# Patient Record
Sex: FEMALE
[De-identification: names, ages, dates, MRNs, and addresses within clinical notes are randomized per-mention and may not be internally consistent; named-entity substitution may affect disease eponyms.]

---

## 2017-07-19 ENCOUNTER — HOSPITAL ENCOUNTER (OUTPATIENT)
Dept: HOSPITAL 5 - SPVIMAG | Age: 82
Discharge: HOME | End: 2017-07-19
Attending: INTERNAL MEDICINE
Payer: MEDICARE

## 2017-07-19 DIAGNOSIS — J84.9: ICD-10-CM

## 2017-07-19 DIAGNOSIS — J44.9: ICD-10-CM

## 2017-07-19 DIAGNOSIS — R04.2: ICD-10-CM

## 2017-07-19 DIAGNOSIS — J18.9: ICD-10-CM

## 2017-07-19 DIAGNOSIS — I25.10: ICD-10-CM

## 2017-07-19 DIAGNOSIS — R91.1: ICD-10-CM

## 2017-07-19 DIAGNOSIS — K80.20: Primary | ICD-10-CM

## 2017-07-19 DIAGNOSIS — I70.1: ICD-10-CM

## 2017-07-19 DIAGNOSIS — R06.02: ICD-10-CM

## 2017-07-19 DIAGNOSIS — I11.0: ICD-10-CM

## 2017-07-19 DIAGNOSIS — I50.9: ICD-10-CM

## 2017-07-19 DIAGNOSIS — Z95.1: ICD-10-CM

## 2017-07-19 PROCEDURE — 71260 CT THORAX DX C+: CPT

## 2017-07-19 PROCEDURE — 82962 GLUCOSE BLOOD TEST: CPT

## 2017-07-20 NOTE — CAT SCAN REPORT
CT CHEST WITH CONTRAST: 07/19/17 10:46:00





CLINICAL: Hemoptysis and shortness of breath.



Comparison: 06/03/14



TECHNIQUE: Volumetric acquisition and 1.25 mm scan reconstructions 

after the uneventful intravenous injection of 100cc Omnipaque 300.  

Consent was obtained prior to the administration of contrast.



FINDINGS: Mild bilateral multilobar intralobular septal thickening is 

slightly less prominent than on the prior exam.  Subtle multilobar 

groundglass opacities as seen on the last exam.  A left upper lobe oval 

circumscribed groundglass opacity at measures 1.0 cm x 0.7 cm compared 

to 0.7 x 0.4 cm on the last exam.  A more dense 5 mm nodular opacity of 

the right upper lobe is stable.  A wedge-shaped medial left upper lobe 

scar extending to the pleura in the apex is stable.  Stable bronchial 

dilatation in the center of the scar.  A number of multilobar 

subcentimeter nodular opacities have resolved compared to the last 

exam.  Decreased bilateral pleural thickening.  No pleural effusion.  

Normal heart status post CABG.  Calcification of the aorta.  The 

pulmonary arteries are normal.  No hilar or mediastinal 

lymphadenopathy. No pleural effusion. Normal thyroid, trachea and 

esophagus. No axillary or supraclavicular lymphadenopathy. The upper 

abdomen is remarkable for a 5 mm gallstone which is new.  In addition, 

the right kidney is smaller than the left and this is a new finding 

compared to the last exam.  The right renal artery is heavily calcified 

in the abdominal aorta is heavily calcified.  Bone windows demonstrate 

osteopenia and degenerative changes in the spine.  No suspicious bone 

lesions.  Status post CABG with median sternotomy wires.  Patent left 

subclavian arterial graft extending to the pelvis.



IMPRESSION: 1.  Chronic interstitial disease with slight overall 

improvement compared to the last exam.  2.  A 1 cm left upper lobe 

sub-solid lung nodule.  Recommend six month followup with a noncontrast 

CT to evaluate for growth.  3.  Cholelithiasis.  4.  Right renal artery 

stenosis and decreased size of the right kidney since 2014.

## 2018-04-10 ENCOUNTER — HOSPITAL ENCOUNTER (EMERGENCY)
Dept: HOSPITAL 5 - ED | Age: 83
Discharge: HOME | End: 2018-04-10
Payer: MEDICARE

## 2018-04-10 VITALS — DIASTOLIC BLOOD PRESSURE: 76 MMHG | SYSTOLIC BLOOD PRESSURE: 180 MMHG

## 2018-04-10 DIAGNOSIS — Z88.8: ICD-10-CM

## 2018-04-10 DIAGNOSIS — E03.9: ICD-10-CM

## 2018-04-10 DIAGNOSIS — Z79.82: ICD-10-CM

## 2018-04-10 DIAGNOSIS — J44.9: ICD-10-CM

## 2018-04-10 DIAGNOSIS — F17.200: ICD-10-CM

## 2018-04-10 DIAGNOSIS — M54.9: ICD-10-CM

## 2018-04-10 DIAGNOSIS — Z88.6: ICD-10-CM

## 2018-04-10 DIAGNOSIS — I11.0: Primary | ICD-10-CM

## 2018-04-10 DIAGNOSIS — Z79.4: ICD-10-CM

## 2018-04-10 DIAGNOSIS — I50.9: ICD-10-CM

## 2018-04-10 DIAGNOSIS — E11.9: ICD-10-CM

## 2018-04-10 LAB
ALBUMIN SERPL-MCNC: 3.6 G/DL (ref 3.9–5)
ALT SERPL-CCNC: 10 UNITS/L (ref 7–56)
BASOPHILS # (AUTO): 0 K/MM3 (ref 0–0.1)
BASOPHILS NFR BLD AUTO: 0.6 % (ref 0–1.8)
BUN SERPL-MCNC: 22 MG/DL (ref 7–17)
BUN/CREAT SERPL: 18 %
CALCIUM SERPL-MCNC: 9.1 MG/DL (ref 8.4–10.2)
EOSINOPHIL # BLD AUTO: 0 K/MM3 (ref 0–0.4)
EOSINOPHIL NFR BLD AUTO: 0.2 % (ref 0–4.3)
HCT VFR BLD CALC: 31.7 % (ref 30.3–42.9)
HEMOLYSIS INDEX: 4
HGB BLD-MCNC: 10 GM/DL (ref 10.1–14.3)
LYMPHOCYTES # BLD AUTO: 2.4 K/MM3 (ref 1.2–5.4)
LYMPHOCYTES NFR BLD AUTO: 35.9 % (ref 13.4–35)
MCH RBC QN AUTO: 28 PG (ref 28–32)
MCHC RBC AUTO-ENTMCNC: 32 % (ref 30–34)
MCV RBC AUTO: 88 FL (ref 79–97)
MONOCYTES # (AUTO): 0.6 K/MM3 (ref 0–0.8)
MONOCYTES % (AUTO): 8.7 % (ref 0–7.3)
PLATELET # BLD: 161 K/MM3 (ref 140–440)
RBC # BLD AUTO: 3.59 M/MM3 (ref 3.65–5.03)

## 2018-04-10 PROCEDURE — 80053 COMPREHEN METABOLIC PANEL: CPT

## 2018-04-10 PROCEDURE — 99283 EMERGENCY DEPT VISIT LOW MDM: CPT

## 2018-04-10 PROCEDURE — 36415 COLL VENOUS BLD VENIPUNCTURE: CPT

## 2018-04-10 PROCEDURE — 85025 COMPLETE CBC W/AUTO DIFF WBC: CPT

## 2018-04-10 NOTE — EMERGENCY DEPARTMENT REPORT
ED General Adult HPI





- General


Chief complaint: High BP


Stated complaint: HYPERTENSIVE


Time Seen by Provider: 04/10/18 13:30


Source: patient


Mode of arrival: Ambulatory


Limitations: No Limitations





- History of Present Illness


Initial comments: 





Patient is a 22 years old female with past medical history of hypertension and 

spinal stenosis.  The patient was sent from orthopedics  office after she 

was found to have a high blood pressure.  Patient went orthopedics for steroids 

injection.  Patient stated that she was in pain management back pain.  Patient 

denied any weakness numbness or tingling sensation.  Patient denied any headache

, neck pain, bowel or bladder incontinence.  No fever.





- Related Data


 Home Medications











 Medication  Instructions  Recorded  Confirmed  Last Taken


 


Aspirin [Aspirin BABY CHEW TAB] 81 mg PO BID 14 09:00





    81


 


Calcium Carbonate/Vitamin D3 1 mg PO BID 14 09:00





[Calcium 600-Vit D3 400 Tablet]    1


 


Carvedilol [Coreg] 12.5 mg PO BID 14





    12.5


 


Cetirizine HCl 10 mg PO QDAY 14





    10


 


Ezetimibe [Zetia] 10 mg PO QDAY 14 09:00


 


Formoterol Fumarate [Foradil] 12 mg PO BID 14 09:00


 


Levothyroxine [Synthroid] 0.05 mg PO QAM 14 06:00


 


Multivitamin [Multi-Vitamin Daily] 1 mg PO QDAY 14 09:00





    1


 


Ramipril 10 mg PO QDAY 14





    40


 


Ranitidine HCl [Zantac 75 MG TAB] 150 mg PO BID 14 Unknown


 


Rosuvastatin Calcium [Crestor] 40 mg PO QHS 14





    40


 


Insulin Lispro [HumaLOG VIAL] 10 units SUB-Q TIDAC 14 Unknown








 Previous Rx's











 Medication  Instructions  Recorded  Last Taken  Type


 


Ipratropium/Albuterol Sulfate 1 ampul IH TIDRT 30 Days  ampul.neb 14 

Unknown Rx





[DUONEB *Not for PRN Use*]    


 


Levofloxacin [Levaquin TAB] 750 mg PO QDAY #5 tablet 14 Unknown Rx


 


Saccharomyces Boulardii (Nf) 250 mg PO Q12HR #20 capsule 14 Unknown Rx





[Florastor]    


 


guaiFENesin ER [Mucinex ER] 600 mg PO BID #14 tablet 14 Unknown Rx











 Allergies











Allergy/AdvReac Type Severity Reaction Status Date / Time


 


hydromorphone HCl Allergy Severe Rash Verified 14 05:48





[From Dilaudid]     


 


codeine AdvReac  Swelling Verified 14 13:32


 


hydroxyprogesterone caproate AdvReac  Seizure Verified 14 05:48





[From Delalutin]     














ED Review of Systems


ROS: 


Stated complaint: HYPERTENSIVE


Other details as noted in HPI





Comment: All other systems reviewed and negative


Constitutional: denies: chills, fever


Respiratory: denies: cough, orthopnea, shortness of breath, SOB with exertion


Cardiovascular: denies: chest pain, palpitations, dyspnea on exertion


Gastrointestinal: denies: abdominal pain, nausea, vomiting, diarrhea, 

constipation, hematemesis


Neurological: denies: headache, weakness, numbness, paresthesias





ED Past Medical Hx





- Past Medical History


Hx Hypertension: Yes


Hx Congestive Heart Failure: Yes


Hx Diabetes: Yes


Hx COPD: Yes


Additional medical history: hypothyroidism





- Surgical History


Hx Coronary Stent: Yes


Hx Appendectomy: Yes


Additional Surgical History: insulin pump, 





- Social History


Smoking Status: Current Every Day Smoker


Substance Use Type: None





- Medications


Home Medications: 


 Home Medications











 Medication  Instructions  Recorded  Confirmed  Last Taken  Type


 


Aspirin [Aspirin BABY CHEW TAB] 81 mg PO BID 14 09:00 

History





    81 


 


Calcium Carbonate/Vitamin D3 1 mg PO BID 14 09:00 

History





[Calcium 600-Vit D3 400 Tablet]    1 


 


Carvedilol [Coreg] 12.5 mg PO BID 14 History





    12.5 


 


Cetirizine HCl 10 mg PO QDAY 14 History





    10 


 


Ezetimibe [Zetia] 10 mg PO QDAY 14 09:00 History


 


Formoterol Fumarate [Foradil] 12 mg PO BID 14 09:00 

History


 


Levothyroxine [Synthroid] 0.05 mg PO QAM 14 06:00 

History


 


Multivitamin [Multi-Vitamin Daily] 1 mg PO QDAY 14 09:

00 History





    1 


 


Ramipril 10 mg PO QDAY 14 History





    40 


 


Ranitidine HCl [Zantac 75 MG TAB] 150 mg PO BID 14 Unknown 

History


 


Rosuvastatin Calcium [Crestor] 40 mg PO QHS 14 History





    40 


 


Insulin Lispro [HumaLOG VIAL] 10 units SUB-Q TIDAC 14 Unknown 

History


 


Ipratropium/Albuterol Sulfate 1 ampul IH TIDRT 30 Days  ampul.neb 14  

Unknown Rx





[DUONEB *Not for PRN Use*]     


 


Levofloxacin [Levaquin TAB] 750 mg PO QDAY #5 tablet 14  Unknown Rx


 


Saccharomyces Boulardii (Nf) 250 mg PO Q12HR #20 capsule 14  Unknown Rx





[Florastor]     


 


guaiFENesin ER [Mucinex ER] 600 mg PO BID #14 tablet 14  Unknown Rx














ED Physical Exam





- General


Limitations: No Limitations


General appearance: alert, in no apparent distress





- Head


Head exam: Present: atraumatic, normocephalic, normal inspection





- Eye


Eye exam: Present: normal appearance, PERRL





- ENT


ENT exam: Present: normal exam, normal orophraynx, mucous membranes moist





- Neck


Neck exam: Present: normal inspection, full ROM.  Absent: tenderness, 

meningismus, lymphadenopathy





- Respiratory


Respiratory exam: Present: normal lung sounds bilaterally.  Absent: respiratory 

distress, wheezes, rales, rhonchi, chest wall tenderness





- Cardiovascular


Cardiovascular Exam: Present: regular rate, normal rhythm, normal heart sounds





- GI/Abdominal


GI/Abdominal exam: Present: soft.  Absent: distended, tenderness, guarding, 

rebound, rigid, normal bowel sounds





- Extremities Exam


Extremities exam: Present: normal inspection, full ROM, normal capillary refill





- Back Exam


Back exam: Present: normal inspection, full ROM.  Absent: CVA tenderness (L)





- Neurological Exam


Neurological exam: Present: alert, oriented X3, CN II-XII intact, normal gait





- Skin


Skin exam: Present: warm, intact, normal color





ED Course


 Vital Signs











  04/10/18 04/10/18 04/10/18





  11:52 12:03 14:06


 


Temperature 97.9 F  


 


Pulse Rate 68 68 62


 


Respiratory 18  11 L





Rate   


 


Blood Pressure 230/78 230/78 


 


Blood Pressure   





[Right]   


 


O2 Sat by Pulse 98  





Oximetry   














  04/10/18 04/10/18 04/10/18





  14:12 14:15 14:30


 


Temperature   


 


Pulse Rate  57 L 55 L


 


Respiratory 10 L 15 12





Rate   


 


Blood Pressure  188/72 163/65


 


Blood Pressure   





[Right]   


 


O2 Sat by Pulse 98 95 93





Oximetry   














  04/10/18 04/10/18 04/10/18





  14:45 15:00 15:15


 


Temperature   


 


Pulse Rate 61 53 L 55 L


 


Respiratory 14 12 12





Rate   


 


Blood Pressure 186/64 161/59 161/59


 


Blood Pressure   





[Right]   


 


O2 Sat by Pulse 92 94 93





Oximetry   














  04/10/18 04/10/18 04/10/18





  15:31 15:44 16:16


 


Temperature   


 


Pulse Rate 51 L  51 L


 


Respiratory 12  16





Rate   


 


Blood Pressure 164/57  


 


Blood Pressure  164/57 179/64





[Right]   


 


O2 Sat by Pulse 93  97





Oximetry   














  04/10/18





  18:16


 


Temperature 


 


Pulse Rate 74


 


Respiratory 16





Rate 


 


Blood Pressure 


 


Blood Pressure 180/76





[Right] 


 


O2 Sat by Pulse 99





Oximetry 














- Reevaluation(s)


Reevaluation #1: 





04/10/18 18:18


Patient refused pain medication stated that she does not have any.  This 

moment.  Her blood pressure is now 164/49.  Patient had wide pulse pressure, 

patient is completely asymptomatic.  No evidence of aortic regurgitation 

clinically.  I advised patient to follow-up with her primary care physician for 

further management.





ED Medical Decision Making





- Lab Data


Result diagrams: 


 04/10/18 13:52





 04/10/18 13:52


Critical care attestation.: 


If time is entered above; I have spent that time in minutes in the direct care 

of this critically ill patient, excluding procedure time.








ED Disposition


Clinical Impression: 


 Malignant hypertension, Back pain





Disposition: DC-01 TO HOME OR SELFCARE


Is pt being admited?: No


Condition: Stable


Instructions:  Lumbar Spinal Stenosis (ED), Hypertension (ED)


Referrals: 


PRIMARY CARE,MD [Primary Care Provider] - 3-5 Days

## 2018-05-19 ENCOUNTER — HOSPITAL ENCOUNTER (EMERGENCY)
Dept: HOSPITAL 5 - ED | Age: 83
Discharge: HOME | End: 2018-05-19
Payer: MEDICARE

## 2018-05-19 VITALS — DIASTOLIC BLOOD PRESSURE: 57 MMHG | SYSTOLIC BLOOD PRESSURE: 159 MMHG

## 2018-05-19 DIAGNOSIS — I50.9: ICD-10-CM
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
## 2018-05-19 DIAGNOSIS — Z79.4: ICD-10-CM

## 2018-05-19 DIAGNOSIS — E11.9: ICD-10-CM

## 2018-05-19 DIAGNOSIS — R53.1: Primary | ICD-10-CM

## 2018-05-19 DIAGNOSIS — E03.9: ICD-10-CM

## 2018-05-19 DIAGNOSIS — I10: ICD-10-CM

## 2018-05-19 DIAGNOSIS — Z79.82: ICD-10-CM

## 2018-05-19 DIAGNOSIS — T44.1X5A: ICD-10-CM

## 2018-05-19 LAB
ALBUMIN SERPL-MCNC: 3.7 G/DL (ref 3.9–5)
ALT SERPL-CCNC: 12 UNITS/L (ref 7–56)
BASOPHILS # (AUTO): 0 K/MM3 (ref 0–0.1)
BASOPHILS NFR BLD AUTO: 0.5 % (ref 0–1.8)
BUN SERPL-MCNC: 34 MG/DL (ref 7–17)
BUN/CREAT SERPL: 23 %
CALCIUM SERPL-MCNC: 9.1 MG/DL (ref 8.4–10.2)
EOSINOPHIL # BLD AUTO: 0 K/MM3 (ref 0–0.4)
EOSINOPHIL NFR BLD AUTO: 0.3 % (ref 0–4.3)
HCT VFR BLD CALC: 35.3 % (ref 30.3–42.9)
HEMOLYSIS INDEX: 4
HGB BLD-MCNC: 11.5 GM/DL (ref 10.1–14.3)
LYMPHOCYTES # BLD AUTO: 3.3 K/MM3 (ref 1.2–5.4)
LYMPHOCYTES NFR BLD AUTO: 37.4 % (ref 13.4–35)
MCH RBC QN AUTO: 29 PG (ref 28–32)
MCHC RBC AUTO-ENTMCNC: 33 % (ref 30–34)
MCV RBC AUTO: 89 FL (ref 79–97)
MONOCYTES # (AUTO): 0.8 K/MM3 (ref 0–0.8)
MONOCYTES % (AUTO): 9.1 % (ref 0–7.3)
PLATELET # BLD: 186 K/MM3 (ref 140–440)
RBC # BLD AUTO: 3.99 M/MM3 (ref 3.65–5.03)

## 2018-05-19 PROCEDURE — 83735 ASSAY OF MAGNESIUM: CPT

## 2018-05-19 PROCEDURE — 87040 BLOOD CULTURE FOR BACTERIA: CPT

## 2018-05-19 PROCEDURE — 84443 ASSAY THYROID STIM HORMONE: CPT

## 2018-05-19 PROCEDURE — 80053 COMPREHEN METABOLIC PANEL: CPT

## 2018-05-19 PROCEDURE — 85025 COMPLETE CBC W/AUTO DIFF WBC: CPT

## 2018-05-19 PROCEDURE — 36415 COLL VENOUS BLD VENIPUNCTURE: CPT

## 2018-05-19 PROCEDURE — 82550 ASSAY OF CK (CPK): CPT

## 2018-05-19 PROCEDURE — 71045 X-RAY EXAM CHEST 1 VIEW: CPT

## 2018-05-19 PROCEDURE — 84100 ASSAY OF PHOSPHORUS: CPT

## 2018-05-19 PROCEDURE — 70450 CT HEAD/BRAIN W/O DYE: CPT

## 2018-05-19 PROCEDURE — 84484 ASSAY OF TROPONIN QUANT: CPT

## 2018-05-19 NOTE — CAT SCAN REPORT
FINAL REPORT



PROCEDURE:  CT HEAD/BRAIN WO CON



TECHNIQUE:  Computerized tomography of the head was performed

without contrast material. 



HISTORY:  Weakness 



COMPARISON:  No prior studies are available for comparison.



FINDINGS:  

Skull and scalp: Normal.



Paranasal sinuses: Normal.



Ventricles and subarachnoid spaces: There is central and cortical

atrophy. There is no hydrocephalus or asymmetry..



Cerebrum: No evidence of hemorrhage, acute infarction or mass.

There is chronic periventricular deep white matter ischemic

gliosis. 



Cerebellum and brainstem: No evidence of hemorrhage, acute

infarction or mass.



Vasculature: There are dense calcified plaque in the cavernous

portions of the internal carotid arteries..



Comments: The left globe is calcified..



There is ossification of the dura. 



IMPRESSION:  

There is no acute intracranial abnormality.

## 2018-05-19 NOTE — EMERGENCY DEPARTMENT REPORT
- General


Chief complaint: Shoulder Injury


Stated complaint: RT SHOULDER/ARM PAIN


Time Seen by Provider: 18 00:19


Source: patient, EMS


Mode of arrival: Stretcher


Limitations: Physical Limitation





- History of Present Illness


Initial comments: 





Ms. Colin is a very pleasant 84 yo female who presents with generalized 

weakness fatigue.  She has lack of energy.  Just prior to arrival via EMS, she 

had right arm shaking for one hour.  Uncontrollable shaking which she has not 

had previously.  Now she just drained.  She feels sick.  She is very concerned.

  She does not know was wrong.  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 

diabetes.  She is on insulin pump.  She has a history of coronary artery 

disease status post CABG.  She is taking medication for memory.  


MD Complaint: generalized weakness, lack of energy


-: Sudden, hour(s) (several)


Location: generalized


Severity: moderate





- Related Data


 Home Medications











 Medication  Instructions  Recorded  Confirmed  Last Taken


 


Aspirin [Aspirin BABY CHEW TAB] 81 mg PO BID 14 09:00





    81


 


Calcium Carbonate/Vitamin D3 1 mg PO BID 14 09:00





[Calcium 600-Vit D3 400 Tablet]    1


 


Carvedilol [Coreg] 12.5 mg PO BID 14





    12.5


 


Cetirizine HCl 10 mg PO QDAY 14





    10


 


Ezetimibe [Zetia] 10 mg PO QDAY 14 09:00


 


Formoterol Fumarate [Foradil] 12 mg PO BID 14 09:00


 


Levothyroxine [Synthroid] 0.05 mg PO QAM 14 06:00


 


Multivitamin [Multi-Vitamin Daily] 1 mg PO QDAY 14 09:00





    1


 


Ramipril 10 mg PO QDAY 14





    40


 


Ranitidine HCl [Zantac 75 MG TAB] 150 mg PO BID 14 Unknown


 


Rosuvastatin Calcium [Crestor] 40 mg PO QHS 14





    40


 


Insulin Lispro [HumaLOG VIAL] 10 units SUB-Q TIDAC 14 Unknown








 Previous Rx's











 Medication  Instructions  Recorded  Last Taken  Type


 


Ipratropium/Albuterol Sulfate 1 ampul IH TIDRT 30 Days  ampul.neb 14 

Unknown Rx





[DUONEB *Not for PRN Use*]    


 


Levofloxacin [Levaquin TAB] 750 mg PO QDAY #5 tablet 14 Unknown Rx


 


Saccharomyces Boulardii (Nf) 250 mg PO Q12HR #20 capsule 14 Unknown Rx





[Florastor]    


 


guaiFENesin ER [Mucinex ER] 600 mg PO BID #14 tablet 14 Unknown Rx


 


Ondansetron [Zofran Odt] 4 mg PO Q8HR PRN #14 tab.rapdis 04/10/18 Unknown Rx


 


oxyCODONE /ACETAMINOPHEN [Percocet 1 tab PO Q6HR PRN #10 tablet 04/10/18 

Unknown Rx





5/325]    











 Allergies











Allergy/AdvReac Type Severity Reaction Status Date / Time


 


hydromorphone HCl Allergy Severe Rash Verified 14 05:48





[From Dilaudid]     


 


codeine AdvReac  Swelling Verified 14 13:32


 


hydroxyprogesterone caproate AdvReac  Seizure Verified 14 05:48





[From Delalutin]     














ED Review of Systems


ROS: 


Stated complaint: RT SHOULDER/ARM PAIN


Other details as noted in HPI





Comment: All other systems reviewed and negative


Constitutional: malaise.  denies: fever


Respiratory: denies: cough


Cardiovascular: denies: chest pain


Gastrointestinal: denies: abdominal pain, nausea


Neurological: denies: headache, numbness, paresthesias





ED Past Medical Hx





- Past Medical History


Hx Hypertension: Yes


Hx Congestive Heart Failure: Yes


Hx Diabetes: Yes


Hx COPD: Yes


Additional medical history: hypothyroidism





- Surgical History


Hx Coronary Stent: Yes


Hx Appendectomy: Yes


Additional Surgical History: insulin pump, 





- Social History


Smoking Status: Never Smoker


Substance Use Type: None





- Medications


Home Medications: 


 Home Medications











 Medication  Instructions  Recorded  Confirmed  Last Taken  Type


 


Aspirin [Aspirin BABY CHEW TAB] 81 mg PO BID 14 09:00 

History





    81 


 


Calcium Carbonate/Vitamin D3 1 mg PO BID 14 09:00 

History





[Calcium 600-Vit D3 400 Tablet]    1 


 


Carvedilol [Coreg] 12.5 mg PO BID 14 History





    12.5 


 


Cetirizine HCl 10 mg PO QDAY 14 History





    10 


 


Ezetimibe [Zetia] 10 mg PO QDAY 14 09:00 History


 


Formoterol Fumarate [Foradil] 12 mg PO BID 14 09:00 

History


 


Levothyroxine [Synthroid] 0.05 mg PO QAM 14 06:00 

History


 


Multivitamin [Multi-Vitamin Daily] 1 mg PO QDAY 14 09:

00 History





    1 


 


Ramipril 10 mg PO QDAY 14 History





    40 


 


Ranitidine HCl [Zantac 75 MG TAB] 150 mg PO BID 14 Unknown 

History


 


Rosuvastatin Calcium [Crestor] 40 mg PO QHS 14 History





    40 


 


Insulin Lispro [HumaLOG VIAL] 10 units SUB-Q TIDAC 14 Unknown 

History


 


Ipratropium/Albuterol Sulfate 1 ampul IH TIDRT 30 Days  ampul.neb 14  

Unknown Rx





[DUONEB *Not for PRN Use*]     


 


Levofloxacin [Levaquin TAB] 750 mg PO QDAY #5 tablet 14  Unknown Rx


 


Saccharomyces Boulardii (Nf) 250 mg PO Q12HR #20 capsule 14  Unknown Rx





[Florastor]     


 


guaiFENesin ER [Mucinex ER] 600 mg PO BID #14 tablet 14  Unknown Rx


 


Ondansetron [Zofran Odt] 4 mg PO Q8HR PRN #14 tab.rapdis 04/10/18  Unknown Rx


 


oxyCODONE /ACETAMINOPHEN [Percocet 1 tab PO Q6HR PRN #10 tablet 04/10/18  

Unknown Rx





5/325]     














ED Physical Exam





- General


Limitations: Physical Limitation


General appearance: alert, in no apparent distress





- Head


Head exam: Present: atraumatic, normocephalic





- Eye


Eye exam: Present: normal appearance





- ENT


ENT exam: Present: normal orophraynx, mucous membranes moist





- Neck


Neck exam: Present: normal inspection





- Respiratory


Respiratory exam: Present: normal lung sounds bilaterally.  Absent: respiratory 

distress, wheezes, rales, rhonchi





- Cardiovascular


Cardiovascular Exam: Present: regular rate, normal rhythm, normal heart sounds.

  Absent: systolic murmur, diastolic murmur, rubs, gallop





- GI/Abdominal


GI/Abdominal exam: Present: soft, normal bowel sounds.  Absent: distended, 

tenderness, guarding, rebound





- Extremities Exam


Extremities exam: Present: normal inspection





- Back Exam


Back exam: Present: normal inspection





- Neurological Exam


Neurological exam: Present: alert, oriented X3, CN II-XII intact, normal gait.  

Absent: motor sensory deficit, reflexes normal





- Psychiatric


Psychiatric exam: Present: normal affect, normal mood





- Skin


Skin exam: Present: warm, dry, intact, normal color.  Absent: rash





- Assessment


Assessment Interval: Baseline





- Level of Consciousness


1a. Level of Consciousness: alert





- LOC Questions


1b. LOC Questions: answers correctly





- LOC Command


1c. LOC Commands: performs tasks correctly





- Best Gaze


2. Best Gaze: normal





- Visual


3. Visual: no visual loss





- Facial Palsy


4. Facial Palsy: normal symmetrical movement





- Motor Arm


5b. Motor Arm Right: no drift


5a. Motor Arm Left: no drift





- Motor Leg


6a. Motor Leg Left: no drift


6b. Motor Leg Right: no drift





- Limb Ataxia


7. Limb Ataxia: absent





- Sensory


8. Sensory: normal





- Best Language


9. Best Language: no aphasia





- Dysarthria


10. Dysarthria: normal





- Extinction and Inattention


11. Extinction/Inattention: no abnormality





- Scoring


Total Score: 0


Stroke Severity: No Stroke Symptoms





ED Course


 Vital Signs











  18





  00:12 00:27 00:30


 


Temperature   


 


Pulse Rate 80  75


 


Respiratory 20  10 L





Rate   


 


Blood Pressure 193/69  


 


Blood Pressure   





[Left]   


 


O2 Sat by Pulse 99 100 100





Oximetry   














  18/18





  00:35 02:31 02:32


 


Temperature 97.2 F L  97.4 F L


 


Pulse Rate  67 67


 


Respiratory   14





Rate   


 


Blood Pressure  159/57 


 


Blood Pressure   159/57





[Left]   


 


O2 Sat by Pulse   96





Oximetry   














ED Medical Decision Making





- Lab Data


Result diagrams: 


 18 00:36





 18 00:36








 Laboratory Results - last 24 hr











  18





  00:36 00:36 00:36


 


WBC   8.7 


 


RBC   3.99 


 


Hgb   11.5 


 


Hct   35.3 


 


MCV   89 


 


MCH   29 


 


MCHC   33 


 


RDW   17.1 H 


 


Plt Count   186 


 


Lymph % (Auto)   37.4 H 


 


Mono % (Auto)   9.1 H 


 


Eos % (Auto)   0.3 


 


Baso % (Auto)   0.5 


 


Lymph #   3.3 


 


Mono #   0.8 


 


Eos #   0.0 


 


Baso #   0.0 


 


Seg Neutrophils %   52.7 


 


Seg Neutrophils #   4.6 


 


Sodium  138  


 


Potassium  4.0  


 


Chloride  100.6  


 


Carbon Dioxide  22  


 


Anion Gap  19  


 


BUN  34 H  


 


Creatinine  1.5 H  


 


Estimated GFR  33  


 


BUN/Creatinine Ratio  23  


 


Glucose  167 H  


 


Calcium  9.1  


 


Phosphorus    3.00


 


Magnesium    2.20


 


Total Bilirubin  0.30  


 


AST  20  


 


ALT  12  


 


Alkaline Phosphatase  51  


 


Total Creatine Kinase    119


 


Troponin T    < 0.010


 


Total Protein  6.6  


 


Albumin  3.7 L  


 


Albumin/Globulin Ratio  1.3  


 


TSH   














  18





  00:36


 


WBC 


 


RBC 


 


Hgb 


 


Hct 


 


MCV 


 


MCH 


 


MCHC 


 


RDW 


 


Plt Count 


 


Lymph % (Auto) 


 


Mono % (Auto) 


 


Eos % (Auto) 


 


Baso % (Auto) 


 


Lymph # 


 


Mono # 


 


Eos # 


 


Baso # 


 


Seg Neutrophils % 


 


Seg Neutrophils # 


 


Sodium 


 


Potassium 


 


Chloride 


 


Carbon Dioxide 


 


Anion Gap 


 


BUN 


 


Creatinine 


 


Estimated GFR 


 


BUN/Creatinine Ratio 


 


Glucose 


 


Calcium 


 


Phosphorus 


 


Magnesium 


 


Total Bilirubin 


 


AST 


 


ALT 


 


Alkaline Phosphatase 


 


Total Creatine Kinase 


 


Troponin T 


 


Total Protein 


 


Albumin 


 


Albumin/Globulin Ratio 


 


TSH  4.310 H











 Laboratory Results - last 24 hr











  18





  00:36 00:36 00:36


 


WBC   8.7 


 


RBC   3.99 


 


Hgb   11.5 


 


Hct   35.3 


 


MCV   89 


 


MCH   29 


 


MCHC   33 


 


RDW   17.1 H 


 


Plt Count   186 


 


Lymph % (Auto)   37.4 H 


 


Mono % (Auto)   9.1 H 


 


Eos % (Auto)   0.3 


 


Baso % (Auto)   0.5 


 


Lymph #   3.3 


 


Mono #   0.8 


 


Eos #   0.0 


 


Baso #   0.0 


 


Seg Neutrophils %   52.7 


 


Seg Neutrophils #   4.6 


 


Sodium  138  


 


Potassium  4.0  


 


Chloride  100.6  


 


Carbon Dioxide  22  


 


Anion Gap  19  


 


BUN  34 H  


 


Creatinine  1.5 H  


 


Estimated GFR  33  


 


BUN/Creatinine Ratio  23  


 


Glucose  167 H  


 


Calcium  9.1  


 


Phosphorus    3.00


 


Magnesium    2.20


 


Total Bilirubin  0.30  


 


AST  20  


 


ALT  12  


 


Alkaline Phosphatase  51  


 


Total Creatine Kinase    119


 


Troponin T    < 0.010


 


Total Protein  6.6  


 


Albumin  3.7 L  


 


Albumin/Globulin Ratio  1.3  


 


TSH   














  18





  00:36


 


WBC 


 


RBC 


 


Hgb 


 


Hct 


 


MCV 


 


MCH 


 


MCHC 


 


RDW 


 


Plt Count 


 


Lymph % (Auto) 


 


Mono % (Auto) 


 


Eos % (Auto) 


 


Baso % (Auto) 


 


Lymph # 


 


Mono # 


 


Eos # 


 


Baso # 


 


Seg Neutrophils % 


 


Seg Neutrophils # 


 


Sodium 


 


Potassium 


 


Chloride 


 


Carbon Dioxide 


 


Anion Gap 


 


BUN 


 


Creatinine 


 


Estimated GFR 


 


BUN/Creatinine Ratio 


 


Glucose 


 


Calcium 


 


Phosphorus 


 


Magnesium 


 


Total Bilirubin 


 


AST 


 


ALT 


 


Alkaline Phosphatase 


 


Total Creatine Kinase 


 


Troponin T 


 


Total Protein 


 


Albumin 


 


Albumin/Globulin Ratio 


 


TSH  4.310 H











 Vital Signs - 24 hr











  18





  00:12 00:27 00:30


 


Temperature   


 


Pulse Rate 80  75


 


Respiratory 20  10 L





Rate   


 


Blood Pressure 193/69  


 


Blood Pressure   





[Left]   


 


O2 Sat by Pulse 99 100 100





Oximetry   














  18





  00:35 02:31 02:32


 


Temperature 97.2 F L  97.4 F L


 


Pulse Rate  67 67


 


Respiratory   14





Rate   


 


Blood Pressure  159/57 


 


Blood Pressure   159/57





[Left]   


 


O2 Sat by Pulse   96





Oximetry   














- Medical Decision Making





Mrs. Colin presents with right arm shaking and general malaise.  Her  

came to bedside after patient arrived via EMS.  He informed me that his wife 

has vascular dementia.  New increase of Exelon dose from 1.5 mg to 3 mg today.  

She also took Prolia injection for osteoporosis today.  I suspect patient's 

symptoms are due to adverse effect of Exelon.  She had elevated blood pressure 

initially on arrival.  Blood pressure dropped dramatically with no intervention.





She desires to be discharged home.   agrees with plan for discharge.  

 and patient both understand return precautions.





Critical care attestation.: 


If time is entered above; I have spent that time in minutes in the direct care 

of this critically ill patient, excluding procedure time.








ED Disposition


Clinical Impression: 


 Medication adverse effect, Generalized weakness





Disposition: DC-01 TO HOME OR SELFCARE


Is pt being admited?: No


Does the pt Need Aspirin: No


Condition: Stable


Instructions:  Weakness (ED)


Time of Disposition: 02:39

## 2018-05-19 NOTE — XRAY REPORT
FINAL REPORT



PROCEDURE:  XR CHEST 1V AP



TECHNIQUE:  Chest radiograph anteroposterior view. CPT 76864







HISTORY:  Weakness 



COMPARISON:  No prior studies are available for comparison.



FINDINGS:  

Heart: Normal. There has been open heart surgery. 



Mediastinum/Vessels: There is calcified plaque in the thoracic

aorta. There is no aneurysm..



Lungs/Pleural space: Lungs are expanded. There are mild fibrotic

changes. There is a 12 millimeter noncalcified nodule at the left

lung base. Neoplasm cannot be excluded. There is a small right

pleural effusion. There is no pneumothorax..



Bony thorax: No acute osseous abnormality.



Life support devices: None.



IMPRESSION:  

The heart size is normal..



There are mild fibrotic lungs changes. There is a 12 millimeter

noncalcified nodule at the left lung base. Neoplasm cannot be

excluded. There is a small right pleural effusion. There is no

pneumothorax..

## 2018-08-15 ENCOUNTER — HOSPITAL ENCOUNTER (INPATIENT)
Dept: HOSPITAL 5 - ED | Age: 83
LOS: 8 days | DRG: 870 | End: 2018-08-23
Attending: INTERNAL MEDICINE | Admitting: INTERNAL MEDICINE
Payer: MEDICARE

## 2018-08-15 DIAGNOSIS — E11.51: ICD-10-CM

## 2018-08-15 DIAGNOSIS — N17.0: ICD-10-CM

## 2018-08-15 DIAGNOSIS — Z88.6: ICD-10-CM

## 2018-08-15 DIAGNOSIS — K52.9: ICD-10-CM

## 2018-08-15 DIAGNOSIS — J98.11: ICD-10-CM

## 2018-08-15 DIAGNOSIS — G89.29: ICD-10-CM

## 2018-08-15 DIAGNOSIS — E11.22: ICD-10-CM

## 2018-08-15 DIAGNOSIS — E03.9: ICD-10-CM

## 2018-08-15 DIAGNOSIS — E43: ICD-10-CM

## 2018-08-15 DIAGNOSIS — E87.5: ICD-10-CM

## 2018-08-15 DIAGNOSIS — J44.9: ICD-10-CM

## 2018-08-15 DIAGNOSIS — I48.0: ICD-10-CM

## 2018-08-15 DIAGNOSIS — H54.40: ICD-10-CM

## 2018-08-15 DIAGNOSIS — E87.2: ICD-10-CM

## 2018-08-15 DIAGNOSIS — I25.10: ICD-10-CM

## 2018-08-15 DIAGNOSIS — Z83.3: ICD-10-CM

## 2018-08-15 DIAGNOSIS — Z99.81: ICD-10-CM

## 2018-08-15 DIAGNOSIS — M19.90: ICD-10-CM

## 2018-08-15 DIAGNOSIS — A41.9: Primary | ICD-10-CM

## 2018-08-15 DIAGNOSIS — J96.20: ICD-10-CM

## 2018-08-15 DIAGNOSIS — I50.9: ICD-10-CM

## 2018-08-15 DIAGNOSIS — N18.3: ICD-10-CM

## 2018-08-15 DIAGNOSIS — R62.7: ICD-10-CM

## 2018-08-15 DIAGNOSIS — R65.21: ICD-10-CM

## 2018-08-15 DIAGNOSIS — F03.90: ICD-10-CM

## 2018-08-15 DIAGNOSIS — Z78.1: ICD-10-CM

## 2018-08-15 DIAGNOSIS — Z82.49: ICD-10-CM

## 2018-08-15 DIAGNOSIS — D64.9: ICD-10-CM

## 2018-08-15 DIAGNOSIS — I46.9: ICD-10-CM

## 2018-08-15 DIAGNOSIS — Z66: ICD-10-CM

## 2018-08-15 DIAGNOSIS — G93.1: ICD-10-CM

## 2018-08-15 DIAGNOSIS — E11.65: ICD-10-CM

## 2018-08-15 DIAGNOSIS — Z90.49: ICD-10-CM

## 2018-08-15 DIAGNOSIS — Z79.899: ICD-10-CM

## 2018-08-15 DIAGNOSIS — Z95.1: ICD-10-CM

## 2018-08-15 DIAGNOSIS — I13.0: ICD-10-CM

## 2018-08-15 DIAGNOSIS — Z79.82: ICD-10-CM

## 2018-08-15 DIAGNOSIS — E87.1: ICD-10-CM

## 2018-08-15 LAB
ALBUMIN SERPL-MCNC: 2.8 G/DL (ref 3.9–5)
ALT SERPL-CCNC: 11 UNITS/L (ref 7–56)
BAND NEUTROPHILS # (MANUAL): 0 K/MM3
BUN SERPL-MCNC: 54 MG/DL (ref 7–17)
BUN/CREAT SERPL: 11 %
CALCIUM SERPL-MCNC: 8.4 MG/DL (ref 8.4–10.2)
HCT VFR BLD CALC: 31.1 % (ref 30.3–42.9)
HDLC SERPL-MCNC: 32 MG/DL (ref 40–59)
HEMOLYSIS INDEX: 17
HGB BLD-MCNC: 10.4 GM/DL (ref 10.1–14.3)
MCH RBC QN AUTO: 30 PG (ref 28–32)
MCHC RBC AUTO-ENTMCNC: 33 % (ref 30–34)
MCV RBC AUTO: 88 FL (ref 79–97)
MYELOCYTES # (MANUAL): 0 K/MM3
PLATELET # BLD: 197 K/MM3 (ref 140–440)
PROMYELOCYTES # (MANUAL): 0 K/MM3
RBC # BLD AUTO: 3.52 M/MM3 (ref 3.65–5.03)
TOTAL CELLS COUNTED BLD: 100

## 2018-08-15 PROCEDURE — 84484 ASSAY OF TROPONIN QUANT: CPT

## 2018-08-15 PROCEDURE — 71045 X-RAY EXAM CHEST 1 VIEW: CPT

## 2018-08-15 PROCEDURE — 94002 VENT MGMT INPAT INIT DAY: CPT

## 2018-08-15 PROCEDURE — 70450 CT HEAD/BRAIN W/O DYE: CPT

## 2018-08-15 PROCEDURE — 85025 COMPLETE CBC W/AUTO DIFF WBC: CPT

## 2018-08-15 PROCEDURE — 85027 COMPLETE CBC AUTOMATED: CPT

## 2018-08-15 PROCEDURE — 94640 AIRWAY INHALATION TREATMENT: CPT

## 2018-08-15 PROCEDURE — 74176 CT ABD & PELVIS W/O CONTRAST: CPT

## 2018-08-15 PROCEDURE — 93010 ELECTROCARDIOGRAM REPORT: CPT

## 2018-08-15 PROCEDURE — 87205 SMEAR GRAM STAIN: CPT

## 2018-08-15 PROCEDURE — 87086 URINE CULTURE/COLONY COUNT: CPT

## 2018-08-15 PROCEDURE — 87070 CULTURE OTHR SPECIMN AEROBIC: CPT

## 2018-08-15 PROCEDURE — 82550 ASSAY OF CK (CPK): CPT

## 2018-08-15 PROCEDURE — 82803 BLOOD GASES ANY COMBINATION: CPT

## 2018-08-15 PROCEDURE — 82962 GLUCOSE BLOOD TEST: CPT

## 2018-08-15 PROCEDURE — 82947 ASSAY GLUCOSE BLOOD QUANT: CPT

## 2018-08-15 PROCEDURE — 85730 THROMBOPLASTIN TIME PARTIAL: CPT

## 2018-08-15 PROCEDURE — 74018 RADEX ABDOMEN 1 VIEW: CPT

## 2018-08-15 PROCEDURE — 82570 ASSAY OF URINE CREATININE: CPT

## 2018-08-15 PROCEDURE — 94003 VENT MGMT INPAT SUBQ DAY: CPT

## 2018-08-15 PROCEDURE — 84300 ASSAY OF URINE SODIUM: CPT

## 2018-08-15 PROCEDURE — 36600 WITHDRAWAL OF ARTERIAL BLOOD: CPT

## 2018-08-15 PROCEDURE — 93005 ELECTROCARDIOGRAM TRACING: CPT

## 2018-08-15 PROCEDURE — 80048 BASIC METABOLIC PNL TOTAL CA: CPT

## 2018-08-15 PROCEDURE — 82140 ASSAY OF AMMONIA: CPT

## 2018-08-15 PROCEDURE — 85007 BL SMEAR W/DIFF WBC COUNT: CPT

## 2018-08-15 PROCEDURE — 93306 TTE W/DOPPLER COMPLETE: CPT

## 2018-08-15 PROCEDURE — 87040 BLOOD CULTURE FOR BACTERIA: CPT

## 2018-08-15 PROCEDURE — 94760 N-INVAS EAR/PLS OXIMETRY 1: CPT

## 2018-08-15 PROCEDURE — 80061 LIPID PANEL: CPT

## 2018-08-15 PROCEDURE — 84132 ASSAY OF SERUM POTASSIUM: CPT

## 2018-08-15 PROCEDURE — 83735 ASSAY OF MAGNESIUM: CPT

## 2018-08-15 PROCEDURE — 36415 COLL VENOUS BLD VENIPUNCTURE: CPT

## 2018-08-15 PROCEDURE — 81001 URINALYSIS AUTO W/SCOPE: CPT

## 2018-08-15 PROCEDURE — 84443 ASSAY THYROID STIM HORMONE: CPT

## 2018-08-15 PROCEDURE — 87116 MYCOBACTERIA CULTURE: CPT

## 2018-08-15 PROCEDURE — 80053 COMPREHEN METABOLIC PANEL: CPT

## 2018-08-15 RX ADMIN — OXYCODONE AND ACETAMINOPHEN PRN TAB: 5; 325 TABLET ORAL at 21:55

## 2018-08-15 RX ADMIN — IPRATROPIUM BROMIDE AND ALBUTEROL SULFATE SCH: .5; 3 SOLUTION RESPIRATORY (INHALATION) at 21:09

## 2018-08-15 NOTE — CAT SCAN REPORT
FINAL REPORT



EXAM:  CT ABDOMEN PELVIS WO CON



HISTORY:  abd pain 



TECHNIQUE:  Standard unenhanced CT of the abdomen and pelvis.

Coronal and sagittal reconstruction was also performed.



PRIORS:  CT chest 07/31/2018



FINDINGS:  

Moderate stool is present throughout the entire colon. There is

concentric wall thickening of the proximal sigmoid colon with

mild surrounding inflammation in the adjacent fat (Axial image

85). Findings are likely consistent with C difficile colitis. No

diverticuli are seen in the region. 



Within the abdomen, the liver, spleen, pancreas, adrenal glands,

and kidneys are unremarkable. Extensive vascular calcifications

in the kidneys are noted bilaterally. Cholelithiasis is present.

No evidence for retroperitoneal or pelvic lymphadenopathy is

seen.  The bowel loops have normal caliber. No soft tissue mass,

fluid collection, or free air is seen within the abdomen or

pelvis. The appendix is not visualized. Severe calcification of

the aorta is seen. 



Within the pelvis, the bladder is unremarkable. The uterus

contains multiple focal calcifications likely vascular or small

fibroids. No evidence for mass or lymphadenopathy is seen in the

pelvis. A left axillary bi-femoral bypass graft is in place. 



Images through the upper abdomen include the lung bases which

demonstrate small bilateral effusions, greater on the left than

compared to previous.



Bony structures show postsurgical changes in the posterior

spinous processes at L4 and L5. There is severe disc space

narrowing at L2 through L4. Mild S-shaped scoliosis of the lumbar

spine is seen. 



IMPRESSION:  

1. Concentric wall thickening involving the proximal sigmoid

colon. Findings are most likely consistent with C difficile

colitis



2. Bilateral pleural effusions, greater on the left when compared

to previous 



3. Cholelithiasis

## 2018-08-15 NOTE — XRAY REPORT
PORTABLE CHEST



INDICATION: Abdominal pain, dyspnea.



COMPARISON: 7/31/2018 CXR and CT.



FINDINGS: Portable, frontal chest radiograph demonstrate stable 

cardiomediastinal silhouette, atherosclerotic calcifications, post CABG 

changes, EKG leads and osseous structures.  Surgical clips also again 

noted projecting over the left upper lobe.  Slight interstitial 

prominence predominantly centrally as also subtle right lateral 

costophrenic angle blunting again noted.



CONCLUSION: Small right pleural effusion again suspected in this 

patient with various other stable findings, including known chronic 

interstitial changes, including few small interstitial nodules as also 

approximately 3 x 3.7 cm right apical/upper lobe mass/consolidation, 

better seen on CT.



Thank you for the opportunity to participate in this patient's care.

## 2018-08-15 NOTE — HISTORY AND PHYSICAL REPORT
History of Present Illness


Date of admission: 


08/15/18 16:29





Chief complaint: 





she is confused


History of present illness: 


82 YO Female with HTN, CAD S/P CABG, DM, COPD, chronic Respiratory Failure on 

home oxygen presents to ED for evaluation. Pt is confused and unable to provide 

detailed history. Pt history provided by her  who is at bedside during 

exam and interview. As per , the patient was found lying in bed and 

unresponsive. EMS notified, and upon arrival the patient was found unresponsive 

and hypotensive and in Atrial Fib with RVR. Pt transported to [facility] for further 

care and evaluation. Pt seen and evaluated in ED and found to have Atrial Fib 

with RVR, Sepsis, Acute Renal Failure, as well as Acidosis. No reports of fever

, chills, CP, palpitations, NVD, Trauma, shortness of breath, skin rash, or 

recent ill contacts. Pt admitted to telemetry, Cardiology consulted in ED. Pt 

initiated on sepsis protocol. Pt Atrial Fib resolved with supportive care. Pt 

in NSR at time of reevaluation. 








Past History


Past Medical History: CAD, COPD, diabetes, hypertension


Past Surgical History: appendectomy, CABG, , Other (Insulin pump, 

aortofemoral bypass.)


Social history: , lives with family.  denies: smoking, alcohol abuse, 

prescription drug abuse


Family history: diabetes, hypertension





Medications and Allergies


 Allergies











Allergy/AdvReac Type Severity Reaction Status Date / Time


 


hydromorphone HCl Allergy Severe Rash Verified 14 05:48





[From Dilaudid]     


 


codeine AdvReac  Swelling Verified 14 13:32


 


hydroxyprogesterone caproate AdvReac  Seizure Verified 14 05:48





[From Delalutin]     











 Home Medications











 Medication  Instructions  Recorded  Confirmed  Last Taken  Type


 


Aspirin [Aspirin BABY CHEW TAB] 81 mg PO BID 05/28/14 08/15/18 08/14/18 History


 


Calcium Carbonate/Vitamin D3 1 mg PO BID 05/28/14 08/15/18 08/14/18 History





[Calcium 600-Vit D3 400 Tablet]     


 


Cetirizine HCl 10 mg PO QDAY 05/28/14 08/15/18 08/14/18 History


 


Multivitamin [Multi-Vitamin Daily] 1 tab PO QDAY 05/28/14 08/15/18 08/14/18 

History


 


Ramipril 10 mg PO BID 05/28/14 08/15/18 08/14/18 History


 


Rosuvastatin Calcium [Crestor] 20 mg PO QHS 05/28/14 08/15/18 08/14/18 History


 


Saccharomyces Boulardii (Nf) 250 mg PO Q12HR #20 capsule 06/05/14 08/15/18 08/14

/18 Rx





[Florastor (Nf)]     


 


Ondansetron [Zofran ODT TAB] 4 mg PO Q8HR PRN #14 tab.rapdis 04/10/18 08/15/18 

08/14/18 Rx


 


Co Q-10 100 mg Softgel 100 mg PO DAILY 08/01/18 08/15/18 08/14/18 History


 


Exelon 1.5 mg PO BID 08/01/18 08/15/18 08/14/18 History


 


Furosemide 20 mg PO 2XW 08/01/18 08/15/18 08/14/18 History


 


Imdur ER 60 mg PO DAILY 08/01/18 08/15/18 08/14/18 History


 


Memantine HCl 5 mg PO BID 08/01/18 08/15/18 08/14/18 History


 


Ranitidine HCl 150 mg PO BID 08/01/18 08/15/18 08/14/18 History


 


Spiriva Respimat 2 puff INHALATION DAILY 08/01/18 08/15/18 08/14/18 History


 


Toprol Xl 25 mg PO DAILY 08/01/18 08/15/18 08/14/18 History


 


Xiidra 1 drop OD BID 08/01/18 08/15/18 08/14/18 History


 


traMADol 50 mg PO Q6HR PRN 08/01/18 08/15/18 08/14/18 History


 


Levothyroxine [Synthroid] 50 mcg PO QAM 08/15/18 08/15/18 08/14/18 History











Active Meds: 


Active Medications





Acetaminophen (Tylenol)  650 mg PO Q4H PRN


   PRN Reason: Pain MILD(1-3)/Fever >100.5/HA


Albuterol (Proventil)  2.5 mg IH Q4HRT PRN


   PRN Reason: Shortness Of Breath


Albuterol/Ipratropium (Duoneb *Not For Prn Use*)  1 ampul IH TIDRT [facility]


Aspirin (Baby Aspirin)  81 mg PO BID [facility]


Atorvastatin Calcium (Lipitor)  40 mg PO QHS [facility]


Calcium/Vitamin D (Oysco D 500 Mg-200 Unit)  1 each PO BID [facility]


Carvedilol (Coreg)  12.5 mg PO BID [facility]


Ezetimibe (Zetia)  10 mg PO QDAY [facility]


Famotidine (Pepcid)  20 mg PO BID [facility]


Guaifenesin (Mucinex Er)  600 mg PO BID [facility]


Metronidazole (Flagyl 500 Mg/100 Ml)  500 mg in 100 mls @ 200 mls/hr IV ONCE [facility]

; Protocol


   Last Admin: 08/15/18 17:16 Dose:  200 mls/hr


Vancomycin HCl (Vancomycin/Ns 1 Gm/250 Ml)  1 gm in 250 mls @ 166.667 mls/hr IV 

ONCE ONE


   Stop: 08/15/18 19:29


Piperacillin Sod/Tazobactam Sod (Zosyn/Ns 2.25 Gm/50ml)  2.25 gm in 50 mls @ 

100 mls/hr IV Q8HR [facility]


Insulin Human Lispro (Humalog)  10 unit SUB-Q TIDAC [facility]


Isosorbide Mononitrate (Imdur)  60 mg PO QDAY [facility]


Levothyroxine Sodium (Synthroid)  50 mcg PO QAM [facility]


Lisinopril (Zestril)  20 mg PO QDAY [facility]


Loratadine (Claritin)  10 mg PO DAILY [facility]


Memantine (Namenda)  5 mg PO BID [facility]


Metoprolol Succinate (Toprol Xl)  25 mg PO QDAY [facility]


Miscellaneous Medication (Co Q-10 100 Mg Softgel)  100 mg PO DAILY [facility]


Miscellaneous Medication (Formoterol Fumarate [Foradil])  12 mg PO BID [facility]


Miscellaneous Medication (Rosuvastatin Calcium [Crestor])  40 mg PO QHS [facility]


Miscellaneous Medication (Saccharomyces Boulardii (Nf))  250 mg PO Q12HR [facility]


Miscellaneous Medication (Spiriva Respimat)  2 puff INHALATION DAILY [facility]


Miscellaneous Medication (Xiidra)  1 drop OD BID [facility]


Multivitamins (Theragran Tab)  1 each PO DAILY [facility]


Ondansetron HCl (Zofran)  4 mg IV Q8H PRN


   PRN Reason: Nausea And Vomiting


Ondansetron HCl (Zofran Odt)  4 mg PO Q8HR PRN


   PRN Reason: Nausea And Vomiting


Ondansetron HCl (Zofran)  4 mg PO BID PRN


   PRN Reason: Nausea And Vomiting


Oxycodone/Acetaminophen (Percocet 5/325)  1 tab PO Q6HR PRN


   PRN Reason: Pain


Oxycodone/Acetaminophen (Percocet 5/325)  1 tab PO Q4H PRN


   PRN Reason: Pain, Moderate (4-6)


Rivastigmine Tartrate (Exelon)  1.5 mg PO BID [facility]


Sodium Chloride (Sodium Chloride Flush Syringe 10 Ml)  10 ml IV BID [facility]


Sodium Chloride (Sodium Chloride Flush Syringe 10 Ml)  10 ml IV PRN PRN


   PRN Reason: LINE FLUSH


Tramadol HCl (Ultram)  50 mg PO Q6H PRN


   PRN Reason: Pain, Moderate (4-6)











Review of Systems


ROS unobtainable: due to mental status





Exam





- Constitutional


Vitals: 


 











Temp Pulse Resp BP Pulse Ox


 


 98.0 F   118 H     167/61   93 


 


 08/15/18 13:49  08/15/18 13:49     08/15/18 13:49  08/15/18 13:49











General appearance: Present: mild distress, cachectic





- EENT


Eyes: Present: PERRL


ENT: hearing intact, clear oral mucosa





- Neck


Neck: Present: supple, normal ROM





- Respiratory


Respiratory effort: normal


Respiratory: bilateral: diminished





- Cardiovascular


Rhythm: irregularly irregular





- Extremities


Extremities: pulses symmetrical, No edema


Peripheral Pulses: abnormal (capillary refill greater than 3.6 seconds.)





- Abdominal


General gastrointestinal: Present: soft, non-tender, non-distended, normal 

bowel sounds


Female genitourinary: Present: normal





- Integumentary


Integumentary: Present: clear, dry, clammy, decreased turgor





- Musculoskeletal


Musculoskeletal: generalized weakness





- Psychiatric


Psychiatric: no intact judgment & insight, no memory intact





- Neurologic


Neurologic: no focal deficits, moves all extremities, no gait normal





Results





- Labs


CBC & Chem 7: 


 08/15/18 15:05





 08/15/18 15:05


Labs: 


 Abnormal lab results











  08/15/18 08/15/18 08/15/18 Range/Units





  14:01 15:05 15:05 


 


WBC   18.7 H   (4.5-11.0)  K/mm3


 


RBC   3.52 L   (3.65-5.03)  M/mm3


 


RDW   15.6 H   (13.2-15.2)  %


 


Seg Neuts % (Manual)   87.0 H   (40.0-70.0)  %


 


Lymphocytes % (Manual)   9.0 L   (13.4-35.0)  %


 


Seg Neutrophils # Man   16.3 H   (1.8-7.7)  K/mm3


 


Sodium    128 L  (137-145)  mmol/L


 


Chloride    89.6 L  ()  mmol/L


 


Carbon Dioxide    20 L  (22-30)  mmol/L


 


BUN    54 H  (7-17)  mg/dL


 


Creatinine    4.9 H  (0.7-1.2)  mg/dL


 


Glucose    121 H  ()  mg/dL


 


POC Glucose  153 H    ()  


 


Total Creatine Kinase    387 H  ()  units/L


 


Troponin T    0.053 H  (0.00-0.029)  ng/mL


 


Albumin    2.8 L  (3.9-5)  g/dL


 


LDL Cholesterol Direct    27 L  ()  mg/dL


 


HDL Cholesterol    32 L  (40-59)  mg/dL


 


TSH     (0.270-4.200)  mlU/mL














  08/15/18 Range/Units





  15:05 


 


WBC   (4.5-11.0)  K/mm3


 


RBC   (3.65-5.03)  M/mm3


 


RDW   (13.2-15.2)  %


 


Seg Neuts % (Manual)   (40.0-70.0)  %


 


Lymphocytes % (Manual)   (13.4-35.0)  %


 


Seg Neutrophils # Man   (1.8-7.7)  K/mm3


 


Sodium   (137-145)  mmol/L


 


Chloride   ()  mmol/L


 


Carbon Dioxide   (22-30)  mmol/L


 


BUN   (7-17)  mg/dL


 


Creatinine   (0.7-1.2)  mg/dL


 


Glucose   ()  mg/dL


 


POC Glucose   ()  


 


Total Creatine Kinase   ()  units/L


 


Troponin T   (0.00-0.029)  ng/mL


 


Albumin   (3.9-5)  g/dL


 


LDL Cholesterol Direct   ()  mg/dL


 


HDL Cholesterol   (40-59)  mg/dL


 


TSH  7.530 H  (0.270-4.200)  mlU/mL














Assessment and Plan





- Patient Problems


(1) Sepsis


Current Visit: Yes   Status: Acute   


Qualifiers: 


   Sepsis type: sepsis due to unspecified organism   Qualified Code(s): A41.9 - 

Sepsis, unspecified organism   


Plan to address problem: 


IV antibiotic therapy, blood cultures, chest x ray, urinalysis, serial lactic 

acid levels, monitor uop q shift, 








(2) Hyponatremia syndrome


Current Visit: Yes   Status: Acute   


Plan to address problem: 


IVF resuscitation therapy. 








(3) Acidosis


Current Visit: Yes   Status: Acute   


Plan to address problem: 


IVF resuscitation therapy, treat sepsis, repeat lactic acid level. 








(4) ARF (acute renal failure)


Current Visit: Yes   Status: Acute   


Qualifiers: 


   Acute renal failure type: with acute tubular necrosis   Qualified Code(s): 

N17.0 - Acute kidney failure with tubular necrosis   


Plan to address problem: 


Monitor uop q shift, nephrology consulted in ED, urine electrolytes, IVF 

resuscitation, avoid nephrotoxic agents. 








(5) Hypothyroid


Current Visit: Yes   Status: Acute   


Qualifiers: 


   Hypothyroidism type: acquired   Qualified Code(s): E03.9 - Hypothyroidism, 

unspecified   


Plan to address problem: 


Resume synthroid therpay, supportive care, thyroid panel











(6) Atrial fibrillation


Current Visit: Yes   Status: Acute   


Qualifiers: 


   Atrial fibrillation type: paroxysmal   Qualified Code(s): I48.0 - Paroxysmal 

atrial fibrillation   


Plan to address problem: 


Beta blocker therapy, pt converted to Normal sinus rhythm, Admit to telemetry. 

Cardiology consulted in ED








(7) DVT prophylaxis


Current Visit: Yes   Status: Acute   


Plan to address problem: 


SCD to BLE while in bed.

## 2018-08-15 NOTE — CAT SCAN REPORT
CT HEAD WITHOUT CONTRAST



INDICATION: Altered mental status.



COMPARISON: 5/19/2018.



FINDINGS: Noncontrast head CT demonstrates stable, age-appropriate 

ventricles and sulci with mild periventricular and few white matter 

hypodense small vessel ischemic disease.  No definite acute infarct, 

hemorrhage, mass effect or midline shift.  No abnormal extra axial 

fluid collections.



Grossly normal posterior fossa with preserved basilar cisterns.  Stable 

calcified left eye globe with post surgical changes as also right 

cataract surgery.  Streak artifact from few radiopaque dental material 

limits exam.  Clear imaged paranasal sinuses and mastoid air cells.  

Atherosclerotic ICA and vertebral artery calcifications.  Normal 

calvarium and scalp.



CONCLUSION: No acute intracranial CT abnormality or significant 

interval change, as described.



Thank you for the opportunity to participate in this patient's care.

## 2018-08-15 NOTE — EMERGENCY DEPARTMENT REPORT
ED General Adult HPI





- General


Chief complaint: Altered Mental Status


Stated complaint: ARMS/AFIB


Time Seen by Provider: 08/15/18 13:32


Source: patient, family, EMS (verbal report received from EMS.ems notes not 

available at time of  chart dictation), RN notes reviewed, old records reviewed


Mode of arrival: Stretcher


Limitations: Altered Mental Status, Physical Limitation





- History of Present Illness


Initial comments: 





This is an 83-year-old female who is not known to this provider previously.  

Her past medical history includes COPD, on home oxygen, hypertension, diabetes, 

heart disease.





She is also blind in her left eye, and has a surgical history appendectomy, CABG

, , and aortofemoral bypass.


Her primary care doctor is : Dennis





Cardiology: Dr. Kenny


This is an 83-year-old female who is not known to this provider previously.  

She is brought to the hospital by EMS.  EMS reports that they were contacted by 

family for patient laying in bed being unresponsive.  They report that then 

when they arrived on the scene the patient wasn't really responsive.  She was 

also apparently in A. fib with RVR in the field, and was hypotensive.  They 

reported a normal fingerstick.


EMS further reports that in the field, the patient's heart rate decreased, and 

that her mental status improved.  The patient is complaining of abdominal pain.

  She cannot describe the nature of the pain.  She does not describe 

exacerbating or relieving factors.  She is demented as per EMS and is a poor 

historian.  The  is currently at the bedside and indicates the patient 

is complaining of abdominal pain and back pain.











-: This morning


Radiation: other


Quality: other


Consistency: other


Improves with: other


Worsens with: other


Associated Symptoms: confusion, malaise, weakness





- Related Data


 Home Medications











 Medication  Instructions  Recorded  Confirmed  Last Taken


 


Aspirin [Aspirin BABY CHEW TAB] 81 mg PO BID 14 09:00





    81


 


Calcium Carbonate/Vitamin D3 1 mg PO BID 14 09:00





[Calcium 600-Vit D3 400 Tablet]    1


 


Carvedilol [Coreg] 12.5 mg PO BID 14





    12.5


 


Cetirizine HCl 10 mg PO QDAY 0514





    10


 


Ezetimibe [Zetia] 10 mg PO QDAY 14 09:00


 


Formoterol Fumarate [Foradil] 12 mg PO BID 14 09:00


 


Levothyroxine [Synthroid] 50 mcg PO QAM 14 06:00


 


Multivitamin [Multi-Vitamin Daily] 1 tab PO QDAY 14 09:

00





    1


 


Ramipril 10 mg PO QDAY 14





    40


 


Ranitidine HCl [Zantac 75 MG TAB] 150 mg PO BID 14 Unknown


 


Rosuvastatin Calcium [Crestor] 40 mg PO QHS 14





    40


 


Insulin Lispro [HumaLOG VIAL] 10 units SUB-Q TIDAC 14 Unknown


 


 Calcium Elemental 600 mg 600 mg PO DAILY 18 Unknown


 


Aspirin 81 mg PO DAILY 18 Unknown


 


Co Q-10 100 mg Softgel 100 mg PO DAILY 18 Unknown


 


Crestor 20 mg PO HS 18 Unknown


 


Exelon 1.5 mg PO BID 18 Unknown


 


Furosemide 20 mg PO 2XW 18 Unknown


 


Imdur ER 60 mg PO DAILY 18 Unknown


 


Memantine HCl 5 mg PO BID 18 Unknown


 


Ondansetron 4 mg PO BID PRN 18 Unknown


 


Ramipril 10 mg PO BID 18 Unknown


 


Ramipril 10 mg PO BID 18 Unknown


 


Ranitidine HCl 150 mg PO BID 18 Unknown


 


Ranitidine HCl 150 mg PO BID 18


 


Spiriva Respimat 2 puff INHALATION DAILY 18 Unknown


 


Toprol Xl 25 mg PO DAILY 18 Unknown


 


Xiidra 1 drop OD BID 18 Unknown


 


traMADol 50 mg PO Q6HR PRN 18 Unknown








 Previous Rx's











 Medication  Instructions  Recorded  Last Taken  Type


 


Ipratropium/Albuterol Sulfate 1 ampul IH TIDRT 30 Days  ampul.neb 14 

Unknown Rx





[DUONEB *Not for PRN Use*]    


 


Saccharomyces Boulardii (Nf) 250 mg PO Q12HR #20 capsule 14 Unknown Rx





[Florastor (Nf)]    


 


guaiFENesin ER [Mucinex ER] 600 mg PO BID #14 tablet 14 Unknown Rx


 


levoFLOXacin [Levaquin TAB] 750 mg PO QDAY #5 tablet 14 Unknown Rx


 


Ondansetron [Zofran ODT TAB] 4 mg PO Q8HR PRN #14 tab.rapdis 04/10/18 Unknown Rx


 


oxyCODONE /ACETAMINOPHEN [Percocet 1 tab PO Q6HR PRN #10 tablet 04/10/18 

Unknown Rx





5/325 mg]    











 Allergies











Allergy/AdvReac Type Severity Reaction Status Date / Time


 


hydromorphone HCl Allergy Severe Rash Verified 14 05:48





[From Dilaudid]     


 


codeine AdvReac  Swelling Verified 14 13:32


 


hydroxyprogesterone caproate AdvReac  Seizure Verified 14 05:48





[From Delalutin]     














ED Review of Systems


ROS: 


Stated complaint: ARMS/AFIB


Other details as noted in HPI





Comment: Unobtainable due to pts medical conditions





ED Past Medical Hx





- Past Medical History


Hx Hypertension: Yes


Hx CVA: No


Hx Heart Attack/AMI: No


Hx Congestive Heart Failure: Yes


Hx Diabetes: Yes


Hx Deep Vein Thrombosis: No


Hx Pulmonary Embolism: No


Hx GERD: No


Hx Liver Disease: No


Hx Renal Disease: No


Hx Sickle Cell Disease: No


Hx Arthritis: Yes


Hx Headaches / Migraines: No


Hx Seizures: No


Hx Kidney Stones: No


Hx Psychiatric Treatment: No


Hx Asthma: No


Hx COPD: Yes


Hx Tuberculosis: No


Hx Dementia: No


Hx HIV: No


Additional medical history: hypothyroidism





- Surgical History


Hx Coronary Stent: Yes


Hx Open Heart Surgery: No


Hx Pacemaker: No


Hx Internal Defibrillator: No


Hx Cholecystectomy: No


Hx Appendectomy: Yes


Hx Breast Surgery: No


Additional Surgical History: insulin pump, 





- Social History


Smoking Status: Never Smoker





- Medications


Home Medications: 


 Home Medications











 Medication  Instructions  Recorded  Confirmed  Last Taken  Type


 


Aspirin [Aspirin BABY CHEW TAB] 81 mg PO BID 14 09:00 

History





    81 


 


Calcium Carbonate/Vitamin D3 1 mg PO BID 14 09:00 

History





[Calcium 600-Vit D3 400 Tablet]    1 


 


Carvedilol [Coreg] 12.5 mg PO BID 14 History





    12.5 


 


Cetirizine HCl 10 mg PO QDAY 14 History





    10 


 


Ezetimibe [Zetia] 10 mg PO QDAY 14 09:00 History


 


Formoterol Fumarate [Foradil] 12 mg PO BID 14 09:00 

History


 


Levothyroxine [Synthroid] 50 mcg PO QAM 14 06:00 History


 


Multivitamin [Multi-Vitamin Daily] 1 tab PO QDAY 14 09:

00 History





    1 


 


Ramipril 10 mg PO QDAY 14 History





    40 


 


Ranitidine HCl [Zantac 75 MG TAB] 150 mg PO BID 14 Unknown 

History


 


Rosuvastatin Calcium [Crestor] 40 mg PO QHS 14 History





    40 


 


Insulin Lispro [HumaLOG VIAL] 10 units SUB-Q TIDAC 14 Unknown 

History


 


Ipratropium/Albuterol Sulfate 1 ampul IH TIDRT 30 Days  ampul.neb 14  

Unknown Rx





[DUONEB *Not for PRN Use*]     


 


Saccharomyces Boulardii (Nf) 250 mg PO Q12HR #20 capsule 14  Unknown Rx





[Florastor (Nf)]     


 


guaiFENesin ER [Mucinex ER] 600 mg PO BID #14 tablet 14  Unknown Rx


 


levoFLOXacin [Levaquin TAB] 750 mg PO QDAY #5 tablet 14  Unknown Rx


 


Ondansetron [Zofran ODT TAB] 4 mg PO Q8HR PRN #14 tab.rapdis 04/10/18  Unknown 

Rx


 


oxyCODONE /ACETAMINOPHEN [Percocet 1 tab PO Q6HR PRN #10 tablet 04/10/18  

Unknown Rx





5/325 mg]     


 


 Calcium Elemental 600 mg 600 mg PO DAILY 18 Unknown History


 


Aspirin 81 mg PO DAILY 18 Unknown History


 


Co Q-10 100 mg Softgel 100 mg PO DAILY 18 Unknown History


 


Crestor 20 mg PO HS 18 Unknown History


 


Exelon 1.5 mg PO BID 18 Unknown History


 


Furosemide 20 mg PO 2XW 18 Unknown History


 


Imdur ER 60 mg PO DAILY 18 Unknown History


 


Memantine HCl 5 mg PO BID 18 Unknown History


 


Ondansetron 4 mg PO BID PRN 18 Unknown History


 


Ramipril 10 mg PO BID 18 Unknown History


 


Ramipril 10 mg PO BID 18 Unknown History


 


Ranitidine HCl 150 mg PO BID 18 Unknown History


 


Ranitidine HCl 150 mg PO BID 18 History


 


Spiriva Respimat 2 puff INHALATION DAILY 18 Unknown History


 


Toprol Xl 25 mg PO DAILY 18 Unknown History


 


Xiidra 1 drop OD BID 18 Unknown History


 


traMADol 50 mg PO Q6HR PRN 18 Unknown History














ED Physical Exam





- General


Limitations: Altered Mental Status, Other (poor historian)


General appearance: in distress





- Head


Head exam: Present: atraumatic, normocephalic





- Eye


Eye exam: Present: EOMI.  Absent: normal appearance (left eye is cloudy, and 

patient is blind in left eye)





- ENT


ENT exam: Present: mucous membranes dry





- Neck


Neck exam: Present: normal inspection, full ROM





- Respiratory


Respiratory exam: Present: chest wall tenderness, decreased breath sounds





- Cardiovascular


Cardiovascular Exam: Present: regular rate, normal rhythm, normal heart sounds





- GI/Abdominal


GI/Abdominal exam: Present: soft, tenderness, other (the patient has a palpable 

tube on her left flank which descends, it is somewhat tender, there is no 

redness, pus or streaking, the patient's  endorses that this is part of 

her surgical anatomy.).  Absent: pulsatile mass





- Extremities Exam


Extremities exam: Present: normal inspection, other (2+ pulses noted in the 

bilateral upper, lower extremities.  Femoral pulses are intact bilaterally.).  

Absent: tenderness, calf tenderness





- Back Exam


Back exam: Present: paraspinal tenderness





- Neurological Exam


Neurological exam: Present: altered (patient is alert to name.  She follows 

commands.  She has 5 out of 5 strength in the upper, lower extremities.  

Sensation is intact to light touch of the upper, lower extremities)





- Psychiatric


Psychiatric exam: Present: anxious





- Skin


Skin exam: Present: dry





ED Course


 Vital Signs











  08/15/18





  13:49


 


Temperature 98.0 F


 


Pulse Rate 118 H


 


Blood Pressure 167/61


 


O2 Sat by Pulse 93





Oximetry 














- Reevaluation(s)


Reevaluation #1: 





08/15/18 13:57


Differential diagnosis, including but not limited to: Intracranial bleed, stroke

, AAA, pneumonia, urinary tract infection, complication of aortofemoral bypass, 

electrolyte derangement, A. fib with RVR, now resolved











Assessment and plan: 83-year-old female with resolved A. fib with RVR, still in 

A. fib, with abdominal tenderness and back pain.  She has a blood pressure 170 

at this time.  She is a poor historian.  She has known chronic renal 

insufficiency.  X-ray of the chest is unremarkable.  Noncontrast CT scan of the 

brain is pending, noncontrast CT scan of the abdomen and pelvis is pending, she 

will be given pain medication


Reevaluation #2: 





08/15/18 15:26


X-ray of the chest shows no acute findings and is unchanged when compared to 

prior.  Noncontrast CT scan of the brain is negative for acute disease.


Reevaluation #3: 





08/15/18 15:55








Laboratory studies indicate leukocytosis, worsening renal insufficiency.  

Patient meeting criteria for systemic inflammatory response syndrome.  Her CT 

scan of the abdomen and pelvis suggest inflammation of the distal colon as per 

my discussion with the radiologist.  Additional IV fluids, antibiotics ordered.


Case is discussed with nephrology on-call, Dr. Carrillo, who will follow in 

consultation.  Laboratory studies also show hyponatremia, and given the current 

clinical picture, I suspect hypovolemic hyponatremia as part of a possible 

sepsis syndrome.








The Hospital physicians paced to arrange admission.





Advanced directives were discussed with the patient's , who indicated 

full code at this time.





Reevaluation #4: 





08/15/18 16:21


Dr. Rush has accepted the patient to the medical service.





- EJ/Peripheral Line


  ** Leg L


Time Out Performed: Yes


Indications: nurses unable to establis


Skin Cleansed in Sterile Fashion: Yes


Size: 22


Dressing Placed: Tegaderm


Patient Tolerated Procedure: well





ED Medical Decision Making





- Lab Data


Result diagrams: 


 08/15/18 15:05





 08/15/18 15:05





- EKG Data


Rate: tachycardia





- EKG Data





08/15/18 13:59


Sinus tachycardia, left axis deviation, low voltage, right bundle branch block, 

premature atrial complex, abnormal EKG, QTc prolonged.  Not a STEMI.  Appears 

mostly unchanged from prior.





- Radiology Data


Radiology results: report reviewed, image reviewed


Critical care attestation.: 


If time is entered above; I have spent that time in minutes in the direct care 

of this critically ill patient, excluding procedure time.








ED Disposition


Clinical Impression: 


 History of atrial fibrillation, Weakness, SIRS (systemic inflammatory response 

syndrome), FELICIA (acute kidney injury)





Abdominal pain


Qualifiers:


 Abdominal location: unspecified location Qualified Code(s): R10.9 - 

Unspecified abdominal pain





Disposition:  OP ADMIT IP TO THIS HOSP


Is pt being admited?: Yes


Condition: Poor


Referrals: 


PRIMARY CARE,MD [Primary Care Provider] - 3-5 Days

## 2018-08-16 LAB
BACTERIA #/AREA URNS HPF: (no result) /HPF
BILIRUB UR QL STRIP: (no result)
BLOOD UR QL VISUAL: (no result)
BUN SERPL-MCNC: 57 MG/DL (ref 7–17)
BUN/CREAT SERPL: 12 %
CALCIUM SERPL-MCNC: 7.4 MG/DL (ref 8.4–10.2)
CREATININE,URINE: 47.8 MG/DL (ref 0.1–20)
HEMOLYSIS INDEX: 12
PH UR STRIP: 5 [PH] (ref 5–7)
RBC #/AREA URNS HPF: 1 /HPF (ref 0–6)
UROBILINOGEN UR-MCNC: < 2 MG/DL (ref ?–2)
WBC #/AREA URNS HPF: 3 /HPF (ref 0–6)

## 2018-08-16 RX ADMIN — MEMANTINE SCH MG: 5 TABLET ORAL at 21:53

## 2018-08-16 RX ADMIN — PIPERACILLIN SODIUM AND TAZOBACTAM SODIUM SCH MLS/HR: 2; .25 INJECTION, POWDER, LYOPHILIZED, FOR SOLUTION INTRAVENOUS at 13:24

## 2018-08-16 RX ADMIN — FAMOTIDINE SCH MG: 20 TABLET ORAL at 00:32

## 2018-08-16 RX ADMIN — IPRATROPIUM BROMIDE AND ALBUTEROL SULFATE SCH AMPUL: .5; 3 SOLUTION RESPIRATORY (INHALATION) at 12:56

## 2018-08-16 RX ADMIN — GUAIFENESIN SCH MG: 600 TABLET ORAL at 10:16

## 2018-08-16 RX ADMIN — MULTIVITAMIN TABLET SCH EACH: TABLET at 10:17

## 2018-08-16 RX ADMIN — IPRATROPIUM BROMIDE AND ALBUTEROL SULFATE SCH AMPUL: .5; 3 SOLUTION RESPIRATORY (INHALATION) at 15:01

## 2018-08-16 RX ADMIN — ASPIRIN SCH MG: 81 TABLET, CHEWABLE ORAL at 00:30

## 2018-08-16 RX ADMIN — Medication SCH ML: at 10:34

## 2018-08-16 RX ADMIN — EZETIMIBE SCH: 10 TABLET ORAL at 10:34

## 2018-08-16 RX ADMIN — MEMANTINE SCH MG: 5 TABLET ORAL at 10:17

## 2018-08-16 RX ADMIN — ASPIRIN SCH MG: 81 TABLET, CHEWABLE ORAL at 21:53

## 2018-08-16 RX ADMIN — GUAIFENESIN SCH MG: 600 TABLET ORAL at 00:31

## 2018-08-16 RX ADMIN — FAMOTIDINE SCH MG: 20 TABLET ORAL at 10:17

## 2018-08-16 RX ADMIN — PIPERACILLIN SODIUM AND TAZOBACTAM SODIUM SCH MLS/HR: 2; .25 INJECTION, POWDER, LYOPHILIZED, FOR SOLUTION INTRAVENOUS at 05:57

## 2018-08-16 RX ADMIN — IPRATROPIUM BROMIDE AND ALBUTEROL SULFATE SCH AMPUL: .5; 3 SOLUTION RESPIRATORY (INHALATION) at 20:23

## 2018-08-16 RX ADMIN — FAMOTIDINE SCH MG: 20 TABLET ORAL at 21:53

## 2018-08-16 RX ADMIN — SODIUM CHLORIDE SCH MLS/HR: 0.9 INJECTION, SOLUTION INTRAVENOUS at 10:19

## 2018-08-16 RX ADMIN — SODIUM CHLORIDE SCH MLS/HR: 0.9 INJECTION, SOLUTION INTRAVENOUS at 21:58

## 2018-08-16 RX ADMIN — GUAIFENESIN SCH MG: 600 TABLET ORAL at 21:54

## 2018-08-16 RX ADMIN — MEMANTINE SCH MG: 5 TABLET ORAL at 00:31

## 2018-08-16 RX ADMIN — Medication SCH EACH: at 00:32

## 2018-08-16 RX ADMIN — LEVOTHYROXINE SODIUM SCH MCG: 0.05 TABLET ORAL at 05:57

## 2018-08-16 RX ADMIN — Medication SCH EACH: at 21:53

## 2018-08-16 RX ADMIN — Medication SCH ML: at 00:32

## 2018-08-16 RX ADMIN — METRONIDAZOLE SCH MLS/HR: 5 INJECTION, SOLUTION INTRAVENOUS at 13:24

## 2018-08-16 RX ADMIN — PIPERACILLIN SODIUM AND TAZOBACTAM SODIUM SCH MLS/HR: 2; .25 INJECTION, POWDER, LYOPHILIZED, FOR SOLUTION INTRAVENOUS at 21:55

## 2018-08-16 RX ADMIN — PIPERACILLIN SODIUM AND TAZOBACTAM SODIUM SCH MLS/HR: 2; .25 INJECTION, POWDER, LYOPHILIZED, FOR SOLUTION INTRAVENOUS at 00:32

## 2018-08-16 RX ADMIN — Medication SCH EACH: at 10:16

## 2018-08-16 RX ADMIN — METRONIDAZOLE SCH MLS/HR: 5 INJECTION, SOLUTION INTRAVENOUS at 21:48

## 2018-08-16 RX ADMIN — LORATADINE SCH MG: 10 TABLET ORAL at 10:17

## 2018-08-16 NOTE — PROGRESS NOTE
Assessment and Plan


Assessment and plan: 


--Sepsis; Continue empiric antibiotics, follow cultures, IV fluids and 

supportive care





--Hyponatremia; replacement therapy, treat underlying cause 





--Acute kidney injury; secondary to ATN


Gentle hydration, closely monitor renal function, avoid nephrotoxins, 

nephrology following





--History of coronary artery disease status post CABG;


Continue current cardiac medications, supportive care, cardiology following





--Atrial fibrillation; continue beta blockers, management per cardiology





--Hypothyroidism; continue Synthroid, thyroid panel reviewed





--Abdominal pain with nausea ;


Abnormal CT abdomen; thickening of the sigmoid colon, consult GI





--Severe malnutrition; supportive care, nutrition supplements, nutrition consult





--DVT prophylaxis; SCDs, Lovenox renal dose





--Full CODE STATUS





Closely monitor the patient and adjust management as needed


Consults and recommendations noted and appreciated
















































































History


Interval history: 


Patient seen and examined this morning medical records reviewed


Admitted with altered level of consciousness , acute kidney injury and sepsis 

as well as chest pain


Evaluated by cardiology and nephrology, No new events reported by the nursing


Patient is critically ill looking, minimally communicative, in mild distress


Vital Signs reviewed











Hospitalist Physical





- Constitutional


Vitals: 


 











Temp Pulse Resp BP Pulse Ox


 


 97.9 F   74   18   92/35   100 


 


 08/16/18 08:23  08/16/18 08:23  08/16/18 08:23  08/16/18 08:23  08/16/18 08:23











General appearance: Present: mild distress, cachectic, disheveled





- EENT


Eyes: Present: PERRL, EOM intact





- Neck


Neck: Present: supple, normal ROM





- Respiratory


Respiratory effort: normal


Respiratory: bilateral: diminished, rhonchi, negative: rales, wheezing





- Cardiovascular


Rhythm: regular


Heart Sounds: Present: S1 & S2





- Extremities


Extremities: no ischemia, No edema





- Abdominal


General gastrointestinal: soft, non-tender, non-distended, normal bowel sounds





- Integumentary


Integumentary: Present: clear, warm





- Psychiatric


Psychiatric: cooperative, other (minimally communicative)





- Neurologic


Neurologic: other (minimally communicative)





Results





- Labs


CBC & Chem 7: 


 08/17/18 06:17





 08/17/18 06:17


Labs: 


 Laboratory Last Values











WBC  18.7 K/mm3 (4.5-11.0)  H  08/15/18  15:05    


 


RBC  3.52 M/mm3 (3.65-5.03)  L  08/15/18  15:05    


 


Hgb  10.4 gm/dl (10.1-14.3)   08/15/18  15:05    


 


Hct  31.1 % (30.3-42.9)   08/15/18  15:05    


 


MCV  88 fl (79-97)   08/15/18  15:05    


 


MCH  30 pg (28-32)   08/15/18  15:05    


 


MCHC  33 % (30-34)   08/15/18  15:05    


 


RDW  15.6 % (13.2-15.2)  H  08/15/18  15:05    


 


Plt Count  197 K/mm3 (140-440)   08/15/18  15:05    


 


Add Manual Diff  Complete   08/15/18  15:05    


 


Total Counted  100   08/15/18  15:05    


 


Seg Neuts % (Manual)  87.0 % (40.0-70.0)  H  08/15/18  15:05    


 


Band Neutrophils %  0 %  08/15/18  15:05    


 


Lymphocytes % (Manual)  9.0 % (13.4-35.0)  L  08/15/18  15:05    


 


Reactive Lymphs % (Man)  0 %  08/15/18  15:05    


 


Monocytes % (Manual)  4.0 % (0.0-7.3)   08/15/18  15:05    


 


Eosinophils % (Manual)  0 % (0.0-4.3)   08/15/18  15:05    


 


Basophils % (Manual)  0 % (0.0-1.8)   08/15/18  15:05    


 


Metamyelocytes %  0 %  08/15/18  15:05    


 


Myelocytes %  0 %  08/15/18  15:05    


 


Promyelocytes %  0 %  08/15/18  15:05    


 


Blast Cells %  0 %  08/15/18  15:05    


 


Nucleated RBC %  Not Reportable   08/15/18  15:05    


 


Seg Neutrophils # Man  16.3 K/mm3 (1.8-7.7)  H  08/15/18  15:05    


 


Band Neutrophils #  0.0 K/mm3  08/15/18  15:05    


 


Lymphocytes # (Manual)  1.7 K/mm3 (1.2-5.4)   08/15/18  15:05    


 


Abs React Lymphs (Man)  0.0 K/mm3  08/15/18  15:05    


 


Monocytes # (Manual)  0.7 K/mm3 (0.0-0.8)   08/15/18  15:05    


 


Eosinophils # (Manual)  0.0 K/mm3 (0.0-0.4)   08/15/18  15:05    


 


Basophils # (Manual)  0.0 K/mm3 (0.0-0.1)   08/15/18  15:05    


 


Metamyelocytes #  0.0 K/mm3  08/15/18  15:05    


 


Myelocytes #  0.0 K/mm3  08/15/18  15:05    


 


Promyelocytes #  0.0 K/mm3  08/15/18  15:05    


 


Blast Cells #  0.0 K/mm3  08/15/18  15:05    


 


WBC Morphology  Not Reportable   08/15/18  15:05    


 


Hypersegmented Neuts  Not Reportable   08/15/18  15:05    


 


Hyposegmented Neuts  Not Reportable   08/15/18  15:05    


 


Hypogranular Neuts  Not Reportable   08/15/18  15:05    


 


Smudge Cells  Not Reportable   08/15/18  15:05    


 


Toxic Granulation  Not Reportable   08/15/18  15:05    


 


Toxic Vacuolation  Not Reportable   08/15/18  15:05    


 


Dohle Bodies  Not Reportable   08/15/18  15:05    


 


Pelger-Huet Anomaly  Not Reportable   08/15/18  15:05    


 


Chani Rods  Not Reportable   08/15/18  15:05    


 


Platelet Estimate  Consistent w auto   08/15/18  15:05    


 


Clumped Platelets  Not Reportable   08/15/18  15:05    


 


Plt Clumps, EDTA  Not Reportable   08/15/18  15:05    


 


Large Platelets  Not Reportable   08/15/18  15:05    


 


Giant Platelets  Not Reportable   08/15/18  15:05    


 


Platelet Satelliting  Not Reportable   08/15/18  15:05    


 


Plt Morphology Comment  Not Reportable   08/15/18  15:05    


 


RBC Morphology  Not Reportable   08/15/18  15:05    


 


Dimorphic RBCs  Not Reportable   08/15/18  15:05    


 


Polychromasia  Not Reportable   08/15/18  15:05    


 


Hypochromasia  Not Reportable   08/15/18  15:05    


 


Poikilocytosis  Few   08/15/18  15:05    


 


Anisocytosis  Few   08/15/18  15:05    


 


Microcytosis  Not Reportable   08/15/18  15:05    


 


Macrocytosis  Not Reportable   08/15/18  15:05    


 


Spherocytes  Not Reportable   08/15/18  15:05    


 


Pappenheimer Bodies  Not Reportable   08/15/18  15:05    


 


Sickle Cells  Not Reportable   08/15/18  15:05    


 


Target Cells  Not Reportable   08/15/18  15:05    


 


Tear Drop Cells  Not Reportable   08/15/18  15:05    


 


Ovalocytes  Not Reportable   08/15/18  15:05    


 


Helmet Cells  Not Reportable   08/15/18  15:05    


 


[place] Bodies  Not Reportable   08/15/18  15:05    


 


Cabot Rings  Not Reportable   08/15/18  15:05    


 


Radha Cells  Not Reportable   08/15/18  15:05    


 


Bite Cells  Not Reportable   08/15/18  15:05    


 


Crenated Cell  Not Reportable   08/15/18  15:05    


 


Elliptocytes  Not Reportable   08/15/18  15:05    


 


Acanthocytes (Spur)  Not Reportable   08/15/18  15:05    


 


Rouleaux  Not Reportable   08/15/18  15:05    


 


Hemoglobin C Crystals  Not Reportable   08/15/18  15:05    


 


Schistocytes  Not Reportable   08/15/18  15:05    


 


Malaria parasites  Not Reportable   08/15/18  15:05    


 


Hugo Bodies  Not Reportable   08/15/18  15:05    


 


Hem Pathologist Commnt  No   08/15/18  15:05    


 


APTT  24.4 Sec. (24.2-36.6)   08/15/18  15:05    


 


Sodium  129 mmol/L (137-145)  L  08/16/18  02:51    


 


Potassium  4.4 mmol/L (3.6-5.0)   08/16/18  02:51    


 


Chloride  95.0 mmol/L ()  L  08/16/18  02:51    


 


Carbon Dioxide  17 mmol/L (22-30)  L  08/16/18  02:51    


 


Anion Gap  21 mmol/L  08/16/18  02:51    


 


BUN  57 mg/dL (7-17)  H  08/16/18  02:51    


 


Creatinine  4.8 mg/dL (0.7-1.2)  H  08/16/18  02:51    


 


Estimated GFR  9 ml/min  08/16/18  02:51    


 


BUN/Creatinine Ratio  12 %  08/16/18  02:51    


 


Glucose  228 mg/dL ()  H  08/16/18  06:59    


 


POC Glucose  184  ()  H  08/16/18  07:33    


 


Lactic Acid  0.90 mmol/L (0.7-2.0)   08/16/18  00:40    


 


Calcium  7.4 mg/dL (8.4-10.2)  L  08/16/18  02:51    


 


Magnesium  2.30 mg/dL (1.7-2.3)   08/15/18  15:05    


 


Total Bilirubin  0.30 mg/dL (0.1-1.2)   08/15/18  15:05    


 


AST  33 units/L (5-40)   08/15/18  15:05    


 


ALT  11 units/L (7-56)   08/15/18  15:05    


 


Alkaline Phosphatase  97 units/L ()   08/15/18  15:05    


 


Total Creatine Kinase  97 units/L ()   08/16/18  02:51    


 


Troponin T  0.053 ng/mL (0.00-0.029)  H  08/15/18  15:05    


 


Total Protein  6.3 g/dL (6.3-8.2)   08/15/18  15:05    


 


Albumin  2.8 g/dL (3.9-5)  L  08/15/18  15:05    


 


Albumin/Globulin Ratio  0.8 %  08/15/18  15:05    


 


Triglycerides  135 mg/dL (2-149)   08/15/18  15:05    


 


Cholesterol  96 mg/dL ()   08/15/18  15:05    


 


LDL Cholesterol Direct  27 mg/dL ()  L  08/15/18  15:05    


 


HDL Cholesterol  32 mg/dL (40-59)  L  08/15/18  15:05    


 


Cholesterol/HDL Ratio  3.00 %  08/15/18  15:05    


 


TSH  7.530 mlU/mL (0.270-4.200)  H  08/15/18  15:05

## 2018-08-16 NOTE — CONSULTATION
History of Present Illness





- Reason for Consult


Consult date: 18


acute renal failure, hyponatremia





- History of Present Illness


The patient is an 82 YO Female with hsitory significant for HTN, CAD S/P CABG, 

DM-1, COPD, chronic Respiratory Failure on home O2 and CKD stage 3 presented to 

the ED for evaluation of AMS.  Pt is not able to provide any history at this 

time. Information was obtained from previous documentation and her daughter at 

the bedside.  The patient was found lying in bed unresponsive by her .  

EMS found her unresponsive, hypotensive and in Atrial Fib with RVR.  Pt was 

admitted with Atrial Fib with RVR, Sepsis and Acute kidney injury.  History was 

also significant for N, V, D and poor PO intake.  She received multiple IV 

fluid boluses yesterday.  Her primary Nephrologist is  at 

[place].  The creatinine increased from around 1.7 to 4.9.





Past History


Past Medical History: CAD, COPD, diabetes, hypertension, PVD, renal failure


Past Surgical History: appendectomy, CABG, , Other (Insulin pump, 

aortofemoral bypass. axillary)


Social history: , lives with family.  denies: smoking, alcohol abuse, 

prescription drug abuse


Family history: diabetes, hypertension





Medications and Allergies


 Allergies











Allergy/AdvReac Type Severity Reaction Status Date / Time


 


hydromorphone HCl Allergy Severe Rash Verified 14 05:48





[From Dilaudid]     


 


codeine AdvReac  Swelling Verified 14 13:32


 


hydroxyprogesterone caproate AdvReac  Seizure Verified 14 05:48





[From Delalutin]     











 Home Medications











 Medication  Instructions  Recorded  Confirmed  Last Taken  Type


 


Aspirin [Aspirin BABY CHEW TAB] 81 mg PO BID 05/28/14 08/15/18 08/14/18 History


 


Calcium Carbonate/Vitamin D3 1 mg PO BID 05/28/14 08/15/18 08/14/18 History





[Calcium 600-Vit D3 400 Tablet]     


 


Cetirizine HCl 10 mg PO QDAY 05/28/14 08/15/18 08/14/18 History


 


Multivitamin [Multi-Vitamin Daily] 1 tab PO QDAY 05/28/14 08/15/18 08/14/18 

History


 


Ramipril 10 mg PO BID 05/28/14 08/15/18 08/14/18 History


 


Rosuvastatin Calcium [Crestor] 20 mg PO QHS 05/28/14 08/15/18 08/14/18 History


 


Saccharomyces Boulardii (Nf) 250 mg PO Q12HR #20 capsule 06/05/14 08/15/18 08/14

/18 Rx





[Florastor (Nf)]     


 


Ondansetron [Zofran ODT TAB] 4 mg PO Q8HR PRN #14 tab.rapdis 04/10/18 08/15/18 

08/14/18 Rx


 


Co Q-10 100 mg Softgel 100 mg PO DAILY 08/01/18 08/15/18 08/14/18 History


 


Exelon 1.5 mg PO BID 08/01/18 08/15/18 08/14/18 History


 


Furosemide 20 mg PO 2XW 08/01/18 08/15/18 08/14/18 History


 


Imdur ER 60 mg PO DAILY 08/01/18 08/15/18 08/14/18 History


 


Memantine HCl 5 mg PO BID 08/01/18 08/15/18 08/14/18 History


 


Ranitidine HCl 150 mg PO BID 08/01/18 08/15/18 08/14/18 History


 


Spiriva Respimat 2 puff INHALATION DAILY 08/01/18 08/15/18 08/14/18 History


 


Toprol Xl 25 mg PO DAILY 08/01/18 08/15/18 08/14/18 History


 


Xiidra 1 drop OD BID 08/01/18 08/15/18 08/14/18 History


 


traMADol 50 mg PO Q6HR PRN 08/01/18 08/15/18 08/14/18 History


 


Levothyroxine [Synthroid] 50 mcg PO QAM 08/15/18 08/15/18 08/14/18 History











Active Meds: 


Active Medications





Acetaminophen (Tylenol)  650 mg PO Q4H PRN


   PRN Reason: Pain MILD(1-3)/Fever >100.5/HA


Albuterol (Proventil)  2.5 mg IH Q4HRT PRN


   PRN Reason: Shortness Of Breath


Albuterol/Ipratropium (Duoneb *Not For Prn Use*)  1 ampul IH TIDRT [facility]


   Last Admin: 08/15/18 21:09 Dose:  Not Given


Aspirin (Baby Aspirin)  81 mg PO QHS [facility]


   Last Admin: 18 00:30 Dose:  81 mg


Atorvastatin Calcium (Lipitor)  40 mg PO QHS [facility]


   Last Admin: 18 00:31 Dose:  40 mg


Calcium/Vitamin D (Oysco D 500 Mg-200 Unit)  1 each PO BID [facility]


   Last Admin: 18 00:32 Dose:  1 each


Dextrose (D50w (25gm) Syringe)  50 ml IV PRN PRN


   PRN Reason: Hypoglycemia


Ezetimibe (Zetia)  10 mg PO QDAY [facility]


Famotidine (Pepcid)  20 mg PO BID [facility]


   Last Admin: 18 00:32 Dose:  20 mg


Guaifenesin (Mucinex Er)  600 mg PO BID [facility]


   Last Admin: 18 00:31 Dose:  600 mg


Piperacillin Sod/Tazobactam Sod (Zosyn/Ns 2.25 Gm/50ml)  2.25 gm in 50 mls @ 

100 mls/hr IV Q8HR [facility]


   Last Admin: 18 05:57 Dose:  100 mls/hr


Sodium Chloride (Nacl 0.9% 1000 Ml)  1,000 mls @ 75 mls/hr IV AS DIRECT [facility]


Insulin Human Lispro (Humalog)  10 unit SUB-Q TIDAC [facility]


   Last Admin: 18 08:41 Dose:  Not Given


Levothyroxine Sodium (Synthroid)  50 mcg PO QAM@0600 [facility]


   Last Admin: 18 05:57 Dose:  50 mcg


Loratadine (Claritin)  10 mg PO DAILY [facility]


Memantine (Namenda)  5 mg PO BID [facility]


   Last Admin: 18 00:31 Dose:  5 mg


Miscellaneous Medication (Formoterol Fumarate [Foradil])  12 mg PO BID [facility]


Miscellaneous Medication (Saccharomyces Boulardii (Nf))  250 mg PO Q12HR [facility]


Miscellaneous Medication (Xiidra)  1 drop OD BID [facility]


Multivitamins (Theragran Tab)  1 each PO DAILY [facility]


Ondansetron HCl (Zofran Odt)  4 mg PO Q8HR PRN


   PRN Reason: Nausea And Vomiting


Oxycodone/Acetaminophen (Percocet 5/325)  1 tab PO Q6HR PRN


   PRN Reason: Pain (4-6)


   Last Admin: 08/15/18 21:55 Dose:  1 tab


Sodium Chloride (Sodium Chloride Flush Syringe 10 Ml)  10 ml IV BID [facility]


   Last Admin: 18 00:32 Dose:  10 ml


Sodium Chloride (Sodium Chloride Flush Syringe 10 Ml)  10 ml IV PRN PRN


   PRN Reason: LINE FLUSH


Tramadol HCl (Ultram)  50 mg PO Q6H PRN


   PRN Reason: Pain, Moderate (4-6)











Review of Systems


ROS unobtainable: due to mental status





Exam





- Vital Signs


Vital signs: 


 Vital Signs











Temp Pulse BP Pulse Ox


 


 98.0 F   118 H  167/61   93 


 


 08/15/18 13:49  08/15/18 13:49  08/15/18 13:49  08/15/18 13:49














- General Appearance


General appearance: well-developed, appears stated age, other (not in distress)


EENT: ATNC


Neck: Present: neck supple, trachea midline


Respiratory: Clear to Ascultation


Heart: irregularly irregular, S1S2, no murmurs


Gastrointestinal: Present: normoactive bowel sounds.  Absent: tenderness


Integumentary: no rash, warm and dry


Neurologic: other (stuporous, opens eyes, non-verbal)


Musculoskeletal: Present: other (no edema)





Results





- Lab Results





 08/15/18 15:05





 18 06:59


 Most recent lab results











Calcium  7.4 mg/dL (8.4-10.2)  L  18  02:51    


 


Magnesium  2.30 mg/dL (1.7-2.3)   08/15/18  15:05    














- Image


Kidney/bladder ultrasound: other





Assessment and Plan





1. Acute kidney injury:


Lala superimposed on CKD in the setting of volume depletion and hypotension.


Continue maintenance IV fluids.


CT abdomen was negative for hydronephrosis.


Renal prognosis is guarded.





2. Hypotension:


Monitor.





3. Electrolytes:


Anion gap metabolic acidosis, continue IV fluids.


Normal lactate level.





4. Sepsis.





5. A.fib.





D/w her daughter at the bedside.

## 2018-08-16 NOTE — GASTROENTEROLOGY CONSULTATION
<JOSE BLACKMON - Last Filed: 18 11:11>





History of Present Illness





- Reason for Consult


Consult date: 18


abd pain, sigmoid colon thickening


Requesting physician: AMY J KOCHERLA





- History of Present Illness


Patient is a 84 y/o female with PMH of HTN, CAD (s/p CABG), DM, COPD, and 

chronic respiratory failure who was brought to ED by EMS yesterday after being 

found unresponsive and hypotensive. Upon admission she was found to have A-fib 

with RVR, sepsis, acute renal failure, as well as acidosis. She underwent an 

abd CT due to c/o abd pain that showed concentric wall thickening involving the 

proximal sigmoid colon with findings read to be consistent with C-diff colitis 

to which GI has been consulted. This morning patient was resting in bed w/o 

acute distress, noted to be disoriented and unable to provide history. History 

obtained via chart review and from patient's daughter at bedside. The daughter 

reports patient had an acute onset of watery non-bloody diarrhea last week 

which has continued with BMs x approximately 10/day with associated generalized 

abd pain. No BM so far today per nursing. No recent abx therapy or travel but 

the daughter reports her and the patient's  have also recently had some 

diarrhea. Denies fever, N/V, constipation, or signs of bleeding. No known 

previous colonoscopy. Daughter reports a possible Fhx of colon cancer with 

patient's sister but is unsure.








Past History


Past Medical History: CAD, COPD, diabetes, hypertension, PVD


Past Surgical History: appendectomy, CABG, , Other (Insulin pump, 

aortofemoral bypass. axillary)


Social history: , lives with family.  denies: smoking, alcohol abuse, 

prescription drug abuse


Family history: diabetes, hypertension





Medications and Allergies


 Allergies











Allergy/AdvReac Type Severity Reaction Status Date / Time


 


hydromorphone HCl Allergy Severe Rash Verified 14 05:48





[From Dilaudid]     


 


codeine AdvReac  Swelling Verified 14 13:32


 


hydroxyprogesterone caproate AdvReac  Seizure Verified 14 05:48





[From Delalutin]     











 Home Medications











 Medication  Instructions  Recorded  Confirmed  Last Taken  Type


 


Aspirin [Aspirin BABY CHEW TAB] 81 mg PO BID 05/28/14 08/15/18 08/14/18 History


 


Calcium Carbonate/Vitamin D3 1 mg PO BID 05/28/14 08/15/18 08/14/18 History





[Calcium 600-Vit D3 400 Tablet]     


 


Cetirizine HCl 10 mg PO QDAY 05/28/14 08/15/18 08/14/18 History


 


Multivitamin [Multi-Vitamin Daily] 1 tab PO QDAY 05/28/14 08/15/18 08/14/18 

History


 


Ramipril 10 mg PO BID 05/28/14 08/15/18 08/14/18 History


 


Rosuvastatin Calcium [Crestor] 20 mg PO QHS 05/28/14 08/15/18 08/14/18 History


 


Saccharomyces Boulardii (Nf) 250 mg PO Q12HR #20 capsule 06/05/14 08/15/18 08/14

/18 Rx





[Florastor (Nf)]     


 


Ondansetron [Zofran ODT TAB] 4 mg PO Q8HR PRN #14 tab.rapdis 04/10/18 08/15/18 

08/14/18 Rx


 


Co Q-10 100 mg Softgel 100 mg PO DAILY 08/01/18 08/15/18 08/14/18 History


 


Exelon 1.5 mg PO BID 08/01/18 08/15/18 08/14/18 History


 


Furosemide 20 mg PO 2XW 08/01/18 08/15/18 08/14/18 History


 


Imdur ER 60 mg PO DAILY 08/01/18 08/15/18 08/14/18 History


 


Memantine HCl 5 mg PO BID 08/01/18 08/15/18 08/14/18 History


 


Ranitidine HCl 150 mg PO BID 08/01/18 08/15/18 08/14/18 History


 


Spiriva Respimat 2 puff INHALATION DAILY 08/01/18 08/15/18 08/14/18 History


 


Toprol Xl 25 mg PO DAILY 08/01/18 08/15/18 08/14/18 History


 


Xiidra 1 drop OD BID 08/01/18 08/15/18 08/14/18 History


 


traMADol 50 mg PO Q6HR PRN 08/01/18 08/15/18 08/14/18 History


 


Levothyroxine [Synthroid] 50 mcg PO QAM 08/15/18 08/15/18 08/14/18 History











Active Meds: 


Active Medications





Acetaminophen (Tylenol)  650 mg PO Q4H PRN


   PRN Reason: Pain MILD(1-3)/Fever >100.5/HA


Albuterol (Proventil)  2.5 mg IH Q4HRT PRN


   PRN Reason: Shortness Of Breath


Albuterol/Ipratropium (Duoneb *Not For Prn Use*)  1 ampul IH TIDRT [facility]


   Last Admin: 08/15/18 21:09 Dose:  Not Given


Aspirin (Baby Aspirin)  81 mg PO QHS [facility]


   Last Admin: 18 00:30 Dose:  81 mg


Atorvastatin Calcium (Lipitor)  40 mg PO QHS [facility]


   Last Admin: 18 00:31 Dose:  40 mg


Calcium/Vitamin D (Oysco D 500 Mg-200 Unit)  1 each PO BID [facility]


   Last Admin: 18 10:16 Dose:  1 each


Dextrose (D50w (25gm) Syringe)  50 ml IV PRN PRN


   PRN Reason: Hypoglycemia


Ezetimibe (Zetia)  10 mg PO QDAY [facility]


   Last Admin: 18 10:34 Dose:  Not Given


Famotidine (Pepcid)  20 mg PO BID [facility]


   Last Admin: 18 10:17 Dose:  20 mg


Guaifenesin (Mucinex Er)  600 mg PO BID [facility]


   Last Admin: 18 10:16 Dose:  600 mg


Piperacillin Sod/Tazobactam Sod (Zosyn/Ns 2.25 Gm/50ml)  2.25 gm in 50 mls @ 

100 mls/hr IV Q8HR [facility]


   Last Admin: 18 05:57 Dose:  100 mls/hr


Sodium Chloride (Nacl 0.9% 1000 Ml)  1,000 mls @ 75 mls/hr IV AS DIRECT [facility]


   Last Admin: 18 10:19 Dose:  75 mls/hr


Insulin Human Lispro (Humalog)  10 unit SUB-Q TIDAC [facility]


   Last Admin: 18 08:41 Dose:  Not Given


Levothyroxine Sodium (Synthroid)  50 mcg PO QAM@0600 [facility]


   Last Admin: 18 05:57 Dose:  50 mcg


Loratadine (Claritin)  10 mg PO DAILY [facility]


   Last Admin: 18 10:17 Dose:  10 mg


Memantine (Namenda)  5 mg PO BID [facility]


   Last Admin: 18 10:17 Dose:  5 mg


Miscellaneous Medication (Formoterol Fumarate [Foradil])  12 mg PO BID [facility]


Miscellaneous Medication (Saccharomyces Boulardii (Nf))  250 mg PO Q12HR [facility]


Miscellaneous Medication (Xiidra)  1 drop OD BID [facility]


Multivitamins (Theragran Tab)  1 each PO DAILY [facility]


   Last Admin: 18 10:17 Dose:  1 each


Ondansetron HCl (Zofran Odt)  4 mg PO Q8HR PRN


   PRN Reason: Nausea And Vomiting


Oxycodone/Acetaminophen (Percocet 5/325)  1 tab PO Q6HR PRN


   PRN Reason: Pain (4-6)


   Last Admin: 08/15/18 21:55 Dose:  1 tab


Sodium Chloride (Sodium Chloride Flush Syringe 10 Ml)  10 ml IV BID [facility]


   Last Admin: 18 10:34 Dose:  10 ml


Sodium Chloride (Sodium Chloride Flush Syringe 10 Ml)  10 ml IV PRN PRN


   PRN Reason: LINE FLUSH


Tramadol HCl (Ultram)  50 mg PO Q6H PRN


   PRN Reason: Pain, Moderate (4-6)











Review of Systems





- Review of Systems


ROS unobtainable: due to mental status





Exam





- Constitutional


Vital Signs: 


 











Temp Pulse Resp BP Pulse Ox


 


 97.9 F   74   18   92/35   100 


 


 18 08:23  18 08:23  18 08:23  18 08:23  18 08:23











General appearance: no acute distress





- Respiratory


Respiratory: bilateral: CTA (anterior)





- Cardiovascular


Rhythm: irregularly irregular





- Gastrointestinal


General gastrointestinal: Present: soft, tender, non-distended, normal bowel 

sounds





- Neurologic


Neurological: disoriented





- Labs


CBC & Chem 7: 


 08/15/18 15:05





 18 06:59


Lab Results: 


 Laboratory Results - last 24 hr











  08/15/18 08/15/18 08/15/18





  14:01 15:05 15:05


 


WBC   18.7 H 


 


RBC   3.52 L 


 


Hgb   10.4 


 


Hct   31.1 


 


MCV   88 


 


MCH   30 


 


MCHC   33 


 


RDW   15.6 H 


 


Plt Count   197 


 


Add Manual Diff   Complete 


 


Total Counted   100 


 


Seg Neuts % (Manual)   87.0 H 


 


Band Neutrophils %   0 


 


Lymphocytes % (Manual)   9.0 L 


 


Reactive Lymphs % (Man)   0 


 


Monocytes % (Manual)   4.0 


 


Eosinophils % (Manual)   0 


 


Basophils % (Manual)   0 


 


Metamyelocytes %   0 


 


Myelocytes %   0 


 


Promyelocytes %   0 


 


Blast Cells %   0 


 


Nucleated RBC %   Not Reportable 


 


Seg Neutrophils # Man   16.3 H 


 


Band Neutrophils #   0.0 


 


Lymphocytes # (Manual)   1.7 


 


Abs React Lymphs (Man)   0.0 


 


Monocytes # (Manual)   0.7 


 


Eosinophils # (Manual)   0.0 


 


Basophils # (Manual)   0.0 


 


Metamyelocytes #   0.0 


 


Myelocytes #   0.0 


 


Promyelocytes #   0.0 


 


Blast Cells #   0.0 


 


WBC Morphology   Not Reportable 


 


Hypersegmented Neuts   Not Reportable 


 


Hyposegmented Neuts   Not Reportable 


 


Hypogranular Neuts   Not Reportable 


 


Smudge Cells   Not Reportable 


 


Toxic Granulation   Not Reportable 


 


Toxic Vacuolation   Not Reportable 


 


Dohle Bodies   Not Reportable 


 


Pelger-Huet Anomaly   Not Reportable 


 


Chani Rods   Not Reportable 


 


Platelet Estimate   Consistent w auto 


 


Clumped Platelets   Not Reportable 


 


Plt Clumps, EDTA   Not Reportable 


 


Large Platelets   Not Reportable 


 


Giant Platelets   Not Reportable 


 


Platelet Satelliting   Not Reportable 


 


Plt Morphology Comment   Not Reportable 


 


RBC Morphology   Not Reportable 


 


Dimorphic RBCs   Not Reportable 


 


Polychromasia   Not Reportable 


 


Hypochromasia   Not Reportable 


 


Poikilocytosis   Few 


 


Anisocytosis   Few 


 


Microcytosis   Not Reportable 


 


Macrocytosis   Not Reportable 


 


Spherocytes   Not Reportable 


 


Pappenheimer Bodies   Not Reportable 


 


Sickle Cells   Not Reportable 


 


Target Cells   Not Reportable 


 


Tear Drop Cells   Not Reportable 


 


Ovalocytes   Not Reportable 


 


Helmet Cells   Not Reportable 


 


Maloney-Lawnton Bodies   Not Reportable 


 


Cabot Rings   Not Reportable 


 


Radha Cells   Not Reportable 


 


Bite Cells   Not Reportable 


 


Crenated Cell   Not Reportable 


 


Elliptocytes   Not Reportable 


 


Acanthocytes (Spur)   Not Reportable 


 


Rouleaux   Not Reportable 


 


Hemoglobin C Crystals   Not Reportable 


 


Schistocytes   Not Reportable 


 


Malaria parasites   Not Reportable 


 


Hugo Bodies   Not Reportable 


 


Hem Pathologist Commnt   No 


 


APTT    24.4


 


Sodium   


 


Potassium   


 


Chloride   


 


Carbon Dioxide   


 


Anion Gap   


 


BUN   


 


Creatinine   


 


Estimated GFR   


 


BUN/Creatinine Ratio   


 


Glucose   


 


POC Glucose  153 H  


 


Lactic Acid   


 


Calcium   


 


Magnesium   


 


Total Bilirubin   


 


AST   


 


ALT   


 


Alkaline Phosphatase   


 


Total Creatine Kinase   


 


Troponin T   


 


Total Protein   


 


Albumin   


 


Albumin/Globulin Ratio   


 


Triglycerides   


 


Cholesterol   


 


LDL Cholesterol Direct   


 


HDL Cholesterol   


 


Cholesterol/HDL Ratio   


 


TSH   














  08/15/18 08/15/18 08/15/18





  15:05 15:05 15:05


 


WBC   


 


RBC   


 


Hgb   


 


Hct   


 


MCV   


 


MCH   


 


MCHC   


 


RDW   


 


Plt Count   


 


Add Manual Diff   


 


Total Counted   


 


Seg Neuts % (Manual)   


 


Band Neutrophils %   


 


Lymphocytes % (Manual)   


 


Reactive Lymphs % (Man)   


 


Monocytes % (Manual)   


 


Eosinophils % (Manual)   


 


Basophils % (Manual)   


 


Metamyelocytes %   


 


Myelocytes %   


 


Promyelocytes %   


 


Blast Cells %   


 


Nucleated RBC %   


 


Seg Neutrophils # Man   


 


Band Neutrophils #   


 


Lymphocytes # (Manual)   


 


Abs React Lymphs (Man)   


 


Monocytes # (Manual)   


 


Eosinophils # (Manual)   


 


Basophils # (Manual)   


 


Metamyelocytes #   


 


Myelocytes #   


 


Promyelocytes #   


 


Blast Cells #   


 


WBC Morphology   


 


Hypersegmented Neuts   


 


Hyposegmented Neuts   


 


Hypogranular Neuts   


 


Smudge Cells   


 


Toxic Granulation   


 


Toxic Vacuolation   


 


Dohle Bodies   


 


Pelger-Huet Anomaly   


 


Chani Rods   


 


Platelet Estimate   


 


Clumped Platelets   


 


Plt Clumps, EDTA   


 


Large Platelets   


 


Giant Platelets   


 


Platelet Satelliting   


 


Plt Morphology Comment   


 


RBC Morphology   


 


Dimorphic RBCs   


 


Polychromasia   


 


Hypochromasia   


 


Poikilocytosis   


 


Anisocytosis   


 


Microcytosis   


 


Macrocytosis   


 


Spherocytes   


 


Pappenheimer Bodies   


 


Sickle Cells   


 


Target Cells   


 


Tear Drop Cells   


 


Ovalocytes   


 


Helmet Cells   


 


Maloney-Lawnton Bodies   


 


Cabot Rings   


 


Radha Cells   


 


Bite Cells   


 


Crenated Cell   


 


Elliptocytes   


 


Acanthocytes (Spur)   


 


Rouleaux   


 


Hemoglobin C Crystals   


 


Schistocytes   


 


Malaria parasites   


 


Hugo Bodies   


 


Hem Pathologist Commnt   


 


APTT   


 


Sodium  128 L  


 


Potassium  4.6  


 


Chloride  89.6 L  


 


Carbon Dioxide  20 L  


 


Anion Gap  23  


 


BUN  54 H  


 


Creatinine  4.9 H  


 


Estimated GFR  9  


 


BUN/Creatinine Ratio  11  


 


Glucose  121 H  


 


POC Glucose   


 


Lactic Acid   1.00 


 


Calcium  8.4  


 


Magnesium    2.30


 


Total Bilirubin  0.30  


 


AST  33  


 


ALT  11  


 


Alkaline Phosphatase  97  


 


Total Creatine Kinase  387 H  


 


Troponin T  0.053 H  


 


Total Protein  6.3  


 


Albumin  2.8 L  


 


Albumin/Globulin Ratio  0.8  


 


Triglycerides  135  


 


Cholesterol  96  


 


LDL Cholesterol Direct  27 L  


 


HDL Cholesterol  32 L  


 


Cholesterol/HDL Ratio  3.00  


 


TSH   














  08/15/18 08/15/18 08/16/18





  15:05 21:45 00:40


 


WBC   


 


RBC   


 


Hgb   


 


Hct   


 


MCV   


 


MCH   


 


MCHC   


 


RDW   


 


Plt Count   


 


Add Manual Diff   


 


Total Counted   


 


Seg Neuts % (Manual)   


 


Band Neutrophils %   


 


Lymphocytes % (Manual)   


 


Reactive Lymphs % (Man)   


 


Monocytes % (Manual)   


 


Eosinophils % (Manual)   


 


Basophils % (Manual)   


 


Metamyelocytes %   


 


Myelocytes %   


 


Promyelocytes %   


 


Blast Cells %   


 


Nucleated RBC %   


 


Seg Neutrophils # Man   


 


Band Neutrophils #   


 


Lymphocytes # (Manual)   


 


Abs React Lymphs (Man)   


 


Monocytes # (Manual)   


 


Eosinophils # (Manual)   


 


Basophils # (Manual)   


 


Metamyelocytes #   


 


Myelocytes #   


 


Promyelocytes #   


 


Blast Cells #   


 


WBC Morphology   


 


Hypersegmented Neuts   


 


Hyposegmented Neuts   


 


Hypogranular Neuts   


 


Smudge Cells   


 


Toxic Granulation   


 


Toxic Vacuolation   


 


Dohle Bodies   


 


Pelger-Huet Anomaly   


 


Chani Rods   


 


Platelet Estimate   


 


Clumped Platelets   


 


Plt Clumps, EDTA   


 


Large Platelets   


 


Giant Platelets   


 


Platelet Satelliting   


 


Plt Morphology Comment   


 


RBC Morphology   


 


Dimorphic RBCs   


 


Polychromasia   


 


Hypochromasia   


 


Poikilocytosis   


 


Anisocytosis   


 


Microcytosis   


 


Macrocytosis   


 


Spherocytes   


 


Pappenheimer Bodies   


 


Sickle Cells   


 


Target Cells   


 


Tear Drop Cells   


 


Ovalocytes   


 


Helmet Cells   


 


Maloney-Lawnton Bodies   


 


Cabot Rings   


 


Radha Cells   


 


Bite Cells   


 


Crenated Cell   


 


Elliptocytes   


 


Acanthocytes (Spur)   


 


Rouleaux   


 


Hemoglobin C Crystals   


 


Schistocytes   


 


Malaria parasites   


 


Hugo Bodies   


 


Hem Pathologist Commnt   


 


APTT   


 


Sodium   


 


Potassium   


 


Chloride   


 


Carbon Dioxide   


 


Anion Gap   


 


BUN   


 


Creatinine   


 


Estimated GFR   


 


BUN/Creatinine Ratio   


 


Glucose   


 


POC Glucose   235 H 


 


Lactic Acid    0.90


 


Calcium   


 


Magnesium   


 


Total Bilirubin   


 


AST   


 


ALT   


 


Alkaline Phosphatase   


 


Total Creatine Kinase   


 


Troponin T   


 


Total Protein   


 


Albumin   


 


Albumin/Globulin Ratio   


 


Triglycerides   


 


Cholesterol   


 


LDL Cholesterol Direct   


 


HDL Cholesterol   


 


Cholesterol/HDL Ratio   


 


TSH  7.530 H  














  18





  02:51 06:53 06:59


 


WBC   


 


RBC   


 


Hgb   


 


Hct   


 


MCV   


 


MCH   


 


MCHC   


 


RDW   


 


Plt Count   


 


Add Manual Diff   


 


Total Counted   


 


Seg Neuts % (Manual)   


 


Band Neutrophils %   


 


Lymphocytes % (Manual)   


 


Reactive Lymphs % (Man)   


 


Monocytes % (Manual)   


 


Eosinophils % (Manual)   


 


Basophils % (Manual)   


 


Metamyelocytes %   


 


Myelocytes %   


 


Promyelocytes %   


 


Blast Cells %   


 


Nucleated RBC %   


 


Seg Neutrophils # Man   


 


Band Neutrophils #   


 


Lymphocytes # (Manual)   


 


Abs React Lymphs (Man)   


 


Monocytes # (Manual)   


 


Eosinophils # (Manual)   


 


Basophils # (Manual)   


 


Metamyelocytes #   


 


Myelocytes #   


 


Promyelocytes #   


 


Blast Cells #   


 


WBC Morphology   


 


Hypersegmented Neuts   


 


Hyposegmented Neuts   


 


Hypogranular Neuts   


 


Smudge Cells   


 


Toxic Granulation   


 


Toxic Vacuolation   


 


Dohle Bodies   


 


Pelger-Huet Anomaly   


 


Chani Rods   


 


Platelet Estimate   


 


Clumped Platelets   


 


Plt Clumps, EDTA   


 


Large Platelets   


 


Giant Platelets   


 


Platelet Satelliting   


 


Plt Morphology Comment   


 


RBC Morphology   


 


Dimorphic RBCs   


 


Polychromasia   


 


Hypochromasia   


 


Poikilocytosis   


 


Anisocytosis   


 


Microcytosis   


 


Macrocytosis   


 


Spherocytes   


 


Pappenheimer Bodies   


 


Sickle Cells   


 


Target Cells   


 


Tear Drop Cells   


 


Ovalocytes   


 


Helmet Cells   


 


Maloney-Lawnton Bodies   


 


Cabot Rings   


 


Catawba Cells   


 


Bite Cells   


 


Crenated Cell   


 


Elliptocytes   


 


Acanthocytes (Spur)   


 


Rouleaux   


 


Hemoglobin C Crystals   


 


Schistocytes   


 


Malaria parasites   


 


Hugo Bodies   


 


Hem Pathologist Commnt   


 


APTT   


 


Sodium  129 L  


 


Potassium  4.4  


 


Chloride  95.0 L  


 


Carbon Dioxide  17 L  


 


Anion Gap  21  


 


BUN  57 H  


 


Creatinine  4.8 H  


 


Estimated GFR  9  


 


BUN/Creatinine Ratio  12  


 


Glucose  131 H   228 H


 


POC Glucose   < 40 L 


 


Lactic Acid   


 


Calcium  7.4 L  


 


Magnesium   


 


Total Bilirubin   


 


AST   


 


ALT   


 


Alkaline Phosphatase   


 


Total Creatine Kinase  97  


 


Troponin T   


 


Total Protein   


 


Albumin   


 


Albumin/Globulin Ratio   


 


Triglycerides   


 


Cholesterol   


 


LDL Cholesterol Direct   


 


HDL Cholesterol   


 


Cholesterol/HDL Ratio   


 


TSH   














  18





  07:33


 


WBC 


 


RBC 


 


Hgb 


 


Hct 


 


MCV 


 


MCH 


 


MCHC 


 


RDW 


 


Plt Count 


 


Add Manual Diff 


 


Total Counted 


 


Seg Neuts % (Manual) 


 


Band Neutrophils % 


 


Lymphocytes % (Manual) 


 


Reactive Lymphs % (Man) 


 


Monocytes % (Manual) 


 


Eosinophils % (Manual) 


 


Basophils % (Manual) 


 


Metamyelocytes % 


 


Myelocytes % 


 


Promyelocytes % 


 


Blast Cells % 


 


Nucleated RBC % 


 


Seg Neutrophils # Man 


 


Band Neutrophils # 


 


Lymphocytes # (Manual) 


 


Abs React Lymphs (Man) 


 


Monocytes # (Manual) 


 


Eosinophils # (Manual) 


 


Basophils # (Manual) 


 


Metamyelocytes # 


 


Myelocytes # 


 


Promyelocytes # 


 


Blast Cells # 


 


WBC Morphology 


 


Hypersegmented Neuts 


 


Hyposegmented Neuts 


 


Hypogranular Neuts 


 


Smudge Cells 


 


Toxic Granulation 


 


Toxic Vacuolation 


 


Dohle Bodies 


 


Pelger-Huet Anomaly 


 


Chani Rods 


 


Platelet Estimate 


 


Clumped Platelets 


 


Plt Clumps, EDTA 


 


Large Platelets 


 


Giant Platelets 


 


Platelet Satelliting 


 


Plt Morphology Comment 


 


RBC Morphology 


 


Dimorphic RBCs 


 


Polychromasia 


 


Hypochromasia 


 


Poikilocytosis 


 


Anisocytosis 


 


Microcytosis 


 


Macrocytosis 


 


Spherocytes 


 


Pappenheimer Bodies 


 


Sickle Cells 


 


Target Cells 


 


Tear Drop Cells 


 


Ovalocytes 


 


Helmet Cells 


 


Maloney-Lawnton Bodies 


 


Cabot Rings 


 


Catawba Cells 


 


Bite Cells 


 


Crenated Cell 


 


Elliptocytes 


 


Acanthocytes (Spur) 


 


Rouleaux 


 


Hemoglobin C Crystals 


 


Schistocytes 


 


Malaria parasites 


 


Hugo Bodies 


 


Hem Pathologist Commnt 


 


APTT 


 


Sodium 


 


Potassium 


 


Chloride 


 


Carbon Dioxide 


 


Anion Gap 


 


BUN 


 


Creatinine 


 


Estimated GFR 


 


BUN/Creatinine Ratio 


 


Glucose 


 


POC Glucose  184 H


 


Lactic Acid 


 


Calcium 


 


Magnesium 


 


Total Bilirubin 


 


AST 


 


ALT 


 


Alkaline Phosphatase 


 


Total Creatine Kinase 


 


Troponin T 


 


Total Protein 


 


Albumin 


 


Albumin/Globulin Ratio 


 


Triglycerides 


 


Cholesterol 


 


LDL Cholesterol Direct 


 


HDL Cholesterol 


 


Cholesterol/HDL Ratio 


 


TSH 














Assessment and Plan


1.abd pain


 2.diarrhea


 3.abnormal CT


-afebrile


-WBC 18.7


-C-diff pending


-abd CT showed concentric wall thickening involving the proximal sigmoid colon (

consistent with C-diff colitis?)


-etiology unclear-possibly infectious vs other


-will order stool studies to r/o infection


-if negative, consider colonoscopy for further evaluation once patient is 

medically stable


-start on flagyl, recommend d/c of Zosyn if no other source of infection is 

found on urine, blood cultures, etc.


-electrolyte management per primary team


-continue supportive care


-will follow





4.sepsis


5.ARF


6.A-fib








<ABEBA GUZMAN - Last Filed: 18 12:05>





Medications and Allergies


Active Meds: 


Active Medications





Acetaminophen (Tylenol)  650 mg PO Q4H PRN


   PRN Reason: Pain MILD(1-3)/Fever >100.5/HA


Albuterol (Proventil)  2.5 mg IH Q4HRT PRN


   PRN Reason: Shortness Of Breath


Albuterol/Ipratropium (Duoneb *Not For Prn Use*)  1 ampul IH TIDRT [facility]


   Last Admin: 08/15/18 21:09 Dose:  Not Given


Aspirin (Baby Aspirin)  81 mg PO QHS [facility]


   Last Admin: 18 00:30 Dose:  81 mg


Atorvastatin Calcium (Lipitor)  40 mg PO QHS [facility]


   Last Admin: 18 00:31 Dose:  40 mg


Calcium/Vitamin D (Oysco D 500 Mg-200 Unit)  1 each PO BID [facility]


   Last Admin: 18 10:16 Dose:  1 each


Dextrose (D50w (25gm) Syringe)  50 ml IV PRN PRN


   PRN Reason: Hypoglycemia


Ezetimibe (Zetia)  10 mg PO QDAY [facility]


   Last Admin: 18 10:34 Dose:  Not Given


Famotidine (Pepcid)  20 mg PO BID [facility]


   Last Admin: 18 10:17 Dose:  20 mg


Guaifenesin (Mucinex Er)  600 mg PO BID [facility]


   Last Admin: 18 10:16 Dose:  600 mg


Piperacillin Sod/Tazobactam Sod (Zosyn/Ns 2.25 Gm/50ml)  2.25 gm in 50 mls @ 

100 mls/hr IV Q8HR [facility]


   Last Admin: 18 05:57 Dose:  100 mls/hr


Sodium Chloride (Nacl 0.9% 1000 Ml)  1,000 mls @ 75 mls/hr IV AS DIRECT [facility]


   Last Admin: 18 10:19 Dose:  75 mls/hr


Metronidazole (Flagyl 500 Mg/100 Ml)  500 mg in 100 mls @ 100 mls/hr IV Q8HR [facility]

; Protocol


Insulin Human Lispro (Humalog)  0 unit SUB-Q ACHS [facility]; Protocol


   Last Admin: 18 11:47 Dose:  3 unit


Levothyroxine Sodium (Synthroid)  50 mcg PO QAM@0600 [facility]


   Last Admin: 18 05:57 Dose:  50 mcg


Loratadine (Claritin)  10 mg PO DAILY [facility]


   Last Admin: 18 10:17 Dose:  10 mg


Memantine (Namenda)  5 mg PO BID [facility]


   Last Admin: 18 10:17 Dose:  5 mg


Miscellaneous Medication (Formoterol Fumarate [Foradil])  12 mg PO BID [facility]


Miscellaneous Medication (Saccharomyces Boulardii (Nf))  250 mg PO Q12HR [facility]


Miscellaneous Medication (Xiidra)  1 drop OD BID [facility]


Multivitamins (Theragran Tab)  1 each PO DAILY [facility]


   Last Admin: 18 10:17 Dose:  1 each


Ondansetron HCl (Zofran Odt)  4 mg PO Q8HR PRN


   PRN Reason: Nausea And Vomiting


Oxycodone/Acetaminophen (Percocet 5/325)  1 tab PO Q6HR PRN


   PRN Reason: Pain (4-6)


   Last Admin: 08/15/18 21:55 Dose:  1 tab


Sodium Chloride (Sodium Chloride Flush Syringe 10 Ml)  10 ml IV BID [facility]


   Last Admin: 18 10:34 Dose:  10 ml


Sodium Chloride (Sodium Chloride Flush Syringe 10 Ml)  10 ml IV PRN PRN


   PRN Reason: LINE FLUSH


Tramadol HCl (Ultram)  50 mg PO Q6H PRN


   PRN Reason: Pain, Moderate (4-6)











Exam





- Constitutional


Vital Signs: 


 











Temp Pulse Resp BP Pulse Ox


 


 97.9 F   74   18   92/35   100 


 


 18 08:23  18 08:23  18 08:23  18 08:23  18 08:23














- Labs


CBC & Chem 7: 


 08/15/18 15:05





 18 06:59


Lab Results: 


 Laboratory Results - last 24 hr











  08/15/18 08/15/18 08/15/18





  14:01 15:05 15:05


 


WBC   18.7 H 


 


RBC   3.52 L 


 


Hgb   10.4 


 


Hct   31.1 


 


MCV   88 


 


MCH   30 


 


MCHC   33 


 


RDW   15.6 H 


 


Plt Count   197 


 


Add Manual Diff   Complete 


 


Total Counted   100 


 


Seg Neuts % (Manual)   87.0 H 


 


Band Neutrophils %   0 


 


Lymphocytes % (Manual)   9.0 L 


 


Reactive Lymphs % (Man)   0 


 


Monocytes % (Manual)   4.0 


 


Eosinophils % (Manual)   0 


 


Basophils % (Manual)   0 


 


Metamyelocytes %   0 


 


Myelocytes %   0 


 


Promyelocytes %   0 


 


Blast Cells %   0 


 


Nucleated RBC %   Not Reportable 


 


Seg Neutrophils # Man   16.3 H 


 


Band Neutrophils #   0.0 


 


Lymphocytes # (Manual)   1.7 


 


Abs React Lymphs (Man)   0.0 


 


Monocytes # (Manual)   0.7 


 


Eosinophils # (Manual)   0.0 


 


Basophils # (Manual)   0.0 


 


Metamyelocytes #   0.0 


 


Myelocytes #   0.0 


 


Promyelocytes #   0.0 


 


Blast Cells #   0.0 


 


WBC Morphology   Not Reportable 


 


Hypersegmented Neuts   Not Reportable 


 


Hyposegmented Neuts   Not Reportable 


 


Hypogranular Neuts   Not Reportable 


 


Smudge Cells   Not Reportable 


 


Toxic Granulation   Not Reportable 


 


Toxic Vacuolation   Not Reportable 


 


Dohle Bodies   Not Reportable 


 


Pelger-Huet Anomaly   Not Reportable 


 


Chani Rods   Not Reportable 


 


Platelet Estimate   Consistent w auto 


 


Clumped Platelets   Not Reportable 


 


Plt Clumps, EDTA   Not Reportable 


 


Large Platelets   Not Reportable 


 


Giant Platelets   Not Reportable 


 


Platelet Satelliting   Not Reportable 


 


Plt Morphology Comment   Not Reportable 


 


RBC Morphology   Not Reportable 


 


Dimorphic RBCs   Not Reportable 


 


Polychromasia   Not Reportable 


 


Hypochromasia   Not Reportable 


 


Poikilocytosis   Few 


 


Anisocytosis   Few 


 


Microcytosis   Not Reportable 


 


Macrocytosis   Not Reportable 


 


Spherocytes   Not Reportable 


 


Pappenheimer Bodies   Not Reportable 


 


Sickle Cells   Not Reportable 


 


Target Cells   Not Reportable 


 


Tear Drop Cells   Not Reportable 


 


Ovalocytes   Not Reportable 


 


Helmet Cells   Not Reportable 


 


Maloney-Lawnton Bodies   Not Reportable 


 


Cabot Rings   Not Reportable 


 


Radha Cells   Not Reportable 


 


Bite Cells   Not Reportable 


 


Crenated Cell   Not Reportable 


 


Elliptocytes   Not Reportable 


 


Acanthocytes (Spur)   Not Reportable 


 


Rouleaux   Not Reportable 


 


Hemoglobin C Crystals   Not Reportable 


 


Schistocytes   Not Reportable 


 


Malaria parasites   Not Reportable 


 


Hugo Bodies   Not Reportable 


 


Hem Pathologist Commnt   No 


 


APTT    24.4


 


Sodium   


 


Potassium   


 


Chloride   


 


Carbon Dioxide   


 


Anion Gap   


 


BUN   


 


Creatinine   


 


Estimated GFR   


 


BUN/Creatinine Ratio   


 


Glucose   


 


POC Glucose  153 H  


 


Lactic Acid   


 


Calcium   


 


Magnesium   


 


Total Bilirubin   


 


AST   


 


ALT   


 


Alkaline Phosphatase   


 


Total Creatine Kinase   


 


Troponin T   


 


Total Protein   


 


Albumin   


 


Albumin/Globulin Ratio   


 


Triglycerides   


 


Cholesterol   


 


LDL Cholesterol Direct   


 


HDL Cholesterol   


 


Cholesterol/HDL Ratio   


 


TSH   














  08/15/18 08/15/18 08/15/18





  15:05 15:05 15:05


 


WBC   


 


RBC   


 


Hgb   


 


Hct   


 


MCV   


 


MCH   


 


MCHC   


 


RDW   


 


Plt Count   


 


Add Manual Diff   


 


Total Counted   


 


Seg Neuts % (Manual)   


 


Band Neutrophils %   


 


Lymphocytes % (Manual)   


 


Reactive Lymphs % (Man)   


 


Monocytes % (Manual)   


 


Eosinophils % (Manual)   


 


Basophils % (Manual)   


 


Metamyelocytes %   


 


Myelocytes %   


 


Promyelocytes %   


 


Blast Cells %   


 


Nucleated RBC %   


 


Seg Neutrophils # Man   


 


Band Neutrophils #   


 


Lymphocytes # (Manual)   


 


Abs React Lymphs (Man)   


 


Monocytes # (Manual)   


 


Eosinophils # (Manual)   


 


Basophils # (Manual)   


 


Metamyelocytes #   


 


Myelocytes #   


 


Promyelocytes #   


 


Blast Cells #   


 


WBC Morphology   


 


Hypersegmented Neuts   


 


Hyposegmented Neuts   


 


Hypogranular Neuts   


 


Smudge Cells   


 


Toxic Granulation   


 


Toxic Vacuolation   


 


Dohle Bodies   


 


Pelger-Huet Anomaly   


 


Chani Rods   


 


Platelet Estimate   


 


Clumped Platelets   


 


Plt Clumps, EDTA   


 


Large Platelets   


 


Giant Platelets   


 


Platelet Satelliting   


 


Plt Morphology Comment   


 


RBC Morphology   


 


Dimorphic RBCs   


 


Polychromasia   


 


Hypochromasia   


 


Poikilocytosis   


 


Anisocytosis   


 


Microcytosis   


 


Macrocytosis   


 


Spherocytes   


 


Pappenheimer Bodies   


 


Sickle Cells   


 


Target Cells   


 


Tear Drop Cells   


 


Ovalocytes   


 


Helmet Cells   


 


Maloney-Lawnton Bodies   


 


Cabot Rings   


 


Radha Cells   


 


Bite Cells   


 


Crenated Cell   


 


Elliptocytes   


 


Acanthocytes (Spur)   


 


Rouleaux   


 


Hemoglobin C Crystals   


 


Schistocytes   


 


Malaria parasites   


 


Hugo Bodies   


 


Hem Pathologist Commnt   


 


APTT   


 


Sodium  128 L  


 


Potassium  4.6  


 


Chloride  89.6 L  


 


Carbon Dioxide  20 L  


 


Anion Gap  23  


 


BUN  54 H  


 


Creatinine  4.9 H  


 


Estimated GFR  9  


 


BUN/Creatinine Ratio  11  


 


Glucose  121 H  


 


POC Glucose   


 


Lactic Acid   1.00 


 


Calcium  8.4  


 


Magnesium    2.30


 


Total Bilirubin  0.30  


 


AST  33  


 


ALT  11  


 


Alkaline Phosphatase  97  


 


Total Creatine Kinase  387 H  


 


Troponin T  0.053 H  


 


Total Protein  6.3  


 


Albumin  2.8 L  


 


Albumin/Globulin Ratio  0.8  


 


Triglycerides  135  


 


Cholesterol  96  


 


LDL Cholesterol Direct  27 L  


 


HDL Cholesterol  32 L  


 


Cholesterol/HDL Ratio  3.00  


 


TSH   














  08/15/18 08/15/18 08/16/18





  15:05 21:45 00:40


 


WBC   


 


RBC   


 


Hgb   


 


Hct   


 


MCV   


 


MCH   


 


MCHC   


 


RDW   


 


Plt Count   


 


Add Manual Diff   


 


Total Counted   


 


Seg Neuts % (Manual)   


 


Band Neutrophils %   


 


Lymphocytes % (Manual)   


 


Reactive Lymphs % (Man)   


 


Monocytes % (Manual)   


 


Eosinophils % (Manual)   


 


Basophils % (Manual)   


 


Metamyelocytes %   


 


Myelocytes %   


 


Promyelocytes %   


 


Blast Cells %   


 


Nucleated RBC %   


 


Seg Neutrophils # Man   


 


Band Neutrophils #   


 


Lymphocytes # (Manual)   


 


Abs React Lymphs (Man)   


 


Monocytes # (Manual)   


 


Eosinophils # (Manual)   


 


Basophils # (Manual)   


 


Metamyelocytes #   


 


Myelocytes #   


 


Promyelocytes #   


 


Blast Cells #   


 


WBC Morphology   


 


Hypersegmented Neuts   


 


Hyposegmented Neuts   


 


Hypogranular Neuts   


 


Smudge Cells   


 


Toxic Granulation   


 


Toxic Vacuolation   


 


Dohle Bodies   


 


Pelger-Huet Anomaly   


 


Chani Rods   


 


Platelet Estimate   


 


Clumped Platelets   


 


Plt Clumps, EDTA   


 


Large Platelets   


 


Giant Platelets   


 


Platelet Satelliting   


 


Plt Morphology Comment   


 


RBC Morphology   


 


Dimorphic RBCs   


 


Polychromasia   


 


Hypochromasia   


 


Poikilocytosis   


 


Anisocytosis   


 


Microcytosis   


 


Macrocytosis   


 


Spherocytes   


 


Pappenheimer Bodies   


 


Sickle Cells   


 


Target Cells   


 


Tear Drop Cells   


 


Ovalocytes   


 


Helmet Cells   


 


Maloney-Lawnton Bodies   


 


Cabot Rings   


 


Radha Cells   


 


Bite Cells   


 


Crenated Cell   


 


Elliptocytes   


 


Acanthocytes (Spur)   


 


Rouleaux   


 


Hemoglobin C Crystals   


 


Schistocytes   


 


Malaria parasites   


 


Hugo Bodies   


 


Hem Pathologist Commnt   


 


APTT   


 


Sodium   


 


Potassium   


 


Chloride   


 


Carbon Dioxide   


 


Anion Gap   


 


BUN   


 


Creatinine   


 


Estimated GFR   


 


BUN/Creatinine Ratio   


 


Glucose   


 


POC Glucose   235 H 


 


Lactic Acid    0.90


 


Calcium   


 


Magnesium   


 


Total Bilirubin   


 


AST   


 


ALT   


 


Alkaline Phosphatase   


 


Total Creatine Kinase   


 


Troponin T   


 


Total Protein   


 


Albumin   


 


Albumin/Globulin Ratio   


 


Triglycerides   


 


Cholesterol   


 


LDL Cholesterol Direct   


 


HDL Cholesterol   


 


Cholesterol/HDL Ratio   


 


TSH  7.530 H  














  18





  02:51 06:53 06:59


 


WBC   


 


RBC   


 


Hgb   


 


Hct   


 


MCV   


 


MCH   


 


MCHC   


 


RDW   


 


Plt Count   


 


Add Manual Diff   


 


Total Counted   


 


Seg Neuts % (Manual)   


 


Band Neutrophils %   


 


Lymphocytes % (Manual)   


 


Reactive Lymphs % (Man)   


 


Monocytes % (Manual)   


 


Eosinophils % (Manual)   


 


Basophils % (Manual)   


 


Metamyelocytes %   


 


Myelocytes %   


 


Promyelocytes %   


 


Blast Cells %   


 


Nucleated RBC %   


 


Seg Neutrophils # Man   


 


Band Neutrophils #   


 


Lymphocytes # (Manual)   


 


Abs React Lymphs (Man)   


 


Monocytes # (Manual)   


 


Eosinophils # (Manual)   


 


Basophils # (Manual)   


 


Metamyelocytes #   


 


Myelocytes #   


 


Promyelocytes #   


 


Blast Cells #   


 


WBC Morphology   


 


Hypersegmented Neuts   


 


Hyposegmented Neuts   


 


Hypogranular Neuts   


 


Smudge Cells   


 


Toxic Granulation   


 


Toxic Vacuolation   


 


Dohle Bodies   


 


Pelger-Huet Anomaly   


 


Chani Rods   


 


Platelet Estimate   


 


Clumped Platelets   


 


Plt Clumps, EDTA   


 


Large Platelets   


 


Giant Platelets   


 


Platelet Satelliting   


 


Plt Morphology Comment   


 


RBC Morphology   


 


Dimorphic RBCs   


 


Polychromasia   


 


Hypochromasia   


 


Poikilocytosis   


 


Anisocytosis   


 


Microcytosis   


 


Macrocytosis   


 


Spherocytes   


 


Pappenheimer Bodies   


 


Sickle Cells   


 


Target Cells   


 


Tear Drop Cells   


 


Ovalocytes   


 


Helmet Cells   


 


Maloney-Lawnton Bodies   


 


Cabot Rings   


 


Catawba Cells   


 


Bite Cells   


 


Crenated Cell   


 


Elliptocytes   


 


Acanthocytes (Spur)   


 


Rouleaux   


 


Hemoglobin C Crystals   


 


Schistocytes   


 


Malaria parasites   


 


Hugo Bodies   


 


Hem Pathologist Commnt   


 


APTT   


 


Sodium  129 L  


 


Potassium  4.4  


 


Chloride  95.0 L  


 


Carbon Dioxide  17 L  


 


Anion Gap  21  


 


BUN  57 H  


 


Creatinine  4.8 H  


 


Estimated GFR  9  


 


BUN/Creatinine Ratio  12  


 


Glucose  131 H   228 H


 


POC Glucose   < 40 L 


 


Lactic Acid   


 


Calcium  7.4 L  


 


Magnesium   


 


Total Bilirubin   


 


AST   


 


ALT   


 


Alkaline Phosphatase   


 


Total Creatine Kinase  97  


 


Troponin T   


 


Total Protein   


 


Albumin   


 


Albumin/Globulin Ratio   


 


Triglycerides   


 


Cholesterol   


 


LDL Cholesterol Direct   


 


HDL Cholesterol   


 


Cholesterol/HDL Ratio   


 


TSH   














  18





  07:33


 


WBC 


 


RBC 


 


Hgb 


 


Hct 


 


MCV 


 


MCH 


 


MCHC 


 


RDW 


 


Plt Count 


 


Add Manual Diff 


 


Total Counted 


 


Seg Neuts % (Manual) 


 


Band Neutrophils % 


 


Lymphocytes % (Manual) 


 


Reactive Lymphs % (Man) 


 


Monocytes % (Manual) 


 


Eosinophils % (Manual) 


 


Basophils % (Manual) 


 


Metamyelocytes % 


 


Myelocytes % 


 


Promyelocytes % 


 


Blast Cells % 


 


Nucleated RBC % 


 


Seg Neutrophils # Man 


 


Band Neutrophils # 


 


Lymphocytes # (Manual) 


 


Abs React Lymphs (Man) 


 


Monocytes # (Manual) 


 


Eosinophils # (Manual) 


 


Basophils # (Manual) 


 


Metamyelocytes # 


 


Myelocytes # 


 


Promyelocytes # 


 


Blast Cells # 


 


WBC Morphology 


 


Hypersegmented Neuts 


 


Hyposegmented Neuts 


 


Hypogranular Neuts 


 


Smudge Cells 


 


Toxic Granulation 


 


Toxic Vacuolation 


 


Dohle Bodies 


 


Pelger-Huet Anomaly 


 


Chani Rods 


 


Platelet Estimate 


 


Clumped Platelets 


 


Plt Clumps, EDTA 


 


Large Platelets 


 


Giant Platelets 


 


Platelet Satelliting 


 


Plt Morphology Comment 


 


RBC Morphology 


 


Dimorphic RBCs 


 


Polychromasia 


 


Hypochromasia 


 


Poikilocytosis 


 


Anisocytosis 


 


Microcytosis 


 


Macrocytosis 


 


Spherocytes 


 


Pappenheimer Bodies 


 


Sickle Cells 


 


Target Cells 


 


Tear Drop Cells 


 


Ovalocytes 


 


Helmet Cells 


 


Maloney-Lawnton Bodies 


 


Cabot Rings 


 


Radha Cells 


 


Bite Cells 


 


Crenated Cell 


 


Elliptocytes 


 


Acanthocytes (Spur) 


 


Rouleaux 


 


Hemoglobin C Crystals 


 


Schistocytes 


 


Malaria parasites 


 


Hugo Bodies 


 


Hem Pathologist Commnt 


 


APTT 


 


Sodium 


 


Potassium 


 


Chloride 


 


Carbon Dioxide 


 


Anion Gap 


 


BUN 


 


Creatinine 


 


Estimated GFR 


 


BUN/Creatinine Ratio 


 


Glucose 


 


POC Glucose  184 H


 


Lactic Acid 


 


Calcium 


 


Magnesium 


 


Total Bilirubin 


 


AST 


 


ALT 


 


Alkaline Phosphatase 


 


Total Creatine Kinase 


 


Troponin T 


 


Total Protein 


 


Albumin 


 


Albumin/Globulin Ratio 


 


Triglycerides 


 


Cholesterol 


 


LDL Cholesterol Direct 


 


HDL Cholesterol 


 


Cholesterol/HDL Ratio 


 


TSH 














Assessment and Plan





- Patient Problems


(1) Abdominal pain


Current Visit: Yes   Status: Acute   


Qualifiers: 


   Abdominal location: unspecified location   Qualified Code(s): R10.9 - 

Unspecified abdominal pain   


Plan to address problem: 


The patient was personally seen and examined.  History was obtained from 

 due to her advanced dementia and non verbal status.  Diverticulitis is 

a good probability with the symptoms, exam and CT finding with a thickened 

sigmoid colon, however cancer cannot be excluded as d/w . She is 

probably not a candidate for colonoscopy.  Stools for C. diff have been 

requested and patient is on broad spectrum antibiotic coverage. Will follow 

closely

## 2018-08-16 NOTE — CONSULTATION
History of Present Illness


Consult date: 18


Requesting physician: AMY J KOCHERLA


Consult reason: atrial fibrillation


History of present illness: 


This is an 83-year-old female brought in her family history is obtained by the 

daughter who is at the bedside known patient to my practice has history of 

coronary disease peripheral vascular disease hypertension chronic renal 

sufficiency patient also has chronic back issues with back injections patient 

was in the hospital 2 weeks ago for chest pain after back injections at a 

negative stress test was sent home as per her daughter has not been feeling 

well last 4 days with diarrhea and abdominal pain and is having worsening 

mental status was brought to the emergency room and found to have acute renal 

failure and was put and sepsis protocols on broad-spectrum antibiotics this 

a.m. patient still has altered mental status with abdominal tenderness with 

negative CAT scan of the abdominal done in the ER.  Patient's also had a 

history of new onset brief episode of atrial fibrillation in the ED with sepsis 

has resolved on telemetry monitor frequent APCs were noted








Past History


Past Medical History: CAD, COPD, diabetes, hypertension, PVD


Past Surgical History: appendectomy, CABG, , Other (Insulin pump, 

aortofemoral bypass. axillary)


Social history: , lives with family.  denies: smoking, alcohol abuse, 

prescription drug abuse


Family history: diabetes, hypertension





Medications and Allergies


 Allergies











Allergy/AdvReac Type Severity Reaction Status Date / Time


 


hydromorphone HCl Allergy Severe Rash Verified 14 05:48





[From Dilaudid]     


 


codeine AdvReac  Swelling Verified 14 13:32


 


hydroxyprogesterone caproate AdvReac  Seizure Verified 14 05:48





[From Delalutin]     











 Home Medications











 Medication  Instructions  Recorded  Confirmed  Last Taken  Type


 


Aspirin [Aspirin BABY CHEW TAB] 81 mg PO BID 05/28/14 08/15/18 08/14/18 History


 


Calcium Carbonate/Vitamin D3 1 mg PO BID 05/28/14 08/15/18 08/14/18 History





[Calcium 600-Vit D3 400 Tablet]     


 


Cetirizine HCl 10 mg PO QDAY 05/28/14 08/15/18 08/14/18 History


 


Multivitamin [Multi-Vitamin Daily] 1 tab PO QDAY 05/28/14 08/15/18 08/14/18 

History


 


Ramipril 10 mg PO BID 05/28/14 08/15/18 08/14/18 History


 


Rosuvastatin Calcium [Crestor] 20 mg PO QHS 05/28/14 08/15/18 08/14/18 History


 


Saccharomyces Boulardii (Nf) 250 mg PO Q12HR #20 capsule 06/05/14 08/15/18 08/14

/18 Rx





[Florastor (Nf)]     


 


Ondansetron [Zofran ODT TAB] 4 mg PO Q8HR PRN #14 tab.rapdis 04/10/18 08/15/18 

08/14/18 Rx


 


Co Q-10 100 mg Softgel 100 mg PO DAILY 08/01/18 08/15/18 08/14/18 History


 


Exelon 1.5 mg PO BID 08/01/18 08/15/18 08/14/18 History


 


Furosemide 20 mg PO 2XW 08/01/18 08/15/18 08/14/18 History


 


Imdur ER 60 mg PO DAILY 08/01/18 08/15/18 08/14/18 History


 


Memantine HCl 5 mg PO BID 08/01/18 08/15/18 08/14/18 History


 


Ranitidine HCl 150 mg PO BID 08/01/18 08/15/18 08/14/18 History


 


Spiriva Respimat 2 puff INHALATION DAILY 08/01/18 08/15/18 08/14/18 History


 


Toprol Xl 25 mg PO DAILY 08/01/18 08/15/18 08/14/18 History


 


Xiidra 1 drop OD BID 08/01/18 08/15/18 08/14/18 History


 


traMADol 50 mg PO Q6HR PRN 08/01/18 08/15/18 08/14/18 History


 


Levothyroxine [Synthroid] 50 mcg PO QAM 08/15/18 08/15/18 08/14/18 History











Active Meds: 


Active Medications





Acetaminophen (Tylenol)  650 mg PO Q4H PRN


   PRN Reason: Pain MILD(1-3)/Fever >100.5/HA


Albuterol (Proventil)  2.5 mg IH Q4HRT PRN


   PRN Reason: Shortness Of Breath


Albuterol/Ipratropium (Duoneb *Not For Prn Use*)  1 ampul IH TIDRT [facility]


   Last Admin: 08/15/18 21:09 Dose:  Not Given


Aspirin (Baby Aspirin)  81 mg PO QHS [facility]


   Last Admin: 18 00:30 Dose:  81 mg


Atorvastatin Calcium (Lipitor)  40 mg PO QHS [facility]


   Last Admin: 18 00:31 Dose:  40 mg


Calcium/Vitamin D (Oysco D 500 Mg-200 Unit)  1 each PO BID [facility]


   Last Admin: 18 00:32 Dose:  1 each


Carvedilol (Coreg)  12.5 mg PO BID [facility]


   Last Admin: 18 00:31 Dose:  12.5 mg


Dextrose (D50w (25gm) Syringe)  50 ml IV PRN PRN


   PRN Reason: Hypoglycemia


Ezetimibe (Zetia)  10 mg PO QDAY [facility]


Famotidine (Pepcid)  20 mg PO BID [facility]


   Last Admin: 18 00:32 Dose:  20 mg


Guaifenesin (Mucinex Er)  600 mg PO BID [facility]


   Last Admin: 18 00:31 Dose:  600 mg


Piperacillin Sod/Tazobactam Sod (Zosyn/Ns 2.25 Gm/50ml)  2.25 gm in 50 mls @ 

100 mls/hr IV Q8HR [facility]


   Last Admin: 18 05:57 Dose:  100 mls/hr


Sodium Chloride (Nacl 0.9% 1000 Ml)  1,000 mls @ 75 mls/hr IV AS DIRECT [facility]


Insulin Human Lispro (Humalog)  10 unit SUB-Q TIDAC [facility]


   Last Admin: 18 08:41 Dose:  Not Given


Isosorbide Mononitrate (Imdur)  60 mg PO QDAY [facility]


Levothyroxine Sodium (Synthroid)  50 mcg PO QAM@0600 [facility]


   Last Admin: 18 05:57 Dose:  50 mcg


Loratadine (Claritin)  10 mg PO DAILY [facility]


Memantine (Namenda)  5 mg PO BID [facility]


   Last Admin: 18 00:31 Dose:  5 mg


Miscellaneous Medication (Formoterol Fumarate [Foradil])  12 mg PO BID [facility]


Miscellaneous Medication (Saccharomyces Boulardii (Nf))  250 mg PO Q12HR [facility]


Miscellaneous Medication (Xiidra)  1 drop OD BID [facility]


Multivitamins (Theragran Tab)  1 each PO DAILY [facility]


Ondansetron HCl (Zofran Odt)  4 mg PO Q8HR PRN


   PRN Reason: Nausea And Vomiting


Oxycodone/Acetaminophen (Percocet 5/325)  1 tab PO Q6HR PRN


   PRN Reason: Pain (4-6)


   Last Admin: 08/15/18 21:55 Dose:  1 tab


Sodium Chloride (Sodium Chloride Flush Syringe 10 Ml)  10 ml IV BID SANCHEZ


   Last Admin: 18 00:32 Dose:  10 ml


Sodium Chloride (Sodium Chloride Flush Syringe 10 Ml)  10 ml IV PRN PRN


   PRN Reason: LINE FLUSH


Tramadol HCl (Ultram)  50 mg PO Q6H PRN


   PRN Reason: Pain, Moderate (4-6)











Review of Systems


ROS unobtainable: due to mental status


All systems: negative





Physical Examination


 Vital Signs











Temp Pulse BP Pulse Ox


 


 98.0 F   118 H  167/61   93 


 


 08/15/18 13:49  08/15/18 13:49  08/15/18 13:49  08/15/18 13:49











General appearance: cachectic


HEENT: Positive: EOMI


Neck: Positive: neck supple


Cardiac: Positive: Irregularly Regular


Lungs: Positive: clear to auscultation


Neuro: Positive: Other


Abdomen: Positive: Tender


Female genitourinary: deferred


Skin: Positive: Clear


Extremities: Absent: normal, edema





Results





 08/15/18 15:05





 18 06:59


 Cardiac Enzymes











  08/15/18 Range/Units





  15:05 


 


AST  33  (5-40)  units/L








 Coagulation











  08/15/18 Range/Units





  15:05 


 


APTT  24.4  (24.2-36.6)  Sec.








 Lipids











  08/15/18 Range/Units





  15:05 


 


Triglycerides  135  (2-149)  mg/dL


 


Cholesterol  96  ()  mg/dL


 


HDL Cholesterol  32 L  (40-59)  mg/dL


 


Cholesterol/HDL Ratio  3.00  %








 CBC











  08/15/18 Range/Units





  15:05 


 


WBC  18.7 H  (4.5-11.0)  K/mm3


 


RBC  3.52 L  (3.65-5.03)  M/mm3


 


Hgb  10.4  (10.1-14.3)  gm/dl


 


Hct  31.1  (30.3-42.9)  %


 


Plt Count  197  (140-440)  K/mm3








 Comprehensive Metabolic Panel











  08/15/18 08/16/18 08/16/18 Range/Units





  15:05 02:51 06:59 


 


Sodium  128 L  129 L   (137-145)  mmol/L


 


Potassium  4.6  4.4   (3.6-5.0)  mmol/L


 


Chloride  89.6 L  95.0 L   ()  mmol/L


 


Carbon Dioxide  20 L  17 L   (22-30)  mmol/L


 


BUN  54 H  57 H   (7-17)  mg/dL


 


Creatinine  4.9 H  4.8 H   (0.7-1.2)  mg/dL


 


Glucose  121 H  131 H  228 H  ()  mg/dL


 


Calcium  8.4  7.4 L   (8.4-10.2)  mg/dL


 


AST  33    (5-40)  units/L


 


ALT  11    (7-56)  units/L


 


Alkaline Phosphatase  97    ()  units/L


 


Total Protein  6.3    (6.3-8.2)  g/dL


 


Albumin  2.8 L    (3.9-5)  g/dL














- Imaging and Cardiology


Stress echo: other (2018 normal lv function no signficant ischemia )


Echo: report reviewed (2018 normal lv function no signficant regurtitations )





EKG interpretations





- Telemetry


EKG Rhythm: Sinus Rhythm (nsr non specific st-t)





- EKG


Sinus rhythms and dysrhythmias: sinus rhythm


Supraventricular dysrhythmia: atrial premature complexe





Assessment and Plan


sepsis


p afib


acute renal failure


htn


cad s/p cabg


ams





rec: cont iv fluids, gi for abd pain, afib is less than 12 hours, hold 

anticougulation and discuss with daughter at bedside.and echo

## 2018-08-17 LAB
ACANTHOCYTES BLD QL SMEAR: (no result)
ALBUMIN SERPL-MCNC: 2 G/DL (ref 3.9–5)
ALT SERPL-CCNC: 10 UNITS/L (ref 7–56)
ANISOCYTOSIS BLD QL SMEAR: (no result)
BAND NEUTROPHILS # (MANUAL): 0 K/MM3
BUN SERPL-MCNC: 63 MG/DL (ref 7–17)
BUN/CREAT SERPL: 11 %
CALCIUM SERPL-MCNC: 7.1 MG/DL (ref 8.4–10.2)
HCT VFR BLD CALC: 27.4 % (ref 30.3–42.9)
HEMOLYSIS INDEX: 9
HGB BLD-MCNC: 8.8 GM/DL (ref 10.1–14.3)
MCH RBC QN AUTO: 29 PG (ref 28–32)
MCHC RBC AUTO-ENTMCNC: 32 % (ref 30–34)
MCV RBC AUTO: 91 FL (ref 79–97)
MYELOCYTES # (MANUAL): 0 K/MM3
PLATELET # BLD: 189 K/MM3 (ref 140–440)
POIKILOCYTOSIS BLD QL SMEAR: (no result)
PROMYELOCYTES # (MANUAL): 0 K/MM3
RBC # BLD AUTO: 3.01 M/MM3 (ref 3.65–5.03)
TOTAL CELLS COUNTED BLD: 100

## 2018-08-17 RX ADMIN — SODIUM CHLORIDE SCH MLS/HR: 0.9 INJECTION, SOLUTION INTRAVENOUS at 22:09

## 2018-08-17 RX ADMIN — IPRATROPIUM BROMIDE AND ALBUTEROL SULFATE SCH AMPUL: .5; 3 SOLUTION RESPIRATORY (INHALATION) at 14:19

## 2018-08-17 RX ADMIN — OXYCODONE AND ACETAMINOPHEN PRN TAB: 5; 325 TABLET ORAL at 01:09

## 2018-08-17 RX ADMIN — IPRATROPIUM BROMIDE AND ALBUTEROL SULFATE SCH AMPUL: .5; 3 SOLUTION RESPIRATORY (INHALATION) at 07:36

## 2018-08-17 RX ADMIN — Medication SCH ML: at 10:07

## 2018-08-17 RX ADMIN — ASPIRIN SCH MG: 81 TABLET, CHEWABLE ORAL at 22:10

## 2018-08-17 RX ADMIN — METRONIDAZOLE SCH MLS/HR: 5 INJECTION, SOLUTION INTRAVENOUS at 14:40

## 2018-08-17 RX ADMIN — OXYCODONE AND ACETAMINOPHEN PRN TAB: 5; 325 TABLET ORAL at 22:11

## 2018-08-17 RX ADMIN — METRONIDAZOLE SCH MLS/HR: 5 INJECTION, SOLUTION INTRAVENOUS at 05:43

## 2018-08-17 RX ADMIN — IPRATROPIUM BROMIDE AND ALBUTEROL SULFATE SCH AMPUL: .5; 3 SOLUTION RESPIRATORY (INHALATION) at 21:46

## 2018-08-17 RX ADMIN — Medication SCH EACH: at 22:10

## 2018-08-17 RX ADMIN — SODIUM CHLORIDE SCH MLS/HR: 0.9 INJECTION, SOLUTION INTRAVENOUS at 00:08

## 2018-08-17 RX ADMIN — GUAIFENESIN SCH MG: 600 TABLET ORAL at 22:10

## 2018-08-17 RX ADMIN — PIPERACILLIN SODIUM AND TAZOBACTAM SODIUM SCH MLS/HR: 2; .25 INJECTION, POWDER, LYOPHILIZED, FOR SOLUTION INTRAVENOUS at 05:43

## 2018-08-17 RX ADMIN — FAMOTIDINE SCH MG: 20 TABLET ORAL at 10:05

## 2018-08-17 RX ADMIN — OXYCODONE AND ACETAMINOPHEN PRN TAB: 5; 325 TABLET ORAL at 07:34

## 2018-08-17 RX ADMIN — Medication SCH ML: at 00:10

## 2018-08-17 RX ADMIN — EZETIMIBE SCH MG: 10 TABLET ORAL at 10:13

## 2018-08-17 RX ADMIN — LEVOTHYROXINE SODIUM SCH MCG: 0.05 TABLET ORAL at 05:43

## 2018-08-17 RX ADMIN — MEMANTINE SCH MG: 5 TABLET ORAL at 09:29

## 2018-08-17 RX ADMIN — GUAIFENESIN SCH MG: 600 TABLET ORAL at 09:29

## 2018-08-17 RX ADMIN — MEMANTINE SCH MG: 5 TABLET ORAL at 22:09

## 2018-08-17 RX ADMIN — Medication SCH ML: at 22:09

## 2018-08-17 RX ADMIN — METRONIDAZOLE SCH MLS/HR: 5 INJECTION, SOLUTION INTRAVENOUS at 22:09

## 2018-08-17 RX ADMIN — LORATADINE SCH MG: 10 TABLET ORAL at 10:05

## 2018-08-17 RX ADMIN — MULTIVITAMIN TABLET SCH EACH: TABLET at 10:08

## 2018-08-17 RX ADMIN — Medication SCH EACH: at 09:30

## 2018-08-17 RX ADMIN — FAMOTIDINE SCH MG: 20 TABLET ORAL at 22:10

## 2018-08-17 NOTE — PROGRESS NOTE
Assessment and Plan





1. Acute kidney injury:


FELICIA superimposed on CKD in the setting of volume depletion and hypotension.


Creatinine continue to increase.


Continue maintenance IV fluids.


CT abdomen was negative for hydronephrosis.


Renal prognosis is guarded.





2. Hypotension:


Monitor.





3. Electrolytes:


Anion gap metabolic acidosis, start on PO Sodium bicarbonate.





4. Sepsis.





5. A.fib.





D/w her  at the bedside.





Subjective


Date of service: 08/17/18


Interval history: 


Patient was seen and examined at the bedside.





Objective





- Vital Signs


Vital signs: 


 Vital Signs - 12hr











  08/16/18 08/16/18 08/17/18





  23:56 23:59 00:49


 


Temperature   98.9 F


 


Pulse Rate 73 65 83


 


Respiratory   18





Rate   


 


Blood Pressure 98/38 108/48 


 


Blood Pressure   101/65





[Left]   


 


O2 Sat by Pulse 92 94 90





Oximetry   














  08/17/18 08/17/18 08/17/18





  01:09 03:00 05:58


 


Temperature   97.8 F


 


Pulse Rate  80 78


 


Respiratory 20  18





Rate   


 


Blood Pressure   


 


Blood Pressure   110/58





[Left]   


 


O2 Sat by Pulse   94





Oximetry   














  08/17/18





  08:17


 


Temperature 97.5 F L


 


Pulse Rate 84


 


Respiratory 16





Rate 


 


Blood Pressure 100/35


 


Blood Pressure 





[Left] 


 


O2 Sat by Pulse 83 L





Oximetry 














- General Appearance


General appearance: well-developed, appears stated age, other (not in distress)


EENT: ATNC, sclerotic cornea (left eye)


Neck: supple


Respiratory: Present: Clear to Ascultation, Other (poor respiratory effort)


Cardiology: S1S2, no murmurs


Gastrointestinal: normoactive bowel sounds, no tenderness


Integumentary: no rash, warm and dry


Neurologic: other (somnolent, able to move extremities)


Musculoskeletal: other (no edema)





- Lab





 08/17/18 06:17





 08/17/18 06:17


 Most recent lab results











Calcium  7.1 mg/dL (8.4-10.2)  L  08/17/18  06:17    


 


Magnesium  2.20 mg/dL (1.7-2.3)   08/17/18  06:17    


 


Urine Creatinine  47.8 mg/dL (0.1-20.0)  H  08/16/18  11:45    


 


Urine Sodium  27 mmol/L  08/16/18  11:45

## 2018-08-17 NOTE — PROGRESS NOTE
Assessment and Plan


Assessment and plan: 


89-year-old very frail and malnourished and cachectic female patient with 

history of coronary artery disease status post CABG 


peripheral vascular disease chronic renal insufficiency chronic back pain, was 

admitted through emergency room with 


altered level of consciousness, poor oral intake and failure to thrive, acute 

on chronic kidney disease, and sepsis by protocol





--Failure to thrive in an adult; 


Patient refuses treat, no appetite, continue supportive care, nutrition 

supplements


If patient continues to have poor oral intake, consider tube feeding





--severe malnutrition: Nutrition supplements, nutrition consult ,supportive care





--Intermittent hypotension; probably hypovolemic, fluid bolus, closely monitor





--Sepsis by criteria; Continue empiric antibiotics, follow cultures, IV fluids 

and supportive care





--Hyponatremia; replacement therapy, mild improvement 





--Acute kidney injury; acute on chronic kidney disease ,secondary to ATN


Gentle hydration, avoid nephrotoxins, nephrology following





--History of coronary artery disease status post CABG; recent negative stress 

test


Continue current cardiac medications, cardiology following





--Atrial fibrillation; continue beta blockers, management per cardiology


No indication for anticoagulation[less than 12 hours of A. fib] now in sinus





--Hypothyroidism; continue Synthroid, thyroid panel reviewed





--Abdominal pain with nausea ;


CT abdomen; thickening of the sigmoid colon, GI advised C. difficile studies


Continue current management, possible colonoscopy in the future as needed





--DVT prophylaxis; SCDs, Lovenox renal dose





--Full CODE STATUS





Consults and recommendations noted and appreciated


Plan of care reviewed with the  at the bedside, and her nurse


CODE STATUS discussed with the ; full code

















History


Interval history: 


Patient seen and examined medical records reviewed


Patient complains of generalized weakness, poor oral intake, no appetite


Intermittent hypotension


No new events reported by the nursing


Patient is chronically ill-looking, cachectic, malnourished


In mild distress


Vital signs reviewed








Hospitalist Physical





- Constitutional


Vitals: 


 











Temp Pulse Resp BP Pulse Ox


 


 97.5 F L  84   16   100/35   83 L


 


 08/17/18 08:17  08/17/18 08:17  08/17/18 08:17  08/17/18 08:17  08/17/18 08:17











General appearance: Present: mild distress, cachectic, disheveled, other (

malnourished)





- EENT


Eyes: Present: PERRL, EOM intact





- Neck


Neck: Present: supple, normal ROM





- Respiratory


Respiratory effort: normal


Respiratory: bilateral: diminished, negative: rales, rhonchi, wheezing





- Cardiovascular


Rhythm: regular


Heart Sounds: Present: S1 & S2





- Extremities


Extremities: no ischemia, No edema





- Abdominal


General gastrointestinal: soft, non-tender, non-distended, normal bowel sounds





- Integumentary


Integumentary: Present: clear, warm





- Psychiatric


Psychiatric: other (Minimally communicative)





- Neurologic


Neurologic: moves all extremities, other





Results





- Labs


CBC & Chem 7: 


 08/17/18 06:17





 08/17/18 06:17


Labs: 


 Laboratory Last Values











WBC  12.8 K/mm3 (4.5-11.0)  H  08/17/18  06:17    


 


RBC  3.01 M/mm3 (3.65-5.03)  L  08/17/18  06:17    


 


Hgb  8.8 gm/dl (10.1-14.3)  L  08/17/18  06:17    


 


Hct  27.4 % (30.3-42.9)  L  08/17/18  06:17    


 


MCV  91 fl (79-97)   08/17/18  06:17    


 


MCH  29 pg (28-32)   08/17/18  06:17    


 


MCHC  32 % (30-34)   08/17/18  06:17    


 


RDW  16.4 % (13.2-15.2)  H  08/17/18  06:17    


 


Plt Count  189 K/mm3 (140-440)   08/17/18  06:17    


 


Add Manual Diff  Complete   08/17/18  06:17    


 


Total Counted  100   08/17/18  06:17    


 


Seg Neuts % (Manual)  86.0 % (40.0-70.0)  H  08/17/18  06:17    


 


Band Neutrophils %  0 %  08/17/18  06:17    


 


Lymphocytes % (Manual)  7.0 % (13.4-35.0)  L  08/17/18  06:17    


 


Reactive Lymphs % (Man)  0 %  08/17/18  06:17    


 


Monocytes % (Manual)  7.0 % (0.0-7.3)   08/17/18  06:17    


 


Eosinophils % (Manual)  0 % (0.0-4.3)   08/17/18  06:17    


 


Basophils % (Manual)  0 % (0.0-1.8)   08/17/18  06:17    


 


Metamyelocytes %  0 %  08/17/18  06:17    


 


Myelocytes %  0 %  08/17/18  06:17    


 


Promyelocytes %  0 %  08/17/18  06:17    


 


Blast Cells %  0 %  08/17/18  06:17    


 


Nucleated RBC %  Not Reportable   08/17/18  06:17    


 


Seg Neutrophils # Man  11.0 K/mm3 (1.8-7.7)  H  08/17/18  06:17    


 


Band Neutrophils #  0.0 K/mm3  08/17/18  06:17    


 


Lymphocytes # (Manual)  0.9 K/mm3 (1.2-5.4)  L  08/17/18  06:17    


 


Abs React Lymphs (Man)  0.0 K/mm3  08/17/18  06:17    


 


Monocytes # (Manual)  0.9 K/mm3 (0.0-0.8)  H  08/17/18  06:17    


 


Eosinophils # (Manual)  0.0 K/mm3 (0.0-0.4)   08/17/18  06:17    


 


Basophils # (Manual)  0.0 K/mm3 (0.0-0.1)   08/17/18  06:17    


 


Metamyelocytes #  0.0 K/mm3  08/17/18  06:17    


 


Myelocytes #  0.0 K/mm3  08/17/18  06:17    


 


Promyelocytes #  0.0 K/mm3  08/17/18  06:17    


 


Blast Cells #  0.0 K/mm3  08/17/18  06:17    


 


WBC Morphology  Not Reportable   08/17/18  06:17    


 


Hypersegmented Neuts  Not Reportable   08/17/18  06:17    


 


Hyposegmented Neuts  Not Reportable   08/17/18  06:17    


 


Hypogranular Neuts  Not Reportable   08/17/18  06:17    


 


Smudge Cells  Not Reportable   08/17/18  06:17    


 


Toxic Granulation  Not Reportable   08/17/18  06:17    


 


Toxic Vacuolation  Not Reportable   08/17/18  06:17    


 


Dohle Bodies  Not Reportable   08/17/18  06:17    


 


Pelger-Huet Anomaly  Not Reportable   08/17/18  06:17    


 


Chani Rods  Not Reportable   08/17/18  06:17    


 


Platelet Estimate  Cons   08/17/18  06:17    


 


Clumped Platelets  Not Reportable   08/17/18  06:17    


 


Plt Clumps, EDTA  Not Reportable   08/17/18  06:17    


 


Large Platelets  Not Reportable   08/17/18  06:17    


 


Giant Platelets  Not Reportable   08/17/18  06:17    


 


Platelet Satelliting  Not Reportable   08/17/18  06:17    


 


Plt Morphology Comment  Not Reportable   08/17/18  06:17    


 


RBC Morphology  Not Reportable   08/17/18  06:17    


 


Dimorphic RBCs  Not Reportable   08/17/18  06:17    


 


Polychromasia  Not Reportable   08/17/18  06:17    


 


Hypochromasia  Not Reportable   08/17/18  06:17    


 


Poikilocytosis  1+   08/17/18  06:17    


 


Anisocytosis  1+   08/17/18  06:17    


 


Microcytosis  Not Reportable   08/17/18  06:17    


 


Macrocytosis  Not Reportable   08/17/18  06:17    


 


Spherocytes  Not Reportable   08/17/18  06:17    


 


Pappenheimer Bodies  Not Reportable   08/17/18  06:17    


 


Sickle Cells  Not Reportable   08/17/18  06:17    


 


Target Cells  Not Reportable   08/17/18  06:17    


 


Tear Drop Cells  Not Reportable   08/17/18  06:17    


 


Ovalocytes  Few   08/17/18  06:17    


 


Helmet Cells  Not Reportable   08/17/18  06:17    


 


Mal[place] Bodies  Not Reportable   08/17/18  06:17    


 


Cabot Rings  Not Reportable   08/17/18  06:17    


 


Radha Cells  Not Reportable   08/17/18  06:17    


 


Bite Cells  Not Reportable   08/17/18  06:17    


 


Crenated Cell  Not Reportable   08/17/18  06:17    


 


Elliptocytes  Not Reportable   08/17/18  06:17    


 


Acanthocytes (Spur)  1+   08/17/18  06:17    


 


Rouleaux  Not Reportable   08/17/18  06:17    


 


Hemoglobin C Crystals  Not Reportable   08/17/18  06:17    


 


Schistocytes  Not Reportable   08/17/18  06:17    


 


Malaria parasites  Not Reportable   08/17/18  06:17    


 


Hugo Bodies  Not Reportable   08/17/18  06:17    


 


Hem Pathologist Commnt  No   08/17/18  06:17    


 


APTT  24.4 Sec. (24.2-36.6)   08/15/18  15:05    


 


Sodium  132 mmol/L (137-145)  L  08/17/18  06:17    


 


Potassium  5.0 mmol/L (3.6-5.0)   08/17/18  06:17    


 


Chloride  94.7 mmol/L ()  L  08/17/18  06:17    


 


Carbon Dioxide  12 mmol/L (22-30)  L  08/17/18  06:17    


 


Anion Gap  30 mmol/L  08/17/18  06:17    


 


BUN  63 mg/dL (7-17)  H  08/17/18  06:17    


 


Creatinine  5.6 mg/dL (0.7-1.2)  H  08/17/18  06:17    


 


Estimated GFR  7 ml/min  08/17/18  06:17    


 


BUN/Creatinine Ratio  11 %  08/17/18  06:17    


 


Glucose  225 mg/dL ()  H  08/17/18  06:17    


 


POC Glucose  241  ()  H  08/17/18  07:40    


 


Lactic Acid  0.90 mmol/L (0.7-2.0)   08/16/18  00:40    


 


Calcium  7.1 mg/dL (8.4-10.2)  L  08/17/18  06:17    


 


Magnesium  2.20 mg/dL (1.7-2.3)   08/17/18  06:17    


 


Total Bilirubin  0.20 mg/dL (0.1-1.2)   08/17/18  06:17    


 


AST  38 units/L (5-40)   08/17/18  06:17    


 


ALT  10 units/L (7-56)   08/17/18  06:17    


 


Alkaline Phosphatase  72 units/L ()   08/17/18  06:17    


 


Total Creatine Kinase  519 units/L ()  H  08/17/18  06:17    


 


Troponin T  0.053 ng/mL (0.00-0.029)  H  08/15/18  15:05    


 


Total Protein  4.8 g/dL (6.3-8.2)  L D 08/17/18  06:17    


 


Albumin  2.0 g/dL (3.9-5)  L  08/17/18  06:17    


 


Albumin/Globulin Ratio  0.7 %  08/17/18  06:17    


 


Triglycerides  135 mg/dL (2-149)   08/15/18  15:05    


 


Cholesterol  96 mg/dL ()   08/15/18  15:05    


 


LDL Cholesterol Direct  27 mg/dL ()  L  08/15/18  15:05    


 


HDL Cholesterol  32 mg/dL (40-59)  L  08/15/18  15:05    


 


Cholesterol/HDL Ratio  3.00 %  08/15/18  15:05    


 


TSH  7.530 mlU/mL (0.270-4.200)  H  08/15/18  15:05    


 


Urine Color  Yellow  (Yellow)   08/16/18  11:45    


 


Urine Turbidity  Cloudy  (Clear)   08/16/18  11:45    


 


Urine pH  5.0  (5.0-7.0)   08/16/18  11:45    


 


Ur Specific Gravity  1.009  (1.003-1.030)   08/16/18  11:45    


 


Urine Protein  30 mg/dl mg/dL (Negative)   08/16/18  11:45    


 


Urine Glucose (UA)  Neg mg/dL (Negative)   08/16/18  11:45    


 


Urine Ketones  Neg mg/dL (Negative)   08/16/18  11:45    


 


Urine Blood  Neg  (Negative)   08/16/18  11:45    


 


Urine Nitrite  Neg  (Negative)   08/16/18  11:45    


 


Urine Bilirubin  Neg  (Negative)   08/16/18  11:45    


 


Urine Urobilinogen  < 2.0 mg/dL (<2.0)   08/16/18  11:45    


 


Ur Leukocyte Esterase  Tr  (Negative)   08/16/18  11:45    


 


Urine WBC (Auto)  3.0 /HPF (0.0-6.0)   08/16/18  11:45    


 


Urine RBC (Auto)  1.0 /HPF (0.0-6.0)   08/16/18  11:45    


 


Urine Bacteria (Auto)  2+ /HPF (Negative)   08/16/18  11:45    


 


Urine Creatinine  47.8 mg/dL (0.1-20.0)  H  08/16/18  11:45    


 


Urine Sodium  27 mmol/L  08/16/18  11:45

## 2018-08-17 NOTE — GASTROENTEROLOGY PROGRESS NOTE
Assessment and Plan


1.abd pain


 2.diarrhea


 3.abnormal CT


-afebrile


-WBC 12.8-trending down


-C-diff and stool studies pending


-abd CT showed concentric wall thickening involving the proximal sigmoid colon (

consistent with C-diff colitis?)


-etiology unclear-possibly infectious vs other (diverticulitis? , cannot 

exclude malignancy)


-clinically, patient seems to be slowly improving. Abd pain still present but 

she is more alert today with diarrhea improved and no N/V. Tolerating diet.


-continue abx therapy (consider d/c of Zosyn if no other source of infection is 

found) and supportive care


-electrolyte management per primary team


-will follow











Subjective


Date of service: 08/17/18


Principal diagnosis: abd pain, sigmoid colon thickening


Interval history: 


Patient resting in bed this am w/o acute distress. Noted to be more alert and 

talkative. Family at bedside. No BMs overnight or this am but abd pain still 

present. No N/V. Tolerating soft diet. 








Objective





- Constitutional


Vitals: 


 











Temp Pulse Resp BP Pulse Ox


 


 97.5 F L  84   16   100/35   83 L


 


 08/17/18 08:17  08/17/18 08:17  08/17/18 08:17  08/17/18 08:17  08/17/18 08:17











General appearance: no acute distress





- Respiratory


Respiratory: bilateral: CTA





- Cardiovascular


Rhythm: regular


Heart Sounds: Present: S1 & S2





- Gastrointestinal


General gastrointestinal: Present: soft, tender, non-distended, normal bowel 

sounds





- Labs


CBC & Chem 7: 


 08/17/18 06:17





 08/17/18 06:17


Labs: 


 Laboratory Results - last 24 hr











  08/16/18 08/16/18 08/16/18





  10:59 11:45 11:45


 


WBC   


 


RBC   


 


Hgb   


 


Hct   


 


MCV   


 


MCH   


 


MCHC   


 


RDW   


 


Plt Count   


 


Sodium   


 


Potassium   


 


Chloride   


 


Carbon Dioxide   


 


Anion Gap   


 


BUN   


 


Creatinine   


 


Estimated GFR   


 


BUN/Creatinine Ratio   


 


Glucose   


 


POC Glucose  234 H  


 


Calcium   


 


Magnesium   


 


Total Bilirubin   


 


AST   


 


ALT   


 


Alkaline Phosphatase   


 


Total Creatine Kinase   


 


Total Protein   


 


Albumin   


 


Albumin/Globulin Ratio   


 


Urine Color   Yellow 


 


Urine Turbidity   Cloudy 


 


Urine pH   5.0 


 


Ur Specific Gravity   1.009 


 


Urine Protein   30 mg/dl 


 


Urine Glucose (UA)   Neg 


 


Urine Ketones   Neg 


 


Urine Blood   Neg 


 


Urine Nitrite   Neg 


 


Urine Bilirubin   Neg 


 


Urine Urobilinogen   < 2.0 


 


Ur Leukocyte Esterase   Tr 


 


Urine WBC (Auto)   3.0 


 


Urine RBC (Auto)   1.0 


 


Urine Bacteria (Auto)   2+ 


 


Urine Creatinine    47.8 H


 


Urine Sodium    27














  08/16/18 08/17/18 08/17/18





  16:48 06:17 06:17


 


WBC   12.8 H 


 


RBC   3.01 L 


 


Hgb   8.8 L 


 


Hct   27.4 L 


 


MCV   91 


 


MCH   29 


 


MCHC   32 


 


RDW   16.4 H 


 


Plt Count   189 


 


Sodium    132 L


 


Potassium    5.0


 


Chloride    94.7 L


 


Carbon Dioxide    12 L


 


Anion Gap    30


 


BUN    63 H


 


Creatinine    5.6 H


 


Estimated GFR    7


 


BUN/Creatinine Ratio    11


 


Glucose    225 H


 


POC Glucose  117 H  


 


Calcium    7.1 L


 


Magnesium    2.20


 


Total Bilirubin    0.20


 


AST    38


 


ALT    10


 


Alkaline Phosphatase    72


 


Total Creatine Kinase    519 H


 


Total Protein    4.8 L D


 


Albumin    2.0 L


 


Albumin/Globulin Ratio    0.7


 


Urine Color   


 


Urine Turbidity   


 


Urine pH   


 


Ur Specific Gravity   


 


Urine Protein   


 


Urine Glucose (UA)   


 


Urine Ketones   


 


Urine Blood   


 


Urine Nitrite   


 


Urine Bilirubin   


 


Urine Urobilinogen   


 


Ur Leukocyte Esterase   


 


Urine WBC (Auto)   


 


Urine RBC (Auto)   


 


Urine Bacteria (Auto)   


 


Urine Creatinine   


 


Urine Sodium   














  08/17/18





  07:40


 


WBC 


 


RBC 


 


Hgb 


 


Hct 


 


MCV 


 


MCH 


 


MCHC 


 


RDW 


 


Plt Count 


 


Sodium 


 


Potassium 


 


Chloride 


 


Carbon Dioxide 


 


Anion Gap 


 


BUN 


 


Creatinine 


 


Estimated GFR 


 


BUN/Creatinine Ratio 


 


Glucose 


 


POC Glucose  241 H


 


Calcium 


 


Magnesium 


 


Total Bilirubin 


 


AST 


 


ALT 


 


Alkaline Phosphatase 


 


Total Creatine Kinase 


 


Total Protein 


 


Albumin 


 


Albumin/Globulin Ratio 


 


Urine Color 


 


Urine Turbidity 


 


Urine pH 


 


Ur Specific Gravity 


 


Urine Protein 


 


Urine Glucose (UA) 


 


Urine Ketones 


 


Urine Blood 


 


Urine Nitrite 


 


Urine Bilirubin 


 


Urine Urobilinogen 


 


Ur Leukocyte Esterase 


 


Urine WBC (Auto) 


 


Urine RBC (Auto) 


 


Urine Bacteria (Auto) 


 


Urine Creatinine 


 


Urine Sodium

## 2018-08-17 NOTE — PROGRESS NOTE
Assessment and Plan


Patient remains in sinus rhythm with PACs. Continue current management. Will 

see as needed. 











The patient has been seen in conjunction with Dr. Portillo who agrees with the 

assessment and plan of care. 





- Patient Problems


(1) Sepsis


Current Visit: Yes   Status: Acute   


Qualifiers: 


   Sepsis type: sepsis due to unspecified organism   Qualified Code(s): A41.9 - 

Sepsis, unspecified organism   





(2) Diarrhea


Current Visit: Yes   Status: Acute   





(3) Paroxysmal atrial fibrillation


Current Visit: Yes   Status: Acute   





(4) Acute kidney injury superimposed on chronic kidney disease


Current Visit: Yes   Status: Acute   





(5) CAD (coronary artery disease)


Current Visit: Yes   Status: Chronic   





(6) Hx of CABG


Current Visit: Yes   Status: Chronic   





(7) Type 2 diabetes mellitus


Current Visit: No   Status: Chronic   





Subjective


Date of service: 08/17/18


Principal diagnosis: abd pain, sigmoid colon thickening


Interval history: 


The patient is resting in bed. C/o back pain. Diarrhea improved. Sinus rhythm 

with PACs on the monitor. 





Objective


 


 Last Vital Signs











Temp  97.5 F L  08/17/18 08:17


 


Pulse  84   08/17/18 08:17


 


Resp  16   08/17/18 08:17


 


BP  100/35   08/17/18 08:17


 


Pulse Ox  83 L  08/17/18 08:17

















- Physical Examination


General: No Apparent Distress


HEENT: Positive: EOMI


Neck: Positive: neck supple, trachea midline


Cardiac: Positive: Reg Rate and Rhythm, S1/S2


Lungs: Positive: clear to auscultation


Neuro: Positive: Grossly Intact


Abdomen: Positive: Soft, Tender


Skin: Positive: Clear.  Negative: Rash


Extremities: Present: normal.  Absent: edema





- Labs and Meds


 Cardiac Enzymes











  08/17/18 Range/Units





  06:17 


 


AST  38  (5-40)  units/L








 CBC











  08/17/18 Range/Units





  06:17 


 


WBC  12.8 H  (4.5-11.0)  K/mm3


 


RBC  3.01 L  (3.65-5.03)  M/mm3


 


Hgb  8.8 L  (10.1-14.3)  gm/dl


 


Hct  27.4 L  (30.3-42.9)  %


 


Plt Count  189  (140-440)  K/mm3








 Comprehensive Metabolic Panel











  08/17/18 Range/Units





  06:17 


 


Sodium  132 L  (137-145)  mmol/L


 


Potassium  5.0  (3.6-5.0)  mmol/L


 


Chloride  94.7 L  ()  mmol/L


 


Carbon Dioxide  12 L  (22-30)  mmol/L


 


BUN  63 H  (7-17)  mg/dL


 


Creatinine  5.6 H  (0.7-1.2)  mg/dL


 


Glucose  225 H  ()  mg/dL


 


Calcium  7.1 L  (8.4-10.2)  mg/dL


 


AST  38  (5-40)  units/L


 


ALT  10  (7-56)  units/L


 


Alkaline Phosphatase  72  ()  units/L


 


Total Protein  4.8 L D  (6.3-8.2)  g/dL


 


Albumin  2.0 L  (3.9-5)  g/dL














- Imaging and Cardiology


Stress echo: other (8/2018 normal lv function no signficant ischemia )


Echo: report reviewed (5/2018 normal lv function no signficant regurtitations )





- Telemetry


EKG Rhythm: Sinus Rhythm





- EKG


Sinus rhythms and dysrhythmias: sinus rhythm

## 2018-08-18 LAB
BUN SERPL-MCNC: 71 MG/DL (ref 7–17)
BUN SERPL-MCNC: 72 MG/DL (ref 7–17)
BUN SERPL-MCNC: 73 MG/DL (ref 7–17)
BUN/CREAT SERPL: 11 %
BUN/CREAT SERPL: 12 %
BUN/CREAT SERPL: 12 %
CALCIUM SERPL-MCNC: 6.8 MG/DL (ref 8.4–10.2)
CALCIUM SERPL-MCNC: 7 MG/DL (ref 8.4–10.2)
CALCIUM SERPL-MCNC: 7.1 MG/DL (ref 8.4–10.2)
HEMOLYSIS INDEX: 14
HEMOLYSIS INDEX: 3
HEMOLYSIS INDEX: 34

## 2018-08-18 PROCEDURE — 4A033R1 MEASUREMENT OF ARTERIAL SATURATION, PERIPHERAL, PERCUTANEOUS APPROACH: ICD-10-PCS | Performed by: INTERNAL MEDICINE

## 2018-08-18 PROCEDURE — 0BH17EZ INSERTION OF ENDOTRACHEAL AIRWAY INTO TRACHEA, VIA NATURAL OR ARTIFICIAL OPENING: ICD-10-PCS | Performed by: INTERNAL MEDICINE

## 2018-08-18 PROCEDURE — B548ZZA ULTRASONOGRAPHY OF SUPERIOR VENA CAVA, GUIDANCE: ICD-10-PCS | Performed by: INTERNAL MEDICINE

## 2018-08-18 PROCEDURE — 5A1955Z RESPIRATORY VENTILATION, GREATER THAN 96 CONSECUTIVE HOURS: ICD-10-PCS | Performed by: INTERNAL MEDICINE

## 2018-08-18 PROCEDURE — 02HV33Z INSERTION OF INFUSION DEVICE INTO SUPERIOR VENA CAVA, PERCUTANEOUS APPROACH: ICD-10-PCS | Performed by: INTERNAL MEDICINE

## 2018-08-18 PROCEDURE — 5A12012 PERFORMANCE OF CARDIAC OUTPUT, SINGLE, MANUAL: ICD-10-PCS | Performed by: INTERNAL MEDICINE

## 2018-08-18 RX ADMIN — MEMANTINE SCH: 5 TABLET ORAL at 09:35

## 2018-08-18 RX ADMIN — IPRATROPIUM BROMIDE AND ALBUTEROL SULFATE SCH: .5; 3 SOLUTION RESPIRATORY (INHALATION) at 11:39

## 2018-08-18 RX ADMIN — NOREPINEPHRINE BITARTRATE SCH MLS/HR: 16 INJECTION, SOLUTION INTRAVENOUS at 21:42

## 2018-08-18 RX ADMIN — ASPIRIN SCH MG: 81 TABLET, CHEWABLE ORAL at 21:58

## 2018-08-18 RX ADMIN — LORATADINE SCH: 10 TABLET ORAL at 09:35

## 2018-08-18 RX ADMIN — NOREPINEPHRINE BITARTRATE SCH MLS/HR: 16 INJECTION, SOLUTION INTRAVENOUS at 18:40

## 2018-08-18 RX ADMIN — IPRATROPIUM BROMIDE AND ALBUTEROL SULFATE SCH AMPUL: .5; 3 SOLUTION RESPIRATORY (INHALATION) at 19:45

## 2018-08-18 RX ADMIN — NOREPINEPHRINE BITARTRATE SCH MLS/HR: 16 INJECTION, SOLUTION INTRAVENOUS at 13:08

## 2018-08-18 RX ADMIN — NOREPINEPHRINE BITARTRATE SCH MLS/HR: 16 INJECTION, SOLUTION INTRAVENOUS at 10:36

## 2018-08-18 RX ADMIN — SODIUM POLYSTYRENE SULFONATE ONE GM: 15 SUSPENSION ORAL; RECTAL at 14:28

## 2018-08-18 RX ADMIN — METRONIDAZOLE SCH MLS/HR: 5 INJECTION, SOLUTION INTRAVENOUS at 14:22

## 2018-08-18 RX ADMIN — NOREPINEPHRINE BITARTRATE SCH MLS/HR: 16 INJECTION, SOLUTION INTRAVENOUS at 16:32

## 2018-08-18 RX ADMIN — METRONIDAZOLE SCH MLS/HR: 5 INJECTION, SOLUTION INTRAVENOUS at 05:39

## 2018-08-18 RX ADMIN — Medication SCH ML: at 09:36

## 2018-08-18 RX ADMIN — MULTIVITAMIN TABLET SCH: TABLET at 09:36

## 2018-08-18 RX ADMIN — METRONIDAZOLE SCH MLS/HR: 5 INJECTION, SOLUTION INTRAVENOUS at 23:17

## 2018-08-18 RX ADMIN — SODIUM BICARBONATE SCH MLS/HR: 84 INJECTION, SOLUTION INTRAVENOUS at 11:26

## 2018-08-18 RX ADMIN — NOREPINEPHRINE BITARTRATE SCH MLS/HR: 16 INJECTION, SOLUTION INTRAVENOUS at 07:45

## 2018-08-18 RX ADMIN — LEVOTHYROXINE SODIUM SCH MCG: 0.05 TABLET ORAL at 05:39

## 2018-08-18 RX ADMIN — PIPERACILLIN SODIUM AND TAZOBACTAM SODIUM SCH MLS/HR: 2; .25 INJECTION, POWDER, LYOPHILIZED, FOR SOLUTION INTRAVENOUS at 18:05

## 2018-08-18 RX ADMIN — SODIUM POLYSTYRENE SULFONATE ONE: 15 SUSPENSION ORAL; RECTAL at 18:32

## 2018-08-18 RX ADMIN — Medication SCH ML: at 21:59

## 2018-08-18 RX ADMIN — EZETIMIBE SCH: 10 TABLET ORAL at 09:36

## 2018-08-18 RX ADMIN — Medication SCH: at 09:35

## 2018-08-18 RX ADMIN — SODIUM BICARBONATE SCH MLS/HR: 84 INJECTION, SOLUTION INTRAVENOUS at 19:07

## 2018-08-18 RX ADMIN — IPRATROPIUM BROMIDE AND ALBUTEROL SULFATE SCH AMPUL: .5; 3 SOLUTION RESPIRATORY (INHALATION) at 16:50

## 2018-08-18 RX ADMIN — GUAIFENESIN SCH MG: 600 TABLET ORAL at 21:58

## 2018-08-18 RX ADMIN — MEMANTINE SCH MG: 5 TABLET ORAL at 21:57

## 2018-08-18 RX ADMIN — GUAIFENESIN SCH: 600 TABLET ORAL at 09:35

## 2018-08-18 RX ADMIN — PIPERACILLIN SODIUM AND TAZOBACTAM SODIUM SCH MLS/HR: 2; .25 INJECTION, POWDER, LYOPHILIZED, FOR SOLUTION INTRAVENOUS at 10:26

## 2018-08-18 RX ADMIN — Medication SCH EACH: at 21:58

## 2018-08-18 RX ADMIN — FAMOTIDINE SCH MG: 20 TABLET ORAL at 21:58

## 2018-08-18 RX ADMIN — FAMOTIDINE SCH: 20 TABLET ORAL at 09:35

## 2018-08-18 NOTE — EVENT NOTE
Date: 08/18/18





I responded to the floor from the ER for cold blue.  Nursing reports that the 

patient became bradycardic to the 20s with agonal respirations.  Patient 

received chest compressions and bag valve mask ventilations with return in 

spontaneous circulation.  A. fib rate 60s on the monitor with agonal 

respirations upon my arrival.  Difficulty detecting a consistent oxygen 

saturation with pulse ox, however it would intermittently register in the 90s 

with oxygen and bagging support.  Patient remains obtunded with intermittent 

agonal respiration and therefore prepped for intubation. 





- Intubation


Time Out Performed: Yes


Sedative: Etomidate


Mg Given: 20


Paralytic: Succinylcholine


Mg Given: 100


Laryngoscope: Geovani


Size: 4


ET Tube Size: 7.5


Tube Secured Depth (cm): 22


Tube Secured Location: lips


Tube Placement Confirmation: visualized tube passing t, equal breath sounds 

bilat, no breath sounds over epi, confirmation by capnometr


Patient Tolerated Procedure: well


Intubation Complications: none


Additional Comments: 





Dr Arauz at bedside. pt to be transfered to ICU. CXR pending and to be followed 

by team caring for the patient

## 2018-08-18 NOTE — XRAY REPORT
FINAL REPORT



EXAM:  XR CHEST 1V AP



HISTORY:  cardiac arrest;  ETTUBE PLACEMENT 



TECHNIQUE:  AP portable view(s) of the chest obtained.



PRIORS:  None.



FINDINGS:  

Endotracheal tube terminates approximately 3 cm from the josiane.

No mediastinal shift. Cardiac silhouette is not enlarged.

Overlying sternotomy wires. No pneumothorax, effusion, or focal

pulmonary opacity identified. Gastric distension. No acute

skeletal findings. 



IMPRESSION:  

Endotracheal tube is satisfactory in appearance. There is no

pneumothorax. Gas distension of the stomach is noted. Consider

decompression with nasogastric or similar tube.

## 2018-08-18 NOTE — PROGRESS NOTE
Assessment and Plan





1. Acute kidney injury:


FELICIA superimposed on CKD in the setting of volume depletion and hypotension.


Creatinine continue to increase.


Continue maintenance IV fluids.


Renal prognosis is guarded.


D/w her  regarding dialysis if the kidney function continue to get 

worse.  He dont want to do any kind of dialysis and verbalizes understanding.





2. Electrolytes:


Hyperkalemia, potassium level is improving.


Anion gap metabolic acidosis, on IV Sodium bicarbonate drip.





3. S/p PEA.





4. Hypotension:


On Levophed.





5. Sepsis.





6. A.fib.





D/w her  at the bedside.





Subjective


Date of service: 08/18/18


Principal diagnosis: abd pain, sigmoid colon thickening


Interval history: 


Patient was seen and examined at the bedside.


Events noted, PEA this AM.





Objective





- Vital Signs


Vital signs: 


 Vital Signs - 12hr











  08/18/18 08/18/18 08/18/18





  04:53 07:28 07:30


 


Temperature 98.3 F  


 


Pulse Rate 160 H 74 72


 


Respiratory 16  14





Rate   


 


Blood Pressure 82/44  


 


O2 Sat by Pulse 96  





Oximetry   














  08/18/18 08/18/18 08/18/18





  07:40 07:41 07:50


 


Temperature   


 


Pulse Rate 71 71 71


 


Respiratory 17 5 L 16





Rate   


 


Blood Pressure 88/36  


 


O2 Sat by Pulse   





Oximetry   














  08/18/18 08/18/18 08/18/18





  08:00 08:10 08:20


 


Temperature 94.4 F L  


 


Pulse Rate 74 91 H 68


 


Respiratory 27 H 19 22





Rate   


 


Blood Pressure 88/36 85/40 119/46


 


O2 Sat by Pulse   





Oximetry   














  08/18/18 08/18/18 08/18/18





  08:30 08:40 08:50


 


Temperature   


 


Pulse Rate 70 71 73


 


Respiratory 14 13 18





Rate   


 


Blood Pressure 116/30 118/40 118/40


 


O2 Sat by Pulse   





Oximetry   














  08/18/18 08/18/18 08/18/18





  09:00 09:10 09:20


 


Temperature   


 


Pulse Rate 77 74 75


 


Respiratory 17 18 12





Rate   


 


Blood Pressure 107/42 105/51 108/44


 


O2 Sat by Pulse   89





Oximetry   














  08/18/18 08/18/18 08/18/18





  09:30 09:40 09:50


 


Temperature   


 


Pulse Rate 78 79 68


 


Respiratory 12 16 18





Rate   


 


Blood Pressure 105/48 91/39 100/41


 


O2 Sat by Pulse   





Oximetry   














  08/18/18 08/18/18 08/18/18





  10:00 10:10 10:20


 


Temperature   


 


Pulse Rate 82 78 77


 


Respiratory 14 15 22





Rate   


 


Blood Pressure 106/38 105/26 92/43


 


O2 Sat by Pulse 100  





Oximetry   














  08/18/18 08/18/18 08/18/18





  10:30 10:40 10:50


 


Temperature   


 


Pulse Rate 70 79 77


 


Respiratory 16 19 19





Rate   


 


Blood Pressure 97/41 98/47 101/39


 


O2 Sat by Pulse   





Oximetry   














  08/18/18 08/18/18 08/18/18





  11:00 11:10 11:20


 


Temperature   


 


Pulse Rate 85 83 79


 


Respiratory 14 16 13





Rate   


 


Blood Pressure 108/49 103/45 111/39


 


O2 Sat by Pulse   89





Oximetry   














  08/18/18 08/18/18 08/18/18





  11:30 11:40 11:50


 


Temperature   


 


Pulse Rate 77 77 81


 


Respiratory 15 15 17





Rate   


 


Blood Pressure 108/36 107/40 113/43


 


O2 Sat by Pulse   





Oximetry   














  08/18/18 08/18/18 08/18/18





  12:00 12:10 12:20


 


Temperature 97.0 F L  


 


Pulse Rate 76 78 78


 


Respiratory 16 13 20





Rate   


 


Blood Pressure 95/37 104/40 101/36


 


O2 Sat by Pulse 100  





Oximetry   














  08/18/18 08/18/18 08/18/18





  12:30 12:40 12:50


 


Temperature   


 


Pulse Rate 80 81 87


 


Respiratory 14 15 14





Rate   


 


Blood Pressure 106/35 104/41 102/43


 


O2 Sat by Pulse 95  35 L





Oximetry   














- General Appearance


General appearance: well-developed, appears stated age, intubated, other (on 

the vent)


EENT: ATNC, sclerotic cornea (left eye)


Neck: supple


Respiratory: Present: Other (coarse breath sounds)


Cardiology: irregularly irregular, S1S2, no murmurs


Gastrointestinal: normoactive bowel sounds


Integumentary: no rash, warm and dry


Neurologic: other (grimaces)


Musculoskeletal: other (no edema)





- Lab





 08/17/18 06:17





 08/18/18 17:34


 Most recent lab results











Calcium  7.0 mg/dL (8.4-10.2)  L  08/18/18  12:33    


 


Magnesium  2.20 mg/dL (1.7-2.3)   08/17/18  06:17    


 


Urine Creatinine  47.8 mg/dL (0.1-20.0)  H  08/16/18  11:45    


 


Urine Sodium  27 mmol/L  08/16/18  11:45

## 2018-08-18 NOTE — PROCEDURE NOTE
Date of procedure: 08/18/18


Pre-op diagnosis: Hypotension, Cardiac Arrest


Post-op diagnosis: same


Procedure: 





Left IJ placement for vasopressor administration





After obtaining informed consent, patient was prepped and placed in 

trendelenburg.  Using ultrasound guidance, IJ on left visualized and left 

carotid identified.  Using Seldinger techinque, TLC placed without immediate 

post op complications.  Blood terry back from all 3 ports.  Biopatch placed and 

sutured in.  Staff to apply proper dressing.  Awaiting CXR for placement.


Anesthesia: local


Surgeon: SONIDO HUSTON


Estimated blood loss: none


Pathology: none


Condition: critical


Disposition: ICU

## 2018-08-18 NOTE — EVENT NOTE
Date: 08/18/18


83-year-old  female was admitted for the management of sepsis, 

hypotension, uncontrolled diabetes.  CODE BLUE was called around 7 AM this 

morning, patient was in PEA then coded according to ACLS protocol and 

successfully resuscitated and patient has pulse after 3 minutes. CBC, BMP, 

lactic acid, troponin and bolus normal saline ordered. patient intubated by ICU 

physician and transferred to the ICU. Discussed and given sign out her 

attending physician.

## 2018-08-18 NOTE — PROGRESS NOTE
Subjective


Date of service: 08/18/18


Principal diagnosis: abd pain, sigmoid colon thickening





Objective





- Vital Signs


Vital signs: 


 Vital Signs - 12hr











  08/17/18 08/17/18





  21:47 21:55


 


Pulse Rate [ 72 73





Bilateral  





Throughout]  


 


Respiratory 18 19





Rate [Bilateral  





Throughout]  


 


O2 Sat by Pulse 95 





Oximetry  














- Lab





 08/17/18 06:17





 08/17/18 06:17


 Most recent lab results











Calcium  7.1 mg/dL (8.4-10.2)  L  08/17/18  06:17    


 


Magnesium  2.20 mg/dL (1.7-2.3)   08/17/18  06:17    


 


Urine Creatinine  47.8 mg/dL (0.1-20.0)  H  08/16/18  11:45    


 


Urine Sodium  27 mmol/L  08/16/18  11:45

## 2018-08-18 NOTE — XRAY REPORT
FINAL REPORT



EXAM:  XR ABDOMEN 1V AP



HISTORY:  Dobhoff placement 



TECHNIQUE:  KUB was performed



Comparison: CT 8/15



FINDINGS:  

KUB demonstrates weighted tip feeding tube to be coiled in the

stomach with the tip near the gastric fundus.



It appears the wire may still be in place.



There are small pleural effusions on the previous CT with

emphysema in both lung bases.



Moderately distended colon.



IMPRESSION:  

Weighted tip feeding tube coiled back upon itself with tip in the

gastric fundus.



Recommend retracting the feeding tube approximately 8

centimeters, torquing the tube at the nose clockwise with the

patient in the right lateral decubitus position and advancing

approximately 5 centimeter slowly. Leave the patient in the right

lateral decubitus position and then re-image after approximately

10 minutes.



Moderate air-filled distended colon.

## 2018-08-18 NOTE — PROGRESS NOTE
Assessment and Plan


Assessment and plan: 


89-year-old very frail and malnourished and cachectic female patient with 

history of coronary artery disease status post CABG 


peripheral vascular disease chronic renal insufficiency chronic back pain, was 

admitted through emergency room with 


altered level of consciousness, poor oral intake and failure to thrive, acute 

on chronic kidney disease, and sepsis by protocol


Had cardiac arrest this morning status post CPR and intubation





--Cardiac arrest s/p CPR


Continue current management, cardiology following





--Hyperkalemia; calcium gluconate, insulin and IV D50


Nephrology following





--Acute hypoxic respiratory failure; intubated on ventilatory support


Nebulizers, wean as tolerated and extubate, pulmonary critical consulted 





--History of COPD/?interstitial lung disease;





--Hypotension/septic shock; IV fluids


Levophed per protocol, titrate blood pressures , systolic more than 100





--Sepsis ; Continue empiric antibiotics, follow cultures, IV fluids and 

supportive care





--Failure to thrive in an adult; 


Poor oral intake, no appetite, continue supportive care, nutrition supplements


2 feeding per protocol when patient is off pressors





--severe malnutrition: Nutrition supplements, nutrition consult ,supportive care





--Acute kidney injury; secondary to ATN


Worsening renal function, IV fluids, bicarbonate,d/w nephrology 





--History of coronary artery disease status post CABG; recent negative stress 

test


Continue current cardiac medications, cardiology following





--Atrial fibrillation; continue beta blockers,


No indication for anticoagulation[less than 12 hours of A. fib] now in sinus





--Hypothyroidism; continue Synthroid, thyroid panel reviewed





--Abdominal pain with nausea ;


CT abdomen; thickening of the sigmoid colon, GI advised C. difficile studies


on Flagyl, possible colonoscopy in the future as needed





--DVT prophylaxis; SCDs, Lovenox renal dose





Patient is critically ill with poor prognosis


Patient's condition, recent events, poor prognosis discussed in detail with 

 and her daughter


They requested DO NOT RESUSCITATE, and to continue full treatment.





Patient is DNR status





Critical care time 65 minutes








History


Interval history: 


Patient seen and examined medical records viewed


Patient had severe bradycardia and PEA, CODE BLUE was called, intubated and 


CPR was done per ACLS protocol by ER physician Dr. Casper,Transferred to ICU


Patient is hypotensive, hypothermic, severely acidotic with uncontrolled blood 

sugars





Patient is orally intubated on ventilatory support


Critically ill-looking, pale, cachectic and malnourished


Vital signs reviewed











Hospitalist Physical





- Constitutional


Vitals: 


 











Temp Pulse Resp BP Pulse Ox


 


 98.5 F   73   19   85/30   95 


 


 08/17/18 19:11  08/17/18 21:55  08/17/18 21:55  08/17/18 19:11  08/17/18 21:47











General appearance: Present: severe distress, cachectic, disheveled





- EENT


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





- Neck


Neck: Present: supple, other (teaching in place)





- Respiratory


Respiratory effort: labored


Respiratory: bilateral: diminished, rhonchi, negative: rales, wheezing





- Cardiovascular


Rhythm: regular


Heart Sounds: Present: S1 & S2





- Extremities


Extremities: no ischemia


Extremity abnormal: edema (trace edema)





- Abdominal


General gastrointestinal: soft, non-tender, non-distended, normal bowel sounds





- Integumentary


Integumentary: Present: clear, warm





- Psychiatric


Psychiatric: other (intubated unresponsive)





- Neurologic


Neurologic: other (intubated Unresponsive)





Results





- Labs


CBC & Chem 7: 


 08/17/18 06:17





 08/18/18 06:28


Labs: 


 Laboratory Last Values











WBC  12.8 K/mm3 (4.5-11.0)  H  08/17/18  06:17    


 


RBC  3.01 M/mm3 (3.65-5.03)  L  08/17/18  06:17    


 


Hgb  8.8 gm/dl (10.1-14.3)  L  08/17/18  06:17    


 


Hct  27.4 % (30.3-42.9)  L  08/17/18  06:17    


 


MCV  91 fl (79-97)   08/17/18  06:17    


 


MCH  29 pg (28-32)   08/17/18  06:17    


 


MCHC  32 % (30-34)   08/17/18  06:17    


 


RDW  16.4 % (13.2-15.2)  H  08/17/18  06:17    


 


Plt Count  189 K/mm3 (140-440)   08/17/18  06:17    


 


Add Manual Diff  Complete   08/17/18  06:17    


 


Total Counted  100   08/17/18  06:17    


 


Seg Neuts % (Manual)  86.0 % (40.0-70.0)  H  08/17/18  06:17    


 


Band Neutrophils %  0 %  08/17/18  06:17    


 


Lymphocytes % (Manual)  7.0 % (13.4-35.0)  L  08/17/18  06:17    


 


Reactive Lymphs % (Man)  0 %  08/17/18  06:17    


 


Monocytes % (Manual)  7.0 % (0.0-7.3)   08/17/18  06:17    


 


Eosinophils % (Manual)  0 % (0.0-4.3)   08/17/18  06:17    


 


Basophils % (Manual)  0 % (0.0-1.8)   08/17/18  06:17    


 


Metamyelocytes %  0 %  08/17/18  06:17    


 


Myelocytes %  0 %  08/17/18  06:17    


 


Promyelocytes %  0 %  08/17/18  06:17    


 


Blast Cells %  0 %  08/17/18  06:17    


 


Nucleated RBC %  Not Reportable   08/17/18  06:17    


 


Seg Neutrophils # Man  11.0 K/mm3 (1.8-7.7)  H  08/17/18  06:17    


 


Band Neutrophils #  0.0 K/mm3  08/17/18  06:17    


 


Lymphocytes # (Manual)  0.9 K/mm3 (1.2-5.4)  L  08/17/18  06:17    


 


Abs React Lymphs (Man)  0.0 K/mm3  08/17/18  06:17    


 


Monocytes # (Manual)  0.9 K/mm3 (0.0-0.8)  H  08/17/18  06:17    


 


Eosinophils # (Manual)  0.0 K/mm3 (0.0-0.4)   08/17/18  06:17    


 


Basophils # (Manual)  0.0 K/mm3 (0.0-0.1)   08/17/18  06:17    


 


Metamyelocytes #  0.0 K/mm3  08/17/18  06:17    


 


Myelocytes #  0.0 K/mm3  08/17/18  06:17    


 


Promyelocytes #  0.0 K/mm3  08/17/18  06:17    


 


Blast Cells #  0.0 K/mm3  08/17/18  06:17    


 


WBC Morphology  Not Reportable   08/17/18  06:17    


 


Hypersegmented Neuts  Not Reportable   08/17/18  06:17    


 


Hyposegmented Neuts  Not Reportable   08/17/18  06:17    


 


Hypogranular Neuts  Not Reportable   08/17/18  06:17    


 


Smudge Cells  Not Reportable   08/17/18  06:17    


 


Toxic Granulation  Not Reportable   08/17/18  06:17    


 


Toxic Vacuolation  Not Reportable   08/17/18  06:17    


 


Dohle Bodies  Not Reportable   08/17/18  06:17    


 


Pelger-Huet Anomaly  Not Reportable   08/17/18  06:17    


 


Chani Rods  Not Reportable   08/17/18  06:17    


 


Platelet Estimate  Cons   08/17/18  06:17    


 


Clumped Platelets  Not Reportable   08/17/18  06:17    


 


Plt Clumps, EDTA  Not Reportable   08/17/18  06:17    


 


Large Platelets  Not Reportable   08/17/18  06:17    


 


Giant Platelets  Not Reportable   08/17/18  06:17    


 


Platelet Satelliting  Not Reportable   08/17/18  06:17    


 


Plt Morphology Comment  Not Reportable   08/17/18  06:17    


 


RBC Morphology  Not Reportable   08/17/18  06:17    


 


Dimorphic RBCs  Not Reportable   08/17/18  06:17    


 


Polychromasia  Not Reportable   08/17/18  06:17    


 


Hypochromasia  Not Reportable   08/17/18  06:17    


 


Poikilocytosis  1+   08/17/18  06:17    


 


Anisocytosis  1+   08/17/18  06:17    


 


Microcytosis  Not Reportable   08/17/18  06:17    


 


Macrocytosis  Not Reportable   08/17/18  06:17    


 


Spherocytes  Not Reportable   08/17/18  06:17    


 


Pappenheimer Bodies  Not Reportable   08/17/18  06:17    


 


Sickle Cells  Not Reportable   08/17/18  06:17    


 


Target Cells  Not Reportable   08/17/18  06:17    


 


Tear Drop Cells  Not Reportable   08/17/18  06:17    


 


Ovalocytes  Few   08/17/18  06:17    


 


Helmet Cells  Not Reportable   08/17/18  06:17    


 


Maloney-Edmore Bodies  Not Reportable   08/17/18  06:17    


 


Cabot Rings  Not Reportable   08/17/18  06:17    


 


Vienna Cells  Not Reportable   08/17/18  06:17    


 


Bite Cells  Not Reportable   08/17/18  06:17    


 


Crenated Cell  Not Reportable   08/17/18  06:17    


 


Elliptocytes  Not Reportable   08/17/18  06:17    


 


Acanthocytes (Spur)  1+   08/17/18  06:17    


 


Rouleaux  Not Reportable   08/17/18  06:17    


 


Hemoglobin C Crystals  Not Reportable   08/17/18  06:17    


 


Schistocytes  Not Reportable   08/17/18  06:17    


 


Malaria parasites  Not Reportable   08/17/18  06:17    


 


Hugo Bodies  Not Reportable   08/17/18  06:17    


 


Hem Pathologist Commnt  No   08/17/18  06:17    


 


APTT  24.4 Sec. (24.2-36.6)   08/15/18  15:05    


 


Sodium  136 mmol/L (137-145)  L  08/18/18  06:28    


 


Potassium  6.2 mmol/L (3.6-5.0)  H* D 08/18/18  06:28    


 


Chloride  98.4 mmol/L ()   08/18/18  06:28    


 


Carbon Dioxide  7 mmol/L (22-30)  L*  08/18/18  06:28    


 


Anion Gap  37 mmol/L  08/18/18  06:28    


 


BUN  72 mg/dL (7-17)  H  08/18/18  06:28    


 


Creatinine  6.3 mg/dL (0.7-1.2)  H  08/18/18  06:28    


 


Estimated GFR  6 ml/min  08/18/18  06:28    


 


BUN/Creatinine Ratio  11 %  08/18/18  06:28    


 


Glucose  253 mg/dL ()  H  08/18/18  06:28    


 


POC Glucose  292  ()  H  08/18/18  07:17    


 


Lactic Acid  0.90 mmol/L (0.7-2.0)   08/16/18  00:40    


 


Calcium  7.1 mg/dL (8.4-10.2)  L  08/18/18  06:28    


 


Magnesium  2.20 mg/dL (1.7-2.3)   08/17/18  06:17    


 


Total Bilirubin  0.20 mg/dL (0.1-1.2)   08/17/18  06:17    


 


AST  38 units/L (5-40)   08/17/18  06:17    


 


ALT  10 units/L (7-56)   08/17/18  06:17    


 


Alkaline Phosphatase  72 units/L ()   08/17/18  06:17    


 


Total Creatine Kinase  519 units/L ()  H  08/17/18  06:17    


 


Troponin T  0.053 ng/mL (0.00-0.029)  H  08/15/18  15:05    


 


Total Protein  4.8 g/dL (6.3-8.2)  L D 08/17/18  06:17    


 


Albumin  2.0 g/dL (3.9-5)  L  08/17/18  06:17    


 


Albumin/Globulin Ratio  0.7 %  08/17/18  06:17    


 


Triglycerides  135 mg/dL (2-149)   08/15/18  15:05    


 


Cholesterol  96 mg/dL ()   08/15/18  15:05    


 


LDL Cholesterol Direct  27 mg/dL ()  L  08/15/18  15:05    


 


HDL Cholesterol  32 mg/dL (40-59)  L  08/15/18  15:05    


 


Cholesterol/HDL Ratio  3.00 %  08/15/18  15:05    


 


TSH  7.530 mlU/mL (0.270-4.200)  H  08/15/18  15:05    


 


Urine Color  Yellow  (Yellow)   08/16/18  11:45    


 


Urine Turbidity  Cloudy  (Clear)   08/16/18  11:45    


 


Urine pH  5.0  (5.0-7.0)   08/16/18  11:45    


 


Ur Specific Gravity  1.009  (1.003-1.030)   08/16/18  11:45    


 


Urine Protein  30 mg/dl mg/dL (Negative)   08/16/18  11:45    


 


Urine Glucose (UA)  Neg mg/dL (Negative)   08/16/18  11:45    


 


Urine Ketones  Neg mg/dL (Negative)   08/16/18  11:45    


 


Urine Blood  Neg  (Negative)   08/16/18  11:45    


 


Urine Nitrite  Neg  (Negative)   08/16/18  11:45    


 


Urine Bilirubin  Neg  (Negative)   08/16/18  11:45    


 


Urine Urobilinogen  < 2.0 mg/dL (<2.0)   08/16/18  11:45    


 


Ur Leukocyte Esterase  Tr  (Negative)   08/16/18  11:45    


 


Urine WBC (Auto)  3.0 /HPF (0.0-6.0)   08/16/18  11:45    


 


Urine RBC (Auto)  1.0 /HPF (0.0-6.0)   08/16/18  11:45    


 


Urine Bacteria (Auto)  2+ /HPF (Negative)   08/16/18  11:45    


 


Urine Creatinine  47.8 mg/dL (0.1-20.0)  H  08/16/18  11:45    


 


Urine Sodium  27 mmol/L  08/16/18  11:45

## 2018-08-18 NOTE — CONSULTATION
History of Present Illness


Consult date: 18


Requesting physician: AMY J KOCHERLA


Reason for consult: other (Acute respiratory failure post cardiac arrest.)


History of present illness: 





82 y/o female transferred to ICU POST cardiac arrest from floor.  Currently 

intubated, not on sedation.  Patient is orally intubated.  Currently on 

vasopressor therapy. Family at bedside.  They have made patient a DNR.  She is 

hypothermic.  She is also hyperkalemic.  Per report she went bradycardic on the 

floor.  





Past History


Past Medical History: CAD, COPD, diabetes, hypertension, PVD, renal failure


Past Surgical History: appendectomy, CABG, , Other (Insulin pump, 

aortofemoral bypass. axillary)


Social history: , lives with family.  denies: smoking, alcohol abuse, 

prescription drug abuse


Family history: diabetes, hypertension





Medications and Allergies


 Allergies











Allergy/AdvReac Type Severity Reaction Status Date / Time


 


hydromorphone HCl Allergy Severe Rash Verified 14 05:48





[From Dilaudid]     


 


codeine AdvReac  Swelling Verified 14 13:32


 


hydroxyprogesterone caproate AdvReac  Seizure Verified 14 05:48





[From Delalutin]     











 Home Medications











 Medication  Instructions  Recorded  Confirmed  Last Taken  Type


 


Aspirin [Aspirin BABY CHEW TAB] 81 mg PO BID 05/28/14 08/15/18 08/14/18 History


 


Calcium Carbonate/Vitamin D3 1 mg PO BID 05/28/14 08/15/18 08/14/18 History





[Calcium 600-Vit D3 400 Tablet]     


 


Cetirizine HCl 10 mg PO QDAY 05/28/14 08/15/18 08/14/18 History


 


Multivitamin [Multi-Vitamin Daily] 1 tab PO QDAY 05/28/14 08/15/18 08/14/18 

History


 


Ramipril 10 mg PO BID 05/28/14 08/15/18 08/14/18 History


 


Rosuvastatin Calcium [Crestor] 20 mg PO QHS 05/28/14 08/15/18 08/14/18 History


 


Saccharomyces Boulardii (Nf) 250 mg PO Q12HR #20 capsule 06/05/14 08/15/18 08/14

/18 Rx





[Florastor (Nf)]     


 


Ondansetron [Zofran ODT TAB] 4 mg PO Q8HR PRN #14 tab.rapdis 04/10/18 08/15/18 

08/14/18 Rx


 


Co Q-10 100 mg Softgel 100 mg PO DAILY 08/01/18 08/15/18 08/14/18 History


 


Exelon 1.5 mg PO BID 08/01/18 08/15/18 08/14/18 History


 


Furosemide 20 mg PO 2XW 08/01/18 08/15/18 08/14/18 History


 


Imdur ER 60 mg PO DAILY 08/01/18 08/15/18 08/14/18 History


 


Memantine HCl 5 mg PO BID 08/01/18 08/15/18 08/14/18 History


 


Ranitidine HCl 150 mg PO BID 08/01/18 08/15/18 08/14/18 History


 


Spiriva Respimat 2 puff INHALATION DAILY 08/01/18 08/15/18 08/14/18 History


 


Toprol Xl 25 mg PO DAILY 08/01/18 08/15/18 08/14/18 History


 


Xiidra 1 drop OD BID 08/01/18 08/15/18 08/14/18 History


 


traMADol 50 mg PO Q6HR PRN 08/01/18 08/15/18 08/14/18 History


 


Levothyroxine [Synthroid] 50 mcg PO QAM 08/15/18 08/15/18 08/14/18 History











Active Meds: 


Active Medications





Acetaminophen (Tylenol)  650 mg PO Q4H PRN


   PRN Reason: Pain MILD(1-3)/Fever >100.5/HA


Albuterol (Proventil)  2.5 mg IH Q4HRT PRN


   PRN Reason: Shortness Of Breath


Albuterol/Ipratropium (Duoneb *Not For Prn Use*)  1 ampul IH TIDRT [facility]


   Last Admin: 18 21:46 Dose:  1 ampul


Aspirin (Baby Aspirin)  81 mg PO QHS [facility]


   Last Admin: 18 22:10 Dose:  81 mg


Atorvastatin Calcium (Lipitor)  40 mg PO QHS [facility]


   Last Admin: 18 22:10 Dose:  40 mg


Calcium/Vitamin D (Oysco D 500 Mg-200 Unit)  1 each PO BID [facility]


   Last Admin: 18 09:35 Dose:  Not Given


Dextrose (D50w (25gm) Syringe)  50 ml IV PRN PRN


   PRN Reason: Hypoglycemia


Ezetimibe (Zetia)  10 mg PO QDAY [facility]


   Last Admin: 18 09:36 Dose:  Not Given


Famotidine (Pepcid)  20 mg PO BID [facility]


   Last Admin: 18 09:35 Dose:  Not Given


Guaifenesin (Mucinex Er)  600 mg PO BID [facility]


   Last Admin: 18 09:35 Dose:  Not Given


Metronidazole (Flagyl 500 Mg/100 Ml)  500 mg in 100 mls @ 100 mls/hr IV Q8HR [facility]

; Protocol


   Last Admin: 18 05:39 Dose:  100 mls/hr


Sodium Bicarbonate 50 meq/ (Sodium Chloride)  1,050 mls @ 150 mls/hr IV AS 

DIRECT SANCHEZ


Norepinephrine (Levophed Drip 4 Mg/Ns 250 Ml)  4 mg in 250 mls @ 7.5 mls/hr IV 

TITR SANCHEZ; Protocol


   Last Admin: 18 07:45 Dose:  2 mcg/min, 7.5 mls/hr


Piperacillin Sod/Tazobactam Sod (Zosyn/Ns 2.25 Gm/50ml)  2.25 gm in 50 mls @ 

100 mls/hr IV Q8H [facility]; Protocol


Insulin Human Lispro (Humalog)  0 unit SUB-Q ACHS [facility]; Protocol


   Last Admin: 18 00:28 Dose:  Not Given


Levothyroxine Sodium (Synthroid)  50 mcg PO QAM@0600 [facility]


   Last Admin: 18 05:39 Dose:  50 mcg


Loratadine (Claritin)  10 mg PO DAILY [facility]


   Last Admin: 18 09:35 Dose:  Not Given


Memantine (Namenda)  5 mg PO BID [facility]


   Last Admin: 18 09:35 Dose:  Not Given


Miscellaneous Medication (Formoterol Fumarate [Foradil])  12 mg PO BID [facility]


Miscellaneous Medication (Saccharomyces Boulardii (Nf))  250 mg PO Q12HR [facility]


Miscellaneous Medication (Xiidra)  1 drop OD BID [facility]


Multivitamins (Theragran Tab)  1 each PO DAILY [facility]


   Last Admin: 18 09:36 Dose:  Not Given


Ondansetron HCl (Zofran Odt)  4 mg PO Q8HR PRN


   PRN Reason: Nausea And Vomiting


Oxycodone/Acetaminophen (Percocet 5/325)  1 tab PO Q6HR PRN


   PRN Reason: Pain (4-6)


   Last Admin: 18 22:11 Dose:  1 tab


Sodium Chloride (Sodium Chloride Flush Syringe 10 Ml)  10 ml IV BID SANCHEZ


   Last Admin: 18 09:36 Dose:  10 ml


Sodium Chloride (Sodium Chloride Flush Syringe 10 Ml)  10 ml IV PRN PRN


   PRN Reason: LINE FLUSH


Tramadol HCl (Ultram)  50 mg PO Q6H PRN


   PRN Reason: Pain, Moderate (4-6)











Review of Systems


All systems: negative





Physical Examination


Vital signs: 


 Vital Signs











Temp Pulse BP Pulse Ox


 


 98.0 F   118 H  167/61   93 


 


 08/15/18 13:49  08/15/18 13:49  08/15/18 13:49  08/15/18 13:49











General appearance: comatose


Eyes: non-icteric


ENT: other (orally intubated and not on sedation)


Neck: supple


Ascultation: Bilateral: clear


Percussion: Bilateral: not dull


Cardiovascular: irregular rhythm


Gastrointestinal: soft


unable to assess





Results





- Laboratory Findings


CBC and BMP: 


 18 06:17





 18 06:28


ABG











POC ABG pH  7.037  (7.35-7.45)  L  18  08:34    


 


POC ABG pCO2  31.9  (35-45)  L  18  08:34    


 


POC ABG pO2  303  ()  H  18  08:34    


 


POC ABG HCO3  8.6   18  08:34    


 


POC ABG Total CO2  10   18  08:34    


 


POC ABG O2 Sat  100   18  08:34    








Abnormal lab findings: 


 Abnormal Labs











  08/15/18 08/15/18 08/15/18





  14:01 15:05 15:05


 


WBC   18.7 H 


 


RBC   3.52 L 


 


Hgb   


 


Hct   


 


RDW   15.6 H 


 


Seg Neuts % (Manual)   87.0 H 


 


Lymphocytes % (Manual)   9.0 L 


 


Seg Neutrophils # Man   16.3 H 


 


Lymphocytes # (Manual)   


 


Monocytes # (Manual)   


 


POC ABG pH   


 


POC ABG pCO2   


 


POC ABG pO2   


 


Sodium    128 L


 


Potassium   


 


Chloride    89.6 L


 


Carbon Dioxide    20 L


 


BUN    54 H


 


Creatinine    4.9 H


 


Glucose    121 H


 


POC Glucose  153 H  


 


Calcium   


 


Total Creatine Kinase    387 H


 


Troponin T    0.053 H


 


Total Protein   


 


Albumin    2.8 L


 


LDL Cholesterol Direct    27 L


 


HDL Cholesterol    32 L


 


TSH   


 


Urine Creatinine   














  08/15/18 08/15/18 08/16/18





  15:05 21:45 02:51


 


WBC   


 


RBC   


 


Hgb   


 


Hct   


 


RDW   


 


Seg Neuts % (Manual)   


 


Lymphocytes % (Manual)   


 


Seg Neutrophils # Man   


 


Lymphocytes # (Manual)   


 


Monocytes # (Manual)   


 


POC ABG pH   


 


POC ABG pCO2   


 


POC ABG pO2   


 


Sodium    129 L


 


Potassium   


 


Chloride    95.0 L


 


Carbon Dioxide    17 L


 


BUN    57 H


 


Creatinine    4.8 H


 


Glucose    131 H


 


POC Glucose   235 H 


 


Calcium    7.4 L


 


Total Creatine Kinase   


 


Troponin T   


 


Total Protein   


 


Albumin   


 


LDL Cholesterol Direct   


 


HDL Cholesterol   


 


TSH  7.530 H  


 


Urine Creatinine   














  18





  06:53 06:59 07:33


 


WBC   


 


RBC   


 


Hgb   


 


Hct   


 


RDW   


 


Seg Neuts % (Manual)   


 


Lymphocytes % (Manual)   


 


Seg Neutrophils # Man   


 


Lymphocytes # (Manual)   


 


Monocytes # (Manual)   


 


POC ABG pH   


 


POC ABG pCO2   


 


POC ABG pO2   


 


Sodium   


 


Potassium   


 


Chloride   


 


Carbon Dioxide   


 


BUN   


 


Creatinine   


 


Glucose   228 H 


 


POC Glucose  < 40 L   184 H


 


Calcium   


 


Total Creatine Kinase   


 


Troponin T   


 


Total Protein   


 


Albumin   


 


LDL Cholesterol Direct   


 


HDL Cholesterol   


 


TSH   


 


Urine Creatinine   














  18





  10:59 11:45 16:48


 


WBC   


 


RBC   


 


Hgb   


 


Hct   


 


RDW   


 


Seg Neuts % (Manual)   


 


Lymphocytes % (Manual)   


 


Seg Neutrophils # Man   


 


Lymphocytes # (Manual)   


 


Monocytes # (Manual)   


 


POC ABG pH   


 


POC ABG pCO2   


 


POC ABG pO2   


 


Sodium   


 


Potassium   


 


Chloride   


 


Carbon Dioxide   


 


BUN   


 


Creatinine   


 


Glucose   


 


POC Glucose  234 H   117 H


 


Calcium   


 


Total Creatine Kinase   


 


Troponin T   


 


Total Protein   


 


Albumin   


 


LDL Cholesterol Direct   


 


HDL Cholesterol   


 


TSH   


 


Urine Creatinine   47.8 H 














  18





  06:17 06:17 07:40


 


WBC  12.8 H  


 


RBC  3.01 L  


 


Hgb  8.8 L  


 


Hct  27.4 L  


 


RDW  16.4 H  


 


Seg Neuts % (Manual)  86.0 H  


 


Lymphocytes % (Manual)  7.0 L  


 


Seg Neutrophils # Man  11.0 H  


 


Lymphocytes # (Manual)  0.9 L  


 


Monocytes # (Manual)  0.9 H  


 


POC ABG pH   


 


POC ABG pCO2   


 


POC ABG pO2   


 


Sodium   132 L 


 


Potassium   


 


Chloride   94.7 L 


 


Carbon Dioxide   12 L 


 


BUN   63 H 


 


Creatinine   5.6 H 


 


Glucose   225 H 


 


POC Glucose    241 H


 


Calcium   7.1 L 


 


Total Creatine Kinase   519 H 


 


Troponin T   


 


Total Protein   4.8 L D 


 


Albumin   2.0 L 


 


LDL Cholesterol Direct   


 


HDL Cholesterol   


 


TSH   


 


Urine Creatinine   














  18





  12:12 17:15 05:21


 


WBC   


 


RBC   


 


Hgb   


 


Hct   


 


RDW   


 


Seg Neuts % (Manual)   


 


Lymphocytes % (Manual)   


 


Seg Neutrophils # Man   


 


Lymphocytes # (Manual)   


 


Monocytes # (Manual)   


 


POC ABG pH   


 


POC ABG pCO2   


 


POC ABG pO2   


 


Sodium   


 


Potassium   


 


Chloride   


 


Carbon Dioxide   


 


BUN   


 


Creatinine   


 


Glucose   


 


POC Glucose  237 H  165 H  318 H


 


Calcium   


 


Total Creatine Kinase   


 


Troponin T   


 


Total Protein   


 


Albumin   


 


LDL Cholesterol Direct   


 


HDL Cholesterol   


 


TSH   


 


Urine Creatinine   














  18





  06:28 07:17 08:34


 


WBC   


 


RBC   


 


Hgb   


 


Hct   


 


RDW   


 


Seg Neuts % (Manual)   


 


Lymphocytes % (Manual)   


 


Seg Neutrophils # Man   


 


Lymphocytes # (Manual)   


 


Monocytes # (Manual)   


 


POC ABG pH    7.037 L


 


POC ABG pCO2    31.9 L


 


POC ABG pO2    303 H


 


Sodium  136 L  


 


Potassium  6.2 H* D  


 


Chloride   


 


Carbon Dioxide  7 L*  


 


BUN  72 H  


 


Creatinine  6.3 H  


 


Glucose  253 H  


 


POC Glucose   292 H 


 


Calcium  7.1 L  


 


Total Creatine Kinase   


 


Troponin T   


 


Total Protein   


 


Albumin   


 


LDL Cholesterol Direct   


 


HDL Cholesterol   


 


TSH   


 


Urine Creatinine   














  18





  09:37


 


WBC 


 


RBC 


 


Hgb 


 


Hct 


 


RDW 


 


Seg Neuts % (Manual) 


 


Lymphocytes % (Manual) 


 


Seg Neutrophils # Man 


 


Lymphocytes # (Manual) 


 


Monocytes # (Manual) 


 


POC ABG pH 


 


POC ABG pCO2 


 


POC ABG pO2 


 


Sodium 


 


Potassium 


 


Chloride 


 


Carbon Dioxide 


 


BUN 


 


Creatinine 


 


Glucose 


 


POC Glucose  414 H


 


Calcium 


 


Total Creatine Kinase 


 


Troponin T 


 


Total Protein 


 


Albumin 


 


LDL Cholesterol Direct 


 


HDL Cholesterol 


 


TSH 


 


Urine Creatinine 














- Diagnostic Findings


Chest x-ray: image reviewed (CXR shows maybe some mild vascular congestion, 

Line in place with no PTX)





Assessment and Plan





82 y/o female with acute respiratory failure secondary to cardiac arrest, now 

orally intubated, hypothermic and hypotensive.





1.  Vasopressor therapy to keep map 60-65


2.  Follow up any new cardiology recs for today in regards to bradycardia seen 

prior to PEA arrest


3.  Correct electrolytes, needs Insulin, D50 and Calcium, renal following 

patient as well


4.  Follow up any new renal recs for today


5.  Suggest a trial of broad spectrum abx therapy given hypothermia and 

hypotension.  Given Zosyn but would give at least a one time dose of Vanc


6.  Given her history of hypothyroid, will given a one time dose of 

hydrocortisone, if this helps, will schedule q8


7.  Overall prognosis is poor, family has made patient DNR, will continue 

current therapies and treatments to assist.





CCT 31 minutes.

## 2018-08-18 NOTE — XRAY REPORT
FINAL REPORT



EXAM:  XR CHEST 1V AP



HISTORY:  central line placement 



COMPARISON:  Chest radiograph performed on 08/18/2018



TECHNIQUE:  Single frontal view of the chest



FINDINGS:  

Endotracheal tube with tip in the midtrachea. Left internal

jugular line with tip of the catheter at the superior cavoatrial

junction. Median sternotomy wires are unchanged in position.

There are abandoned transcutaneous wires projecting over the

right heart. 



Stable cardiomegaly. 



Scarring in the periphery of the right lung. No pleural effusion

or pneumothorax. No focal consolidation. 



No acute bony or soft tissue abnormality. 



IMPRESSION:  

Left internal jugular line with tip of the catheter is at the

superior cavoatrial junction.



Stable cardiomegaly.

## 2018-08-19 LAB
BASOPHILS # (AUTO): 0 K/MM3 (ref 0–0.1)
BASOPHILS NFR BLD AUTO: 0.2 % (ref 0–1.8)
BUN SERPL-MCNC: 71 MG/DL (ref 7–17)
BUN/CREAT SERPL: 12 %
CALCIUM SERPL-MCNC: 6.6 MG/DL (ref 8.4–10.2)
EOSINOPHIL # BLD AUTO: 0 K/MM3 (ref 0–0.4)
EOSINOPHIL NFR BLD AUTO: 0.2 % (ref 0–4.3)
HCT VFR BLD CALC: 27.1 % (ref 30.3–42.9)
HEMOLYSIS INDEX: 12
HGB BLD-MCNC: 8.8 GM/DL (ref 10.1–14.3)
LYMPHOCYTES # BLD AUTO: 1.5 K/MM3 (ref 1.2–5.4)
LYMPHOCYTES NFR BLD AUTO: 7.9 % (ref 13.4–35)
MCH RBC QN AUTO: 29 PG (ref 28–32)
MCHC RBC AUTO-ENTMCNC: 32 % (ref 30–34)
MCV RBC AUTO: 89 FL (ref 79–97)
MONOCYTES # (AUTO): 1.2 K/MM3 (ref 0–0.8)
MONOCYTES % (AUTO): 6.5 % (ref 0–7.3)
PLATELET # BLD: 263 K/MM3 (ref 140–440)
RBC # BLD AUTO: 3.05 M/MM3 (ref 3.65–5.03)

## 2018-08-19 RX ADMIN — PIPERACILLIN SODIUM AND TAZOBACTAM SODIUM SCH MLS/HR: 2; .25 INJECTION, POWDER, LYOPHILIZED, FOR SOLUTION INTRAVENOUS at 09:42

## 2018-08-19 RX ADMIN — METRONIDAZOLE SCH MLS/HR: 5 INJECTION, SOLUTION INTRAVENOUS at 13:18

## 2018-08-19 RX ADMIN — Medication SCH ML: at 21:59

## 2018-08-19 RX ADMIN — METRONIDAZOLE SCH MLS/HR: 5 INJECTION, SOLUTION INTRAVENOUS at 06:19

## 2018-08-19 RX ADMIN — Medication PRN ML: at 10:34

## 2018-08-19 RX ADMIN — IPRATROPIUM BROMIDE AND ALBUTEROL SULFATE SCH AMPUL: .5; 3 SOLUTION RESPIRATORY (INHALATION) at 13:24

## 2018-08-19 RX ADMIN — NOREPINEPHRINE BITARTRATE SCH MLS/HR: 16 INJECTION, SOLUTION INTRAVENOUS at 03:49

## 2018-08-19 RX ADMIN — EZETIMIBE SCH: 10 TABLET ORAL at 12:23

## 2018-08-19 RX ADMIN — FAMOTIDINE SCH MG: 20 TABLET ORAL at 21:29

## 2018-08-19 RX ADMIN — MULTIVITAMIN TABLET SCH: TABLET at 12:22

## 2018-08-19 RX ADMIN — Medication SCH: at 12:22

## 2018-08-19 RX ADMIN — MEMANTINE SCH: 5 TABLET ORAL at 12:21

## 2018-08-19 RX ADMIN — MEMANTINE SCH MG: 5 TABLET ORAL at 21:31

## 2018-08-19 RX ADMIN — GUAIFENESIN SCH MG: 600 TABLET ORAL at 21:31

## 2018-08-19 RX ADMIN — ENOXAPARIN SODIUM SCH MG: 100 INJECTION SUBCUTANEOUS at 21:37

## 2018-08-19 RX ADMIN — SODIUM BICARBONATE SCH MLS/HR: 84 INJECTION, SOLUTION INTRAVENOUS at 09:28

## 2018-08-19 RX ADMIN — SODIUM BICARBONATE SCH MLS/HR: 84 INJECTION, SOLUTION INTRAVENOUS at 23:05

## 2018-08-19 RX ADMIN — FLUCONAZOLE, SODIUM CHLORIDE SCH MLS/HR: 2 INJECTION INTRAVENOUS at 17:09

## 2018-08-19 RX ADMIN — LORATADINE SCH: 10 TABLET ORAL at 12:21

## 2018-08-19 RX ADMIN — ASPIRIN SCH MG: 81 TABLET, CHEWABLE ORAL at 21:29

## 2018-08-19 RX ADMIN — SODIUM BICARBONATE SCH MLS/HR: 84 INJECTION, SOLUTION INTRAVENOUS at 16:38

## 2018-08-19 RX ADMIN — FAMOTIDINE SCH: 20 TABLET ORAL at 12:22

## 2018-08-19 RX ADMIN — NOREPINEPHRINE BITARTRATE SCH MLS/HR: 16 INJECTION, SOLUTION INTRAVENOUS at 01:00

## 2018-08-19 RX ADMIN — METRONIDAZOLE SCH MLS/HR: 5 INJECTION, SOLUTION INTRAVENOUS at 21:28

## 2018-08-19 RX ADMIN — TRAMADOL HYDROCHLORIDE PRN MG: 50 TABLET, COATED ORAL at 21:37

## 2018-08-19 RX ADMIN — IPRATROPIUM BROMIDE AND ALBUTEROL SULFATE SCH AMPUL: .5; 3 SOLUTION RESPIRATORY (INHALATION) at 08:55

## 2018-08-19 RX ADMIN — PIPERACILLIN SODIUM AND TAZOBACTAM SODIUM SCH MLS/HR: 2; .25 INJECTION, POWDER, LYOPHILIZED, FOR SOLUTION INTRAVENOUS at 02:13

## 2018-08-19 RX ADMIN — LEVOTHYROXINE SODIUM SCH: 0.05 TABLET ORAL at 07:33

## 2018-08-19 RX ADMIN — Medication PRN ML: at 17:13

## 2018-08-19 RX ADMIN — GUAIFENESIN SCH: 600 TABLET ORAL at 12:21

## 2018-08-19 RX ADMIN — SODIUM BICARBONATE SCH MLS/HR: 84 INJECTION, SOLUTION INTRAVENOUS at 02:07

## 2018-08-19 RX ADMIN — Medication SCH: at 21:32

## 2018-08-19 RX ADMIN — NOREPINEPHRINE BITARTRATE SCH MLS/HR: 16 INJECTION, SOLUTION INTRAVENOUS at 15:27

## 2018-08-19 RX ADMIN — Medication SCH ML: at 13:19

## 2018-08-19 RX ADMIN — IPRATROPIUM BROMIDE AND ALBUTEROL SULFATE SCH AMPUL: .5; 3 SOLUTION RESPIRATORY (INHALATION) at 20:00

## 2018-08-19 RX ADMIN — NOREPINEPHRINE BITARTRATE SCH MLS/HR: 16 INJECTION, SOLUTION INTRAVENOUS at 07:49

## 2018-08-19 NOTE — XRAY REPORT
FINAL REPORT



EXAM:  XR ABDOMEN 1V AP



HISTORY:  Dobhoff Placement Evaluation 



COMPARISONS:  08/18/2018



FINDINGS:  

Portable supine abdominal radiograph 



Dobhoff tube is changed in location and orientation compared to

prior and now extends along the expected course of the greater

curvature of the stomach with tip oriented superiorly in the

region of the pylorus. No pneumoperitoneum. Sequela of open-heart

surgery. A central line terminates near the superior cavoatrial

junction. 



IMPRESSION:  

Orientation and location of the Dobhoff tube is changed since

08/18/2018 and tip is now oriented superiorly in the region of

the pylorus.

## 2018-08-19 NOTE — PROGRESS NOTE
Assessment and Plan





1. Acute kidney injury:


FELICIA superimposed on CKD in the setting of volume depletion, hypotension and 

Cardiac arrest.


Creatinine leveled off.


Continue maintenance IV fluids.


Renal prognosis is guarded.


Per  no dialysis.





2. Electrolytes:


Hyperkalemia, improved.


Anion gap metabolic acidosis, on IV Sodium bicarbonate drip.





3. S/p PEA.





4. Hypotension:


On Levophed.





5. Sepsis.





6. A.fib.





7. Anemia.





Subjective


Date of service: 08/19/18


Principal diagnosis: abd pain, sigmoid colon thickening


Interval history: 


Patient was seen and examined at the bedside.





Objective





- Vital Signs


Vital signs: 


 Vital Signs - 12hr











  08/18/18 08/18/18 08/18/18





  22:00 23:00 23:17


 


Temperature   98 F


 


Pulse Rate 90 91 H 


 


Respiratory 17 12 





Rate   


 


Blood Pressure 133/54 136/57 


 


O2 Sat by Pulse 99 95 





Oximetry   














  08/18/18 08/18/18 08/19/18





  23:23 23:45 00:00


 


Temperature   


 


Pulse Rate 90 92 H 94 H


 


Respiratory 10 L  18





Rate   


 


Blood Pressure 130/55 89/67 111/51


 


O2 Sat by Pulse 100 100 97





Oximetry   














  08/19/18 08/19/18 08/19/18





  01:00 02:00 03:01


 


Temperature   


 


Pulse Rate 89 84 91 H


 


Respiratory 12 24 16





Rate   


 


Blood Pressure 113/65 126/55 118/65


 


O2 Sat by Pulse 86 100 99





Oximetry   














  08/19/18 08/19/18 08/19/18





  03:43 04:00 05:00


 


Temperature  97.5 F L 


 


Pulse Rate 93 H 91 H 89


 


Respiratory  15 16





Rate   


 


Blood Pressure 120/53 120/63 136/53


 


O2 Sat by Pulse 94 99 89





Oximetry   














  08/19/18 08/19/18 08/19/18





  06:00 07:00 08:00


 


Temperature   98.2 F


 


Pulse Rate 91 H 91 H 91 H


 


Respiratory 19 18 15





Rate   


 


Blood Pressure 130/76 117/52 111/47


 


O2 Sat by Pulse 98 97 97





Oximetry   














- General Appearance


General appearance: well-developed, appears stated age, intubated, other (on 

vent)


EENT: ATNC, sclerotic cornea (left eye)


Neck: supple


Respiratory: Present: Clear to Ascultation


Cardiology: irregularly irregular, S1S2


Gastrointestinal: normoactive bowel sounds, no tenderness


Integumentary: no rash, warm and dry


Neurologic: other (alert, grimaces)


Musculoskeletal: other (no edema)





- Lab





 08/19/18 04:30





 08/19/18 04:30


 Most recent lab results











Calcium  6.6 mg/dL (8.4-10.2)  L  08/19/18  04:30    


 


Magnesium  2.20 mg/dL (1.7-2.3)   08/17/18  06:17    


 


Urine Creatinine  47.8 mg/dL (0.1-20.0)  H  08/16/18  11:45    


 


Urine Sodium  27 mmol/L  08/16/18  11:45

## 2018-08-19 NOTE — PROGRESS NOTE
Assessment and Plan








82 y/o female transferred to ICU POST cardiac arrest from floor.  Currently 

intubated, not on sedation.  Patient is orally intubated.  Currently on 

vasopressor therapy. Family at bedside.  They have made patient a DNR.  She is 

hypothermic.  She is also hyperkalemic.  Per report she went bradycardic on the 

floor.  


Improving neuro status and improving blood pressure.  CK D, metabolic acidosis 

secondary to hypotension and renal failure





Recommendation:


Continue with current medical therapy.


Wean off vasopressors as tolerated.  


Continue with antibiotics


Continue with a bicarbonate drip for metabolic acidosis


Continue with mechanical ventilator.  This metabolic acidosis is corrected will 

start weaning process most likely tomorrow


Total critical care time was 31 minute





Subjective


Date of service: 08/19/18


Principal diagnosis: abd pain, sigmoid colon thickening


Interval history: 





82 y/o female transferred to ICU POST cardiac arrest from floor.  Currently 

intubated, not on sedation.  Patient is orally intubated.  Currently on 

vasopressor therapy. Family at bedside.  They have made patient a DNR.  She is 

hypothermic.  She is also hyperkalemic.  Per report she went bradycardic on the 

floor.  


Patient seems to be doing better.  Still on vasopressors but blood pressure is 

improving.  Patient is awake and responsive not able to move right side.








Objective


 Vital Signs - 12hr











  08/19/18 08/19/18 08/19/18





  03:43 04:00 05:00


 


Temperature  97.5 F L 


 


Pulse Rate 93 H 91 H 89


 


Pulse Rate [   





Apical]   


 


Respiratory  15 16





Rate   


 


Blood Pressure 120/53 120/63 136/53


 


O2 Sat by Pulse 94 99 89





Oximetry   














  08/19/18 08/19/18 08/19/18





  06:00 07:00 08:00


 


Temperature   98.2 F


 


Pulse Rate 91 H 91 H 91 H


 


Pulse Rate [   





Apical]   


 


Respiratory 19 18 15





Rate   


 


Blood Pressure 130/76 117/52 111/47


 


O2 Sat by Pulse 98 97 97





Oximetry   














  08/19/18 08/19/18 08/19/18





  08:55 09:00 10:00


 


Temperature   


 


Pulse Rate  91 H 94 H


 


Pulse Rate [   94 H





Apical]   


 


Respiratory  13 17





Rate   


 


Blood Pressure 118/48 127/52 109/45


 


O2 Sat by Pulse 97 80 L 100





Oximetry   














  08/19/18 08/19/18 08/19/18





  11:01 12:00 13:00


 


Temperature   


 


Pulse Rate 89 84 83


 


Pulse Rate [   





Apical]   


 


Respiratory 20 19 16





Rate   


 


Blood Pressure 104/62 115/62 109/51


 


O2 Sat by Pulse 92 85 95





Oximetry   














  08/19/18 08/19/18 08/19/18





  13:24 14:00 14:01


 


Temperature   


 


Pulse Rate 89  90


 


Pulse Rate [  91 H 





Apical]   


 


Respiratory  19 17





Rate   


 


Blood Pressure 104/62  101/69


 


O2 Sat by Pulse 98 98 94





Oximetry   











Constitutional: no acute distress, other (awake and responsive, follows some 

commands)


Eyes: non-icteric


ENT: other (orally intubated and not on sedation)


Neck: supple


Ascultation: Bilateral: clear


Percussion: Bilateral: not dull


Cardiovascular: irregular rhythm


Gastrointestinal: soft, non-tender, non-distended


Extremities: no cyanosis, no edema, pulses normal (diminished in lower extremity

), cool, cyanosis (of the toes)


Neurologic: unable to assess


CBC and BMP: 


 08/19/18 04:30





 08/19/18 04:30


ABG, PT/INR, D-dimer: 


ABG











POC ABG pH  7.274  (7.35-7.45)  L  08/19/18  04:00    


 


POC ABG pCO2  33.6  (35-45)  L  08/19/18  04:00    


 


POC ABG pO2  129  ()  H  08/19/18  04:00    


 


POC ABG HCO3  15.6   08/19/18  04:00    


 


POC ABG Total CO2  17   08/19/18  04:00    


 


POC ABG O2 Sat  99   08/19/18  04:00    








Abnormal lab findings: 


 Abnormal Labs











  08/15/18 08/15/18 08/15/18





  14:01 15:05 15:05


 


WBC   18.7 H 


 


RBC   3.52 L 


 


Hgb   


 


Hct   


 


RDW   15.6 H 


 


Lymph % (Auto)   


 


Mono #   


 


Seg Neutrophils %   


 


Seg Neuts % (Manual)   87.0 H 


 


Lymphocytes % (Manual)   9.0 L 


 


Seg Neutrophils #   


 


Seg Neutrophils # Man   16.3 H 


 


Lymphocytes # (Manual)   


 


Monocytes # (Manual)   


 


POC ABG pH   


 


POC ABG pCO2   


 


POC ABG pO2   


 


Sodium    128 L


 


Potassium   


 


Chloride    89.6 L


 


Carbon Dioxide    20 L


 


BUN    54 H


 


Creatinine    4.9 H


 


Glucose    121 H


 


POC Glucose  153 H  


 


Calcium   


 


Total Creatine Kinase    387 H


 


Troponin T    0.053 H


 


Total Protein   


 


Albumin    2.8 L


 


LDL Cholesterol Direct    27 L


 


HDL Cholesterol    32 L


 


TSH   


 


Urine Creatinine   














  08/15/18 08/15/18 08/16/18





  15:05 21:45 02:51


 


WBC   


 


RBC   


 


Hgb   


 


Hct   


 


RDW   


 


Lymph % (Auto)   


 


Mono #   


 


Seg Neutrophils %   


 


Seg Neuts % (Manual)   


 


Lymphocytes % (Manual)   


 


Seg Neutrophils #   


 


Seg Neutrophils # Man   


 


Lymphocytes # (Manual)   


 


Monocytes # (Manual)   


 


POC ABG pH   


 


POC ABG pCO2   


 


POC ABG pO2   


 


Sodium    129 L


 


Potassium   


 


Chloride    95.0 L


 


Carbon Dioxide    17 L


 


BUN    57 H


 


Creatinine    4.8 H


 


Glucose    131 H


 


POC Glucose   235 H 


 


Calcium    7.4 L


 


Total Creatine Kinase   


 


Troponin T   


 


Total Protein   


 


Albumin   


 


LDL Cholesterol Direct   


 


HDL Cholesterol   


 


TSH  7.530 H  


 


Urine Creatinine   














  08/16/18 08/16/18 08/16/18





  06:53 06:59 07:33


 


WBC   


 


RBC   


 


Hgb   


 


Hct   


 


RDW   


 


Lymph % (Auto)   


 


Mono #   


 


Seg Neutrophils %   


 


Seg Neuts % (Manual)   


 


Lymphocytes % (Manual)   


 


Seg Neutrophils #   


 


Seg Neutrophils # Man   


 


Lymphocytes # (Manual)   


 


Monocytes # (Manual)   


 


POC ABG pH   


 


POC ABG pCO2   


 


POC ABG pO2   


 


Sodium   


 


Potassium   


 


Chloride   


 


Carbon Dioxide   


 


BUN   


 


Creatinine   


 


Glucose   228 H 


 


POC Glucose  < 40 L   184 H


 


Calcium   


 


Total Creatine Kinase   


 


Troponin T   


 


Total Protein   


 


Albumin   


 


LDL Cholesterol Direct   


 


HDL Cholesterol   


 


TSH   


 


Urine Creatinine   














  08/16/18 08/16/18 08/16/18





  10:59 11:45 16:48


 


WBC   


 


RBC   


 


Hgb   


 


Hct   


 


RDW   


 


Lymph % (Auto)   


 


Mono #   


 


Seg Neutrophils %   


 


Seg Neuts % (Manual)   


 


Lymphocytes % (Manual)   


 


Seg Neutrophils #   


 


Seg Neutrophils # Man   


 


Lymphocytes # (Manual)   


 


Monocytes # (Manual)   


 


POC ABG pH   


 


POC ABG pCO2   


 


POC ABG pO2   


 


Sodium   


 


Potassium   


 


Chloride   


 


Carbon Dioxide   


 


BUN   


 


Creatinine   


 


Glucose   


 


POC Glucose  234 H   117 H


 


Calcium   


 


Total Creatine Kinase   


 


Troponin T   


 


Total Protein   


 


Albumin   


 


LDL Cholesterol Direct   


 


HDL Cholesterol   


 


TSH   


 


Urine Creatinine   47.8 H 














  08/17/18 08/17/18 08/17/18





  06:17 06:17 07:40


 


WBC  12.8 H  


 


RBC  3.01 L  


 


Hgb  8.8 L  


 


Hct  27.4 L  


 


RDW  16.4 H  


 


Lymph % (Auto)   


 


Mono #   


 


Seg Neutrophils %   


 


Seg Neuts % (Manual)  86.0 H  


 


Lymphocytes % (Manual)  7.0 L  


 


Seg Neutrophils #   


 


Seg Neutrophils # Man  11.0 H  


 


Lymphocytes # (Manual)  0.9 L  


 


Monocytes # (Manual)  0.9 H  


 


POC ABG pH   


 


POC ABG pCO2   


 


POC ABG pO2   


 


Sodium   132 L 


 


Potassium   


 


Chloride   94.7 L 


 


Carbon Dioxide   12 L 


 


BUN   63 H 


 


Creatinine   5.6 H 


 


Glucose   225 H 


 


POC Glucose    241 H


 


Calcium   7.1 L 


 


Total Creatine Kinase   519 H 


 


Troponin T   


 


Total Protein   4.8 L D 


 


Albumin   2.0 L 


 


LDL Cholesterol Direct   


 


HDL Cholesterol   


 


TSH   


 


Urine Creatinine   














  08/17/18 08/17/18 08/18/18





  12:12 17:15 05:21


 


WBC   


 


RBC   


 


Hgb   


 


Hct   


 


RDW   


 


Lymph % (Auto)   


 


Mono #   


 


Seg Neutrophils %   


 


Seg Neuts % (Manual)   


 


Lymphocytes % (Manual)   


 


Seg Neutrophils #   


 


Seg Neutrophils # Man   


 


Lymphocytes # (Manual)   


 


Monocytes # (Manual)   


 


POC ABG pH   


 


POC ABG pCO2   


 


POC ABG pO2   


 


Sodium   


 


Potassium   


 


Chloride   


 


Carbon Dioxide   


 


BUN   


 


Creatinine   


 


Glucose   


 


POC Glucose  237 H  165 H  318 H


 


Calcium   


 


Total Creatine Kinase   


 


Troponin T   


 


Total Protein   


 


Albumin   


 


LDL Cholesterol Direct   


 


HDL Cholesterol   


 


TSH   


 


Urine Creatinine   














  08/18/18 08/18/18 08/18/18





  06:28 07:17 08:34


 


WBC   


 


RBC   


 


Hgb   


 


Hct   


 


RDW   


 


Lymph % (Auto)   


 


Mono #   


 


Seg Neutrophils %   


 


Seg Neuts % (Manual)   


 


Lymphocytes % (Manual)   


 


Seg Neutrophils #   


 


Seg Neutrophils # Man   


 


Lymphocytes # (Manual)   


 


Monocytes # (Manual)   


 


POC ABG pH    7.037 L


 


POC ABG pCO2    31.9 L


 


POC ABG pO2    303 H


 


Sodium  136 L  


 


Potassium  6.2 H* D  


 


Chloride   


 


Carbon Dioxide  7 L*  


 


BUN  72 H  


 


Creatinine  6.3 H  


 


Glucose  253 H  


 


POC Glucose   292 H 


 


Calcium  7.1 L  


 


Total Creatine Kinase   


 


Troponin T   


 


Total Protein   


 


Albumin   


 


LDL Cholesterol Direct   


 


HDL Cholesterol   


 


TSH   


 


Urine Creatinine   














  08/18/18 08/18/18 08/18/18





  09:37 11:57 12:33


 


WBC   


 


RBC   


 


Hgb   


 


Hct   


 


RDW   


 


Lymph % (Auto)   


 


Mono #   


 


Seg Neutrophils %   


 


Seg Neuts % (Manual)   


 


Lymphocytes % (Manual)   


 


Seg Neutrophils #   


 


Seg Neutrophils # Man   


 


Lymphocytes # (Manual)   


 


Monocytes # (Manual)   


 


POC ABG pH   


 


POC ABG pCO2   


 


POC ABG pO2   


 


Sodium   


 


Potassium   


 


Chloride   


 


Carbon Dioxide   


 


BUN   


 


Creatinine   


 


Glucose   


 


POC Glucose  414 H  379 H 


 


Calcium   


 


Total Creatine Kinase   


 


Troponin T    0.206 H* D


 


Total Protein   


 


Albumin   


 


LDL Cholesterol Direct   


 


HDL Cholesterol   


 


TSH   


 


Urine Creatinine   














  08/18/18 08/18/18 08/18/18





  12:33 14:23 17:34


 


WBC   


 


RBC   


 


Hgb   


 


Hct   


 


RDW   


 


Lymph % (Auto)   


 


Mono #   


 


Seg Neutrophils %   


 


Seg Neuts % (Manual)   


 


Lymphocytes % (Manual)   


 


Seg Neutrophils #   


 


Seg Neutrophils # Man   


 


Lymphocytes # (Manual)   


 


Monocytes # (Manual)   


 


POC ABG pH   


 


POC ABG pCO2   


 


POC ABG pO2   


 


Sodium  132 L   134 L


 


Potassium  5.9 H  


 


Chloride   


 


Carbon Dioxide  11 L   14 L


 


BUN  73 H   71 H


 


Creatinine  6.1 H   6.0 H


 


Glucose  336 H   225 H


 


POC Glucose   306 H 


 


Calcium  7.0 L   6.8 L


 


Total Creatine Kinase   


 


Troponin T   


 


Total Protein   


 


Albumin   


 


LDL Cholesterol Direct   


 


HDL Cholesterol   


 


TSH   


 


Urine Creatinine   














  08/19/18 08/19/18 08/19/18





  04:00 04:30 04:30


 


WBC    19.3 H


 


RBC    3.05 L


 


Hgb    8.8 L


 


Hct    27.1 L


 


RDW    16.6 H


 


Lymph % (Auto)    7.9 L


 


Mono #    1.2 H


 


Seg Neutrophils %    85.2 H


 


Seg Neuts % (Manual)   


 


Lymphocytes % (Manual)   


 


Seg Neutrophils #    16.4 H


 


Seg Neutrophils # Man   


 


Lymphocytes # (Manual)   


 


Monocytes # (Manual)   


 


POC ABG pH  7.274 L  


 


POC ABG pCO2  33.6 L  


 


POC ABG pO2  129 H  


 


Sodium   


 


Potassium   


 


Chloride   


 


Carbon Dioxide   15 L 


 


BUN   71 H 


 


Creatinine   5.8 H 


 


Glucose   135 H 


 


POC Glucose   


 


Calcium   6.6 L 


 


Total Creatine Kinase   


 


Troponin T   


 


Total Protein   


 


Albumin   


 


LDL Cholesterol Direct   


 


HDL Cholesterol   


 


TSH   


 


Urine Creatinine   














  08/19/18





  07:52


 


WBC 


 


RBC 


 


Hgb 


 


Hct 


 


RDW 


 


Lymph % (Auto) 


 


Mono # 


 


Seg Neutrophils % 


 


Seg Neuts % (Manual) 


 


Lymphocytes % (Manual) 


 


Seg Neutrophils # 


 


Seg Neutrophils # Man 


 


Lymphocytes # (Manual) 


 


Monocytes # (Manual) 


 


POC ABG pH 


 


POC ABG pCO2 


 


POC ABG pO2 


 


Sodium 


 


Potassium 


 


Chloride 


 


Carbon Dioxide 


 


BUN 


 


Creatinine 


 


Glucose 


 


POC Glucose  191 H


 


Calcium 


 


Total Creatine Kinase 


 


Troponin T 


 


Total Protein 


 


Albumin 


 


LDL Cholesterol Direct 


 


HDL Cholesterol 


 


TSH 


 


Urine Creatinine 











Chest x-ray: image reviewed (some chronic changes possible interstitial noted)

## 2018-08-19 NOTE — XRAY REPORT
FINAL REPORT



EXAM:  XR ABDOMEN 1V AP



HISTORY:  Dobhoff Placement 



TECHNIQUE:  A repeat portable AP view of the abdomen pelvis was

obtained and compared to the earlier study of 08/18/2018.



FINDINGS:  

The tip of the Dobhoff tube is unchanged in position in the

gastric fundus. The lung bases are clear. The bowel gas pattern

reveals moderate distention of colonic loops. Free air is not

seen. The skeletal structures otherwise are unchanged.



IMPRESSION:  

The tip of the Dobhoff tube is unchanged in position in the

gastric fundus.

## 2018-08-19 NOTE — PROGRESS NOTE
Assessment and Plan





/Cardiac arrest s/p CPR


Continue current management, cardiology following





/Hyperkalemia; Likely from CKD


s/p calcium gluconate, insulin and IV D50


Nephrology following





/Acute hypoxic respiratory failure; intubated on ventilatory support


Nebulizers, wean as tolerated and extubate, pulmonary critical consulted 





/History of COPD/?interstitial lung disease;





/Hypotension/septic shock; 


IV fluids, placed on Levophed per protocol, titrate blood pressures , systolic 

more than 100


Off Levophed today





/Sepsis ; likely from colitis vs PNA


Continue empiric antibiotics, negative blood urine cultures, IV fluids and 

supportive care


Sputun cx positive for candida, will place on diflucan





/DM type 2, cont on insulin





/Failure to thrive in an adult; 


Presented with Poor oral intake, no appetite, 


Will start on feeding per TF protocol 





/Severe malnutrition: Nutrition supplements, nutrition consult ,supportive care





/Acute kidney injury; secondary to ATN


Worsening renal function, IV fluids, bicarbonate, d/w nephrology 





/History of coronary artery disease status post CABG; recent negative stress 

test


Continue current cardiac medications, cardiology following





/Atrial fibrillation; continue beta blockers,


No indication for anticoagulation[less than 12 hours of A. fib] now in sinus 

Rhythm





/Hypothyroidism; continue Synthroid, thyroid panel reviewed





/Abdominal pain with nausea ;


CT abdomen; thickening of the sigmoid colon, GI advised C. difficile studies


on Flagyl, possible colonoscopy in the future as needed





/DVT prophylaxis; SCDs, Lovenox renal dose





Patient is critically ill with poor prognosis


Patient's condition, recent events, poor prognosis discussed in detail with 

 and her daughter, They requested DO NOT RESUSCITATE, and to continue 

full treatment.





Patient is DNR status





Critical care time 35 minutes








The high probability of a clinically significant, sudden or life threatening 

deterioration of the [multiple] system(s) required my full and direct attention

, intervention and personal management. The aggregate critical care time was [35

] minutes. This time is in addition to time spent performing reported 

procedures but includes the following: 





[x] Data Review and interpretation 





[x] Patient assessment and monitoring of vital signs 





[x] Documentation 





[x] Medication orders and management








Brief history: 


89-year-old very frail and malnourished and cachectic female patient with 

history of coronary artery disease status post CABG, peripheral vascular 

disease chronic renal insufficiency chronic back pain, was admitted through 

emergency room with altered level of consciousness, poor oral intake and 

failure to thrive, acute on chronic kidney disease, and sepsis by protocol, 

atrial fib with RVR. Had cardiac arrest on 08/18/18 morning status post CPR and 

intubation. Patient had severe bradycardia and noted PEA on monitor, CODE BLUE 

was called, intubated and CPR was done per ACLS protocol by ER physician Dr. Casper,Transferred to ICU. Her k level was also noted 6.2.











 Hospitalist Physical 





General appearance: Present:  cachectic, disheveled, intubated





- EENT


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





- Neck


Neck: Present: supple, other (teaching in place)





- Respiratory


Respiratory effort: labored


Respiratory: bilateral: diminished, rhonchi, negative: rales, wheezing





- Cardiovascular


Rhythm: regular


Heart Sounds: Present: S1 & S2





- Extremities


Extremities: no ischemia


Extremity abnormal: edema (trace edema)





- Abdominal


General gastrointestinal: soft, non-tender, non-distended, normal bowel sounds





- Integumentary


Integumentary: Present: clear, warm





- Psychiatric


Psychiatric: other (intubated unresponsive)





- Neurologic


Neurologic: other (intubated Unresponsive)








Subjective


Date of service: 08/19/18


Principal diagnosis: abd pain, sigmoid colon thickening


Interval history: 





Patient is orally intubated on ventilatory support


Critically ill-looking, pale, cachectic and malnourished


Vital signs reviewed, discussed with family and RN at bedside











Objective





- Constitutional


Vitals: 


 Vital Signs - 12hr











  08/19/18 08/19/18 08/19/18





  01:00 02:00 03:01


 


Temperature   


 


Pulse Rate 89 84 91 H


 


Pulse Rate [   





Apical]   


 


Respiratory 12 24 16





Rate   


 


Blood Pressure 113/65 126/55 118/65


 


O2 Sat by Pulse 86 100 99





Oximetry   














  08/19/18 08/19/18 08/19/18





  03:43 04:00 05:00


 


Temperature  97.5 F L 


 


Pulse Rate 93 H 91 H 89


 


Pulse Rate [   





Apical]   


 


Respiratory  15 16





Rate   


 


Blood Pressure 120/53 120/63 136/53


 


O2 Sat by Pulse 94 99 89





Oximetry   














  08/19/18 08/19/18 08/19/18





  06:00 07:00 08:00


 


Temperature   98.2 F


 


Pulse Rate 91 H 91 H 91 H


 


Pulse Rate [   





Apical]   


 


Respiratory 19 18 15





Rate   


 


Blood Pressure 130/76 117/52 111/47


 


O2 Sat by Pulse 98 97 97





Oximetry   














  08/19/18 08/19/18 08/19/18





  08:55 09:00 10:00


 


Temperature   


 


Pulse Rate  91 H 94 H


 


Pulse Rate [   94 H





Apical]   


 


Respiratory  13 17





Rate   


 


Blood Pressure 118/48 127/52 109/45


 


O2 Sat by Pulse 97 80 L 100





Oximetry   














  08/19/18





  11:01


 


Temperature 


 


Pulse Rate 89


 


Pulse Rate [ 





Apical] 


 


Respiratory 20





Rate 


 


Blood Pressure 104/62


 


O2 Sat by Pulse 92





Oximetry 














- Labs


CBC & Chem 7: 


 08/19/18 04:30





 08/19/18 04:30


Labs: 


 Abnormal lab results











  08/18/18 08/18/18 08/18/18 Range/Units





  12:33 12:33 14:23 


 


WBC     (4.5-11.0)  K/mm3


 


RBC     (3.65-5.03)  M/mm3


 


Hgb     (10.1-14.3)  gm/dl


 


Hct     (30.3-42.9)  %


 


RDW     (13.2-15.2)  %


 


Lymph % (Auto)     (13.4-35.0)  %


 


Mono #     (0.0-0.8)  K/mm3


 


Seg Neutrophils %     (40.0-70.0)  %


 


Seg Neutrophils #     (1.8-7.7)  K/mm3


 


POC ABG pH     (7.35-7.45)  


 


POC ABG pCO2     (35-45)  


 


POC ABG pO2     ()  


 


Sodium   132 L   (137-145)  mmol/L


 


Potassium   5.9 H   (3.6-5.0)  mmol/L


 


Carbon Dioxide   11 L   (22-30)  mmol/L


 


BUN   73 H   (7-17)  mg/dL


 


Creatinine   6.1 H   (0.7-1.2)  mg/dL


 


Glucose   336 H   ()  mg/dL


 


POC Glucose    306 H  ()  


 


Calcium   7.0 L   (8.4-10.2)  mg/dL


 


Troponin T  0.206 H* D    (0.00-0.029)  ng/mL














  08/18/18 08/19/18 08/19/18 Range/Units





  17:34 04:00 04:30 


 


WBC     (4.5-11.0)  K/mm3


 


RBC     (3.65-5.03)  M/mm3


 


Hgb     (10.1-14.3)  gm/dl


 


Hct     (30.3-42.9)  %


 


RDW     (13.2-15.2)  %


 


Lymph % (Auto)     (13.4-35.0)  %


 


Mono #     (0.0-0.8)  K/mm3


 


Seg Neutrophils %     (40.0-70.0)  %


 


Seg Neutrophils #     (1.8-7.7)  K/mm3


 


POC ABG pH   7.274 L   (7.35-7.45)  


 


POC ABG pCO2   33.6 L   (35-45)  


 


POC ABG pO2   129 H   ()  


 


Sodium  134 L    (137-145)  mmol/L


 


Potassium     (3.6-5.0)  mmol/L


 


Carbon Dioxide  14 L   15 L  (22-30)  mmol/L


 


BUN  71 H   71 H  (7-17)  mg/dL


 


Creatinine  6.0 H   5.8 H  (0.7-1.2)  mg/dL


 


Glucose  225 H   135 H  ()  mg/dL


 


POC Glucose     ()  


 


Calcium  6.8 L   6.6 L  (8.4-10.2)  mg/dL


 


Troponin T     (0.00-0.029)  ng/mL














  08/19/18 08/19/18 Range/Units





  04:30 07:52 


 


WBC  19.3 H   (4.5-11.0)  K/mm3


 


RBC  3.05 L   (3.65-5.03)  M/mm3


 


Hgb  8.8 L   (10.1-14.3)  gm/dl


 


Hct  27.1 L   (30.3-42.9)  %


 


RDW  16.6 H   (13.2-15.2)  %


 


Lymph % (Auto)  7.9 L   (13.4-35.0)  %


 


Mono #  1.2 H   (0.0-0.8)  K/mm3


 


Seg Neutrophils %  85.2 H   (40.0-70.0)  %


 


Seg Neutrophils #  16.4 H   (1.8-7.7)  K/mm3


 


POC ABG pH    (7.35-7.45)  


 


POC ABG pCO2    (35-45)  


 


POC ABG pO2    ()  


 


Sodium    (137-145)  mmol/L


 


Potassium    (3.6-5.0)  mmol/L


 


Carbon Dioxide    (22-30)  mmol/L


 


BUN    (7-17)  mg/dL


 


Creatinine    (0.7-1.2)  mg/dL


 


Glucose    ()  mg/dL


 


POC Glucose   191 H  ()  


 


Calcium    (8.4-10.2)  mg/dL


 


Troponin T    (0.00-0.029)  ng/mL

## 2018-08-20 LAB
BUN SERPL-MCNC: 72 MG/DL (ref 7–17)
BUN/CREAT SERPL: 13 %
CALCIUM SERPL-MCNC: 6.1 MG/DL (ref 8.4–10.2)
HEMOLYSIS INDEX: 17

## 2018-08-20 RX ADMIN — Medication SCH EACH: at 21:56

## 2018-08-20 RX ADMIN — Medication SCH ML: at 11:23

## 2018-08-20 RX ADMIN — MEMANTINE SCH MG: 5 TABLET ORAL at 21:57

## 2018-08-20 RX ADMIN — IPRATROPIUM BROMIDE AND ALBUTEROL SULFATE SCH AMPUL: .5; 3 SOLUTION RESPIRATORY (INHALATION) at 09:20

## 2018-08-20 RX ADMIN — LEVOTHYROXINE SODIUM SCH: 0.05 TABLET ORAL at 11:46

## 2018-08-20 RX ADMIN — GUAIFENESIN SCH MG: 600 TABLET ORAL at 21:55

## 2018-08-20 RX ADMIN — FLUCONAZOLE, SODIUM CHLORIDE SCH: 2 INJECTION INTRAVENOUS at 11:48

## 2018-08-20 RX ADMIN — IPRATROPIUM BROMIDE AND ALBUTEROL SULFATE SCH AMPUL: .5; 3 SOLUTION RESPIRATORY (INHALATION) at 19:42

## 2018-08-20 RX ADMIN — Medication SCH EACH: at 11:21

## 2018-08-20 RX ADMIN — ENOXAPARIN SODIUM SCH MG: 100 INJECTION SUBCUTANEOUS at 21:56

## 2018-08-20 RX ADMIN — MULTIVITAMIN TABLET SCH EACH: TABLET at 11:20

## 2018-08-20 RX ADMIN — ASPIRIN SCH MG: 81 TABLET, CHEWABLE ORAL at 21:56

## 2018-08-20 RX ADMIN — GUAIFENESIN SCH MG: 600 TABLET ORAL at 11:21

## 2018-08-20 RX ADMIN — Medication SCH ML: at 21:58

## 2018-08-20 RX ADMIN — NOREPINEPHRINE BITARTRATE SCH MLS/HR: 16 INJECTION, SOLUTION INTRAVENOUS at 07:00

## 2018-08-20 RX ADMIN — FAMOTIDINE SCH MG: 20 TABLET ORAL at 11:19

## 2018-08-20 RX ADMIN — LORATADINE SCH MG: 10 TABLET ORAL at 11:19

## 2018-08-20 RX ADMIN — METRONIDAZOLE SCH MLS/HR: 5 INJECTION, SOLUTION INTRAVENOUS at 13:10

## 2018-08-20 RX ADMIN — METRONIDAZOLE SCH MLS/HR: 5 INJECTION, SOLUTION INTRAVENOUS at 06:18

## 2018-08-20 RX ADMIN — NOREPINEPHRINE BITARTRATE SCH MLS/HR: 16 INJECTION, SOLUTION INTRAVENOUS at 18:47

## 2018-08-20 RX ADMIN — SODIUM BICARBONATE SCH MLS/HR: 84 INJECTION, SOLUTION INTRAVENOUS at 06:17

## 2018-08-20 RX ADMIN — EZETIMIBE SCH MG: 10 TABLET ORAL at 11:19

## 2018-08-20 RX ADMIN — METRONIDAZOLE SCH MLS/HR: 5 INJECTION, SOLUTION INTRAVENOUS at 21:59

## 2018-08-20 RX ADMIN — MEMANTINE SCH MG: 5 TABLET ORAL at 11:20

## 2018-08-20 RX ADMIN — IPRATROPIUM BROMIDE AND ALBUTEROL SULFATE SCH AMPUL: .5; 3 SOLUTION RESPIRATORY (INHALATION) at 14:55

## 2018-08-20 NOTE — PROGRESS NOTE
Assessment and Plan





1. Acute kidney injury:


FELICIA superimposed on CKD in the setting of volume depletion, hypotension and 

Cardiac arrest.


Slight improvement in the Creatinine level.


Continue maintenance IV fluids.


Renal prognosis is guarded.


No dialysis per family.





2. Electrolytes:


Hyperkalemia, improved.


Anion gap metabolic acidosis, was on IV Sodium bicarbonate drip.





3. S/p PEA.





4. Hypotension:


On Levophed.





5. Respiratory failure:


On vent.





6. Sepsis.





7. A.fib.





8. Anemia.





Subjective


Date of service: 08/20/18


Principal diagnosis: abd pain, sigmoid colon thickening


Interval history: 


Patient was seen and examined at the bedside.





Objective





- Vital Signs


Vital signs: 


 Vital Signs - 12hr











  08/19/18 08/19/18 08/19/18





  21:01 22:00 23:00


 


Temperature   


 


Pulse Rate 98 H 89 94 H


 


Pulse Rate [   





Apical]   


 


Respiratory 15 21 24





Rate   


 


Blood Pressure 112/51 105/53 92/53


 


O2 Sat by Pulse 95 96 99





Oximetry   














  08/19/18 08/19/18 08/19/18





  23:27 23:45 23:51


 


Temperature   98.2 F


 


Pulse Rate 94 H 98 H 


 


Pulse Rate [   





Apical]   


 


Respiratory  12 





Rate   


 


Blood Pressure 82/60 111/51 


 


O2 Sat by Pulse 95 93 





Oximetry   














  08/20/18 08/20/18 08/20/18





  00:00 01:00 02:00


 


Temperature   


 


Pulse Rate 86 95 H 94 H


 


Pulse Rate [ 91 H  





Apical]   


 


Respiratory 17 16 17





Rate   


 


Blood Pressure 115/52 115/52 113/51


 


O2 Sat by Pulse 93 96 94





Oximetry   














  08/20/18 08/20/18 08/20/18





  03:00 03:16 04:00


 


Temperature  97.8 F 


 


Pulse Rate 87  87


 


Pulse Rate [   90





Apical]   


 


Respiratory 18  17





Rate   


 


Blood Pressure 112/52  107/48


 


O2 Sat by Pulse 97  90





Oximetry   














  08/20/18 08/20/18 08/20/18





  05:00 06:00 07:00


 


Temperature   


 


Pulse Rate 93 H 85 91 H


 


Pulse Rate [   





Apical]   


 


Respiratory 13 16 21





Rate   


 


Blood Pressure 104/50 104/51 109/47


 


O2 Sat by Pulse 90 93 85





Oximetry   














- General Appearance


General appearance: well-developed, appears stated age, intubated, other (on 

vent)


EENT: ATNC


Neck: supple


Respiratory: Present: Clear to Ascultation


Cardiology: irregularly irregular, S1S2, no murmurs


Gastrointestinal: normoactive bowel sounds, no tenderness


Integumentary: no rash


Neurologic: other (slight grimace)


Musculoskeletal: other (bilateral UE edema noted)





- Lab





 08/19/18 04:30





 08/20/18 06:55


 Most recent lab results











Calcium  6.1 mg/dL (8.4-10.2)  L  08/20/18  06:55    


 


Magnesium  2.20 mg/dL (1.7-2.3)   08/17/18  06:17    


 


Urine Creatinine  47.8 mg/dL (0.1-20.0)  H  08/16/18  11:45    


 


Urine Sodium  27 mmol/L  08/16/18  11:45

## 2018-08-20 NOTE — PROGRESS NOTE
Assessment and Plan








PEA/cardiac arrest


Acute respiratory failure 


hypoxemic encephalopathy











Recommendations





f/u hospital ventilator bundle,


Mechanical ventilation support, adjust FiO2 with goal of maintaining oximetry 

at or above 92%


Titrate PEEP up to maintain oximetry of the above oximetry level


Set tidal Volume set initially at 6-8 cm PBW for ANNY protection


Keep PIP < 30


Sedation as needed for patient comfort, adjust to RASS  -1 to - 3 


Maintain extubation precautions


Daily morning sedation vacation and initiate SBT if deemed appropriate


DVT prophylaxis


PPI prophylaxis














 


 Critical care time was 31 minutes of face-to-face evaluation and coordination 

of care








Subjective


Date of service: 08/20/18


Principal diagnosis: abd pain, sigmoid colon thickening


Interval history: 





Unresponsive





Objective


 Vital Signs - 12hr











  08/19/18 08/19/18 08/19/18





  22:00 23:00 23:27


 


Temperature   


 


Pulse Rate 89 94 H 94 H


 


Pulse Rate [   





Apical]   


 


Pulse Rate [   





Bilateral   





Throughout]   


 


Respiratory 21 24 





Rate   


 


Respiratory   





Rate [Bilateral   





Throughout]   


 


Blood Pressure 105/53 92/53 82/60


 


O2 Sat by Pulse 96 99 95





Oximetry   














  08/19/18 08/19/18 08/20/18





  23:45 23:51 00:00


 


Temperature  98.2 F 


 


Pulse Rate 98 H  86


 


Pulse Rate [   91 H





Apical]   


 


Pulse Rate [   





Bilateral   





Throughout]   


 


Respiratory 12  17





Rate   


 


Respiratory   





Rate [Bilateral   





Throughout]   


 


Blood Pressure 111/51  115/52


 


O2 Sat by Pulse 93  93





Oximetry   














  08/20/18 08/20/18 08/20/18





  01:00 02:00 03:00


 


Temperature   


 


Pulse Rate 95 H 94 H 87


 


Pulse Rate [   





Apical]   


 


Pulse Rate [   





Bilateral   





Throughout]   


 


Respiratory 16 17 18





Rate   


 


Respiratory   





Rate [Bilateral   





Throughout]   


 


Blood Pressure 115/52 113/51 112/52


 


O2 Sat by Pulse 96 94 97





Oximetry   














  08/20/18 08/20/18 08/20/18





  03:16 04:00 05:00


 


Temperature 97.8 F  


 


Pulse Rate  87 93 H


 


Pulse Rate [  90 





Apical]   


 


Pulse Rate [   





Bilateral   





Throughout]   


 


Respiratory  17 13





Rate   


 


Respiratory   





Rate [Bilateral   





Throughout]   


 


Blood Pressure  107/48 104/50


 


O2 Sat by Pulse  90 90





Oximetry   














  08/20/18 08/20/18 08/20/18





  06:00 07:00 08:00


 


Temperature   99.0 F


 


Pulse Rate 85 91 H 83


 


Pulse Rate [   





Apical]   


 


Pulse Rate [   





Bilateral   





Throughout]   


 


Respiratory 16 21 15





Rate   


 


Respiratory   





Rate [Bilateral   





Throughout]   


 


Blood Pressure 104/51 109/47 100/47


 


O2 Sat by Pulse 93 85 98





Oximetry   














  08/20/18 08/20/18 08/20/18





  09:00 09:15 09:20


 


Temperature   


 


Pulse Rate 92 H 93 H 


 


Pulse Rate [   





Apical]   


 


Pulse Rate [   95 H





Bilateral   





Throughout]   


 


Respiratory 14  





Rate   


 


Respiratory   16





Rate [Bilateral   





Throughout]   


 


Blood Pressure 103/46 103/46 


 


O2 Sat by Pulse 82 L 97 





Oximetry   














  08/20/18





  09:27


 


Temperature 


 


Pulse Rate 


 


Pulse Rate [ 





Apical] 


 


Pulse Rate [ 105 H





Bilateral 





Throughout] 


 


Respiratory 





Rate 


 


Respiratory 15





Rate [Bilateral 





Throughout] 


 


Blood Pressure 


 


O2 Sat by Pulse 





Oximetry 











Constitutional: no acute distress


Eyes: non-icteric


ENT: other (orally intubated and not on sedation)


Neck: supple


Ascultation: Bilateral: clear


Percussion: Bilateral: not dull


Cardiovascular: irregular rhythm


Gastrointestinal: soft, non-tender, non-distended


Extremities: no cyanosis, no edema, pulses normal (diminished in lower extremity

), cool


Neurologic: unable to assess


CBC and BMP: 


 08/19/18 04:30





 08/20/18 06:55


ABG, PT/INR, D-dimer: 


ABG











POC ABG pH  7.303  (7.35-7.45)  L  08/20/18  05:27    


 


POC ABG pCO2  30.3  (35-45)  L  08/20/18  05:27    


 


POC ABG pO2  72  ()  L  08/20/18  05:27    


 


POC ABG HCO3  15.0   08/20/18  05:27    


 


POC ABG Total CO2  16   08/20/18  05:27    


 


POC ABG O2 Sat  93   08/20/18  05:27    








Abnormal lab findings: 


 Abnormal Labs











  08/15/18 08/15/18 08/15/18





  14:01 15:05 15:05


 


WBC   18.7 H 


 


RBC   3.52 L 


 


Hgb   


 


Hct   


 


RDW   15.6 H 


 


Lymph % (Auto)   


 


Mono #   


 


Seg Neutrophils %   


 


Seg Neuts % (Manual)   87.0 H 


 


Lymphocytes % (Manual)   9.0 L 


 


Seg Neutrophils #   


 


Seg Neutrophils # Man   16.3 H 


 


Lymphocytes # (Manual)   


 


Monocytes # (Manual)   


 


POC ABG pH   


 


POC ABG pCO2   


 


POC ABG pO2   


 


Sodium    128 L


 


Potassium   


 


Chloride    89.6 L


 


Carbon Dioxide    20 L


 


BUN    54 H


 


Creatinine    4.9 H


 


Glucose    121 H


 


POC Glucose  153 H  


 


Calcium   


 


Total Creatine Kinase    387 H


 


Troponin T    0.053 H


 


Total Protein   


 


Albumin    2.8 L


 


LDL Cholesterol Direct    27 L


 


HDL Cholesterol    32 L


 


TSH   


 


Urine Creatinine   














  08/15/18 08/15/18 08/16/18





  15:05 21:45 02:51


 


WBC   


 


RBC   


 


Hgb   


 


Hct   


 


RDW   


 


Lymph % (Auto)   


 


Mono #   


 


Seg Neutrophils %   


 


Seg Neuts % (Manual)   


 


Lymphocytes % (Manual)   


 


Seg Neutrophils #   


 


Seg Neutrophils # Man   


 


Lymphocytes # (Manual)   


 


Monocytes # (Manual)   


 


POC ABG pH   


 


POC ABG pCO2   


 


POC ABG pO2   


 


Sodium    129 L


 


Potassium   


 


Chloride    95.0 L


 


Carbon Dioxide    17 L


 


BUN    57 H


 


Creatinine    4.8 H


 


Glucose    131 H


 


POC Glucose   235 H 


 


Calcium    7.4 L


 


Total Creatine Kinase   


 


Troponin T   


 


Total Protein   


 


Albumin   


 


LDL Cholesterol Direct   


 


HDL Cholesterol   


 


TSH  7.530 H  


 


Urine Creatinine   














  08/16/18 08/16/18 08/16/18





  06:53 06:59 07:33


 


WBC   


 


RBC   


 


Hgb   


 


Hct   


 


RDW   


 


Lymph % (Auto)   


 


Mono #   


 


Seg Neutrophils %   


 


Seg Neuts % (Manual)   


 


Lymphocytes % (Manual)   


 


Seg Neutrophils #   


 


Seg Neutrophils # Man   


 


Lymphocytes # (Manual)   


 


Monocytes # (Manual)   


 


POC ABG pH   


 


POC ABG pCO2   


 


POC ABG pO2   


 


Sodium   


 


Potassium   


 


Chloride   


 


Carbon Dioxide   


 


BUN   


 


Creatinine   


 


Glucose   228 H 


 


POC Glucose  < 40 L   184 H


 


Calcium   


 


Total Creatine Kinase   


 


Troponin T   


 


Total Protein   


 


Albumin   


 


LDL Cholesterol Direct   


 


HDL Cholesterol   


 


TSH   


 


Urine Creatinine   














  08/16/18 08/16/18 08/16/18





  10:59 11:45 16:48


 


WBC   


 


RBC   


 


Hgb   


 


Hct   


 


RDW   


 


Lymph % (Auto)   


 


Mono #   


 


Seg Neutrophils %   


 


Seg Neuts % (Manual)   


 


Lymphocytes % (Manual)   


 


Seg Neutrophils #   


 


Seg Neutrophils # Man   


 


Lymphocytes # (Manual)   


 


Monocytes # (Manual)   


 


POC ABG pH   


 


POC ABG pCO2   


 


POC ABG pO2   


 


Sodium   


 


Potassium   


 


Chloride   


 


Carbon Dioxide   


 


BUN   


 


Creatinine   


 


Glucose   


 


POC Glucose  234 H   117 H


 


Calcium   


 


Total Creatine Kinase   


 


Troponin T   


 


Total Protein   


 


Albumin   


 


LDL Cholesterol Direct   


 


HDL Cholesterol   


 


TSH   


 


Urine Creatinine   47.8 H 














  08/17/18 08/17/18 08/17/18





  06:17 06:17 07:40


 


WBC  12.8 H  


 


RBC  3.01 L  


 


Hgb  8.8 L  


 


Hct  27.4 L  


 


RDW  16.4 H  


 


Lymph % (Auto)   


 


Mono #   


 


Seg Neutrophils %   


 


Seg Neuts % (Manual)  86.0 H  


 


Lymphocytes % (Manual)  7.0 L  


 


Seg Neutrophils #   


 


Seg Neutrophils # Man  11.0 H  


 


Lymphocytes # (Manual)  0.9 L  


 


Monocytes # (Manual)  0.9 H  


 


POC ABG pH   


 


POC ABG pCO2   


 


POC ABG pO2   


 


Sodium   132 L 


 


Potassium   


 


Chloride   94.7 L 


 


Carbon Dioxide   12 L 


 


BUN   63 H 


 


Creatinine   5.6 H 


 


Glucose   225 H 


 


POC Glucose    241 H


 


Calcium   7.1 L 


 


Total Creatine Kinase   519 H 


 


Troponin T   


 


Total Protein   4.8 L D 


 


Albumin   2.0 L 


 


LDL Cholesterol Direct   


 


HDL Cholesterol   


 


TSH   


 


Urine Creatinine   














  08/17/18 08/17/18 08/18/18





  12:12 17:15 05:21


 


WBC   


 


RBC   


 


Hgb   


 


Hct   


 


RDW   


 


Lymph % (Auto)   


 


Mono #   


 


Seg Neutrophils %   


 


Seg Neuts % (Manual)   


 


Lymphocytes % (Manual)   


 


Seg Neutrophils #   


 


Seg Neutrophils # Man   


 


Lymphocytes # (Manual)   


 


Monocytes # (Manual)   


 


POC ABG pH   


 


POC ABG pCO2   


 


POC ABG pO2   


 


Sodium   


 


Potassium   


 


Chloride   


 


Carbon Dioxide   


 


BUN   


 


Creatinine   


 


Glucose   


 


POC Glucose  237 H  165 H  318 H


 


Calcium   


 


Total Creatine Kinase   


 


Troponin T   


 


Total Protein   


 


Albumin   


 


LDL Cholesterol Direct   


 


HDL Cholesterol   


 


TSH   


 


Urine Creatinine   














  08/18/18 08/18/18 08/18/18





  06:28 07:17 08:34


 


WBC   


 


RBC   


 


Hgb   


 


Hct   


 


RDW   


 


Lymph % (Auto)   


 


Mono #   


 


Seg Neutrophils %   


 


Seg Neuts % (Manual)   


 


Lymphocytes % (Manual)   


 


Seg Neutrophils #   


 


Seg Neutrophils # Man   


 


Lymphocytes # (Manual)   


 


Monocytes # (Manual)   


 


POC ABG pH    7.037 L


 


POC ABG pCO2    31.9 L


 


POC ABG pO2    303 H


 


Sodium  136 L  


 


Potassium  6.2 H* D  


 


Chloride   


 


Carbon Dioxide  7 L*  


 


BUN  72 H  


 


Creatinine  6.3 H  


 


Glucose  253 H  


 


POC Glucose   292 H 


 


Calcium  7.1 L  


 


Total Creatine Kinase   


 


Troponin T   


 


Total Protein   


 


Albumin   


 


LDL Cholesterol Direct   


 


HDL Cholesterol   


 


TSH   


 


Urine Creatinine   














  08/18/18 08/18/18 08/18/18





  09:37 11:57 12:33


 


WBC   


 


RBC   


 


Hgb   


 


Hct   


 


RDW   


 


Lymph % (Auto)   


 


Mono #   


 


Seg Neutrophils %   


 


Seg Neuts % (Manual)   


 


Lymphocytes % (Manual)   


 


Seg Neutrophils #   


 


Seg Neutrophils # Man   


 


Lymphocytes # (Manual)   


 


Monocytes # (Manual)   


 


POC ABG pH   


 


POC ABG pCO2   


 


POC ABG pO2   


 


Sodium   


 


Potassium   


 


Chloride   


 


Carbon Dioxide   


 


BUN   


 


Creatinine   


 


Glucose   


 


POC Glucose  414 H  379 H 


 


Calcium   


 


Total Creatine Kinase   


 


Troponin T    0.206 H* D


 


Total Protein   


 


Albumin   


 


LDL Cholesterol Direct   


 


HDL Cholesterol   


 


TSH   


 


Urine Creatinine   














  08/18/18 08/18/18 08/18/18





  12:33 14:23 17:34


 


WBC   


 


RBC   


 


Hgb   


 


Hct   


 


RDW   


 


Lymph % (Auto)   


 


Mono #   


 


Seg Neutrophils %   


 


Seg Neuts % (Manual)   


 


Lymphocytes % (Manual)   


 


Seg Neutrophils #   


 


Seg Neutrophils # Man   


 


Lymphocytes # (Manual)   


 


Monocytes # (Manual)   


 


POC ABG pH   


 


POC ABG pCO2   


 


POC ABG pO2   


 


Sodium  132 L   134 L


 


Potassium  5.9 H  


 


Chloride   


 


Carbon Dioxide  11 L   14 L


 


BUN  73 H   71 H


 


Creatinine  6.1 H   6.0 H


 


Glucose  336 H   225 H


 


POC Glucose   306 H 


 


Calcium  7.0 L   6.8 L


 


Total Creatine Kinase   


 


Troponin T   


 


Total Protein   


 


Albumin   


 


LDL Cholesterol Direct   


 


HDL Cholesterol   


 


TSH   


 


Urine Creatinine   














  08/19/18 08/19/18 08/19/18





  04:00 04:30 04:30


 


WBC    19.3 H


 


RBC    3.05 L


 


Hgb    8.8 L


 


Hct    27.1 L


 


RDW    16.6 H


 


Lymph % (Auto)    7.9 L


 


Mono #    1.2 H


 


Seg Neutrophils %    85.2 H


 


Seg Neuts % (Manual)   


 


Lymphocytes % (Manual)   


 


Seg Neutrophils #    16.4 H


 


Seg Neutrophils # Man   


 


Lymphocytes # (Manual)   


 


Monocytes # (Manual)   


 


POC ABG pH  7.274 L  


 


POC ABG pCO2  33.6 L  


 


POC ABG pO2  129 H  


 


Sodium   


 


Potassium   


 


Chloride   


 


Carbon Dioxide   15 L 


 


BUN   71 H 


 


Creatinine   5.8 H 


 


Glucose   135 H 


 


POC Glucose   


 


Calcium   6.6 L 


 


Total Creatine Kinase   


 


Troponin T   


 


Total Protein   


 


Albumin   


 


LDL Cholesterol Direct   


 


HDL Cholesterol   


 


TSH   


 


Urine Creatinine   














  08/19/18 08/19/18 08/19/18





  07:52 16:31 21:51


 


WBC   


 


RBC   


 


Hgb   


 


Hct   


 


RDW   


 


Lymph % (Auto)   


 


Mono #   


 


Seg Neutrophils %   


 


Seg Neuts % (Manual)   


 


Lymphocytes % (Manual)   


 


Seg Neutrophils #   


 


Seg Neutrophils # Man   


 


Lymphocytes # (Manual)   


 


Monocytes # (Manual)   


 


POC ABG pH   


 


POC ABG pCO2   


 


POC ABG pO2   


 


Sodium   


 


Potassium   


 


Chloride   


 


Carbon Dioxide   


 


BUN   


 


Creatinine   


 


Glucose   


 


POC Glucose  191 H  239 H  241 H


 


Calcium   


 


Total Creatine Kinase   


 


Troponin T   


 


Total Protein   


 


Albumin   


 


LDL Cholesterol Direct   


 


HDL Cholesterol   


 


TSH   


 


Urine Creatinine   














  08/20/18 08/20/18





  05:27 06:55


 


WBC  


 


RBC  


 


Hgb  


 


Hct  


 


RDW  


 


Lymph % (Auto)  


 


Mono #  


 


Seg Neutrophils %  


 


Seg Neuts % (Manual)  


 


Lymphocytes % (Manual)  


 


Seg Neutrophils #  


 


Seg Neutrophils # Man  


 


Lymphocytes # (Manual)  


 


Monocytes # (Manual)  


 


POC ABG pH  7.303 L 


 


POC ABG pCO2  30.3 L 


 


POC ABG pO2  72 L 


 


Sodium  


 


Potassium  


 


Chloride   112.0 H


 


Carbon Dioxide   14 L


 


BUN   72 H


 


Creatinine   5.6 H


 


Glucose   230 H


 


POC Glucose  


 


Calcium   6.1 L


 


Total Creatine Kinase  


 


Troponin T  


 


Total Protein  


 


Albumin  


 


LDL Cholesterol Direct  


 


HDL Cholesterol  


 


TSH  


 


Urine Creatinine

## 2018-08-20 NOTE — PROGRESS NOTE
Assessment and Plan





/PEA Cardiac arrest s/p CPR


Continue current management, cardiology following





/Hyperkalemia; Likely from CKD


s/p calcium gluconate, insulin and IV D50


Nephrology following, resolved





/Acute hypoxic respiratory failure; intubated on ventilatory support


Nebulizers, wean as tolerated and extubate, pulmonary critical consulted 


Pt on CPAP today





/History of COPD/?interstitial lung disease; scheduled nebs 





/Hypotension/septic shock; 


IV fluids, placed on Levophed per protocol, titrate blood pressures , systolic 

more than 100


intermittently requiring Levophed, wean off as tolerated





/Sepsis ; likely from colitis vs PNA


Continue empiric antibiotics, negative blood urine cultures, cont IV fluids and 

supportive care


Sputun cx positive for candida, cont on diflucan





/DM type 2, cont on insulin, accue check q6h





/Failure to thrive in an adult; 


Presented with Poor oral intake, no appetite, 


Started on feeding per TF protocol 





/Severe malnutrition: Nutrition supplements, nutrition consult ,supportive care





/Acute kidney injury; secondary to ATN


Worsening renal function, IV fluids, bicarbonate, nephrology  following





/History of coronary artery disease status post CABG; recent negative stress 

test


Continue current cardiac medications, cardiology following





/Atrial fibrillation; continue beta blockers,


placed on no anticoagulation as her episode following admission lasted less 

than 12 hours 


now having paroxysmal Atrial Fib, will reconsult cardiology for AC 

recommendation





/Hypothyroidism; continue Synthroid, thyroid panel reviewed





/Abdominal pain with Colitis ;


CT abdomen; thickening of the sigmoid colon, GI advised C. difficile studies - 

was not sent as symptom resolved


on Flagyl, possible colonoscopy in the future as needed


stop isolation





/DVT prophylaxis; SCDs, Lovenox renal dose





Patient is critically ill with poor prognosis


Patient's condition, recent events, poor prognosis discussed in detail with 

 and her daughter, They requested DO NOT RESUSCITATE, and to continue 

full treatment.





Patient is DNR status





Critical care time 35 minutes








The high probability of a clinically significant, sudden or life threatening 

deterioration of the [multiple] system(s) required my full and direct attention

, intervention and personal management. The aggregate critical care time was [35

] minutes. This time is in addition to time spent performing reported 

procedures but includes the following: 





[x] Data Review and interpretation 





[x] Patient assessment and monitoring of vital signs 





[x] Documentation 





[x] Medication orders and management








Brief history: 


89-year-old very frail and malnourished and cachectic female patient with 

history of coronary artery disease status post CABG, peripheral vascular 

disease chronic renal insufficiency chronic back pain, was admitted through 

emergency room with altered level of consciousness, poor oral intake and 

failure to thrive, acute on chronic kidney disease, and sepsis by protocol, 

atrial fib with RVR. Had cardiac arrest on 08/18/18 morning status post CPR and 

intubation. Patient had severe bradycardia and noted PEA on monitor, CODE BLUE 

was called, intubated and CPR was done per ACLS protocol by ER physician Dr. Casper,Transferred to ICU. Her k level was also noted 6.2.











 Hospitalist Physical 





General appearance: Present:  cachectic, disheveled, intubated





- EENT


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





- Neck


Neck: Present: supple, other (teaching in place)





- Respiratory


Respiratory effort: labored


Respiratory: bilateral: diminished, rhonchi, negative: rales, wheezing





- Cardiovascular


Rhythm: regular


Heart Sounds: Present: S1 & S2





- Extremities


Extremities: no ischemia


Extremity abnormal: edema (trace edema)





- Abdominal


General gastrointestinal: soft, non-tender, non-distended, normal bowel sounds





- Integumentary


Integumentary: Present: clear, warm





- Psychiatric


Psychiatric: other (intubated unresponsive)





- Neurologic


Neurologic: other (intubated Unresponsive)








Subjective


Date of service: 08/20/18


Principal diagnosis: abd pain, sigmoid colon thickening


Interval history: 





Patient is orally intubated on ventilatory support


Critically ill-looking, pale, cachectic and malnourished


Vital signs reviewed, discussed with family and RN at bedside


back on levophed today, diarrhea resolved











Objective





- Constitutional


Vitals: 


 Vital Signs - 12hr











  08/20/18 08/20/18 08/20/18





  01:00 02:00 03:00


 


Temperature   


 


Pulse Rate 95 H 94 H 87


 


Pulse Rate [   





Apical]   


 


Pulse Rate [   





Bilateral   





Throughout]   


 


Respiratory 16 17 18





Rate   


 


Respiratory   





Rate [Bilateral   





Throughout]   


 


Blood Pressure 115/52 113/51 112/52


 


O2 Sat by Pulse 96 94 97





Oximetry   














  08/20/18 08/20/18 08/20/18





  03:16 04:00 05:00


 


Temperature 97.8 F  


 


Pulse Rate  87 93 H


 


Pulse Rate [  90 





Apical]   


 


Pulse Rate [   





Bilateral   





Throughout]   


 


Respiratory  17 13





Rate   


 


Respiratory   





Rate [Bilateral   





Throughout]   


 


Blood Pressure  107/48 104/50


 


O2 Sat by Pulse  90 90





Oximetry   














  08/20/18 08/20/18 08/20/18





  06:00 07:00 08:00


 


Temperature   99.0 F


 


Pulse Rate 85 91 H 83


 


Pulse Rate [   





Apical]   


 


Pulse Rate [   





Bilateral   





Throughout]   


 


Respiratory 16 21 15





Rate   


 


Respiratory   





Rate [Bilateral   





Throughout]   


 


Blood Pressure 104/51 109/47 100/47


 


O2 Sat by Pulse 93 85 98





Oximetry   














  08/20/18 08/20/18 08/20/18





  09:00 09:15 09:20


 


Temperature   


 


Pulse Rate 92 H 93 H 


 


Pulse Rate [   





Apical]   


 


Pulse Rate [   95 H





Bilateral   





Throughout]   


 


Respiratory 14  





Rate   


 


Respiratory   16





Rate [Bilateral   





Throughout]   


 


Blood Pressure 103/46 103/46 


 


O2 Sat by Pulse 82 L 97 





Oximetry   














  08/20/18 08/20/18 08/20/18





  09:27 10:00 11:00


 


Temperature   


 


Pulse Rate  89 102 H


 


Pulse Rate [   





Apical]   


 


Pulse Rate [ 105 H  





Bilateral   





Throughout]   


 


Respiratory  14 14





Rate   


 


Respiratory 15  





Rate [Bilateral   





Throughout]   


 


Blood Pressure  108/45 108/44


 


O2 Sat by Pulse   97





Oximetry   














- Labs


CBC & Chem 7: 


 08/19/18 04:30





 08/20/18 06:55


Labs: 


 Abnormal lab results











  08/19/18 08/19/18 08/20/18 Range/Units





  16:31 21:51 05:27 


 


POC ABG pH    7.303 L  (7.35-7.45)  


 


POC ABG pCO2    30.3 L  (35-45)  


 


POC ABG pO2    72 L  ()  


 


Chloride     ()  mmol/L


 


Carbon Dioxide     (22-30)  mmol/L


 


BUN     (7-17)  mg/dL


 


Creatinine     (0.7-1.2)  mg/dL


 


Glucose     ()  mg/dL


 


POC Glucose  239 H  241 H   ()  


 


Calcium     (8.4-10.2)  mg/dL














  08/20/18 Range/Units





  06:55 


 


POC ABG pH   (7.35-7.45)  


 


POC ABG pCO2   (35-45)  


 


POC ABG pO2   ()  


 


Chloride  112.0 H  ()  mmol/L


 


Carbon Dioxide  14 L  (22-30)  mmol/L


 


BUN  72 H  (7-17)  mg/dL


 


Creatinine  5.6 H  (0.7-1.2)  mg/dL


 


Glucose  230 H  ()  mg/dL


 


POC Glucose   ()  


 


Calcium  6.1 L  (8.4-10.2)  mg/dL

## 2018-08-21 LAB
BAND NEUTROPHILS # (MANUAL): 4 K/MM3
BUN SERPL-MCNC: 79 MG/DL (ref 7–17)
BUN/CREAT SERPL: 14 %
CALCIUM SERPL-MCNC: 6.3 MG/DL (ref 8.4–10.2)
HCT VFR BLD CALC: 27.1 % (ref 30.3–42.9)
HCT VFR BLD CALC: 27.5 % (ref 30.3–42.9)
HEMOLYSIS INDEX: 18
HGB BLD-MCNC: 9.2 GM/DL (ref 10.1–14.3)
HGB BLD-MCNC: 9.4 GM/DL (ref 10.1–14.3)
MCH RBC QN AUTO: 29 PG (ref 28–32)
MCH RBC QN AUTO: 30 PG (ref 28–32)
MCHC RBC AUTO-ENTMCNC: 34 % (ref 30–34)
MCHC RBC AUTO-ENTMCNC: 35 % (ref 30–34)
MCV RBC AUTO: 86 FL (ref 79–97)
MCV RBC AUTO: 88 FL (ref 79–97)
MYELOCYTES # (MANUAL): 0 K/MM3
PLATELET # BLD: 170 K/MM3 (ref 140–440)
PLATELET # BLD: 189 K/MM3 (ref 140–440)
PROMYELOCYTES # (MANUAL): 0 K/MM3
RBC # BLD AUTO: 3.14 M/MM3 (ref 3.65–5.03)
RBC # BLD AUTO: 3.14 M/MM3 (ref 3.65–5.03)
TOTAL CELLS COUNTED BLD: 100

## 2018-08-21 RX ADMIN — FAMOTIDINE SCH MG: 20 TABLET ORAL at 10:28

## 2018-08-21 RX ADMIN — Medication SCH EACH: at 23:27

## 2018-08-21 RX ADMIN — LEVOTHYROXINE SODIUM SCH MCG: 0.05 TABLET ORAL at 05:32

## 2018-08-21 RX ADMIN — EZETIMIBE SCH MG: 10 TABLET ORAL at 10:49

## 2018-08-21 RX ADMIN — ASPIRIN SCH MG: 81 TABLET, CHEWABLE ORAL at 22:43

## 2018-08-21 RX ADMIN — MEMANTINE SCH MG: 5 TABLET ORAL at 10:28

## 2018-08-21 RX ADMIN — NOREPINEPHRINE BITARTRATE SCH MLS/HR: 16 INJECTION, SOLUTION INTRAVENOUS at 23:30

## 2018-08-21 RX ADMIN — LORATADINE SCH MG: 10 TABLET ORAL at 10:28

## 2018-08-21 RX ADMIN — METRONIDAZOLE SCH MLS/HR: 5 INJECTION, SOLUTION INTRAVENOUS at 05:32

## 2018-08-21 RX ADMIN — MULTIVITAMIN TABLET SCH EACH: TABLET at 10:28

## 2018-08-21 RX ADMIN — METRONIDAZOLE SCH MLS/HR: 5 INJECTION, SOLUTION INTRAVENOUS at 14:19

## 2018-08-21 RX ADMIN — IPRATROPIUM BROMIDE AND ALBUTEROL SULFATE SCH AMPUL: .5; 3 SOLUTION RESPIRATORY (INHALATION) at 19:40

## 2018-08-21 RX ADMIN — Medication SCH ML: at 22:44

## 2018-08-21 RX ADMIN — IPRATROPIUM BROMIDE AND ALBUTEROL SULFATE SCH: .5; 3 SOLUTION RESPIRATORY (INHALATION) at 23:05

## 2018-08-21 RX ADMIN — ENOXAPARIN SODIUM SCH MG: 100 INJECTION SUBCUTANEOUS at 22:43

## 2018-08-21 RX ADMIN — IPRATROPIUM BROMIDE AND ALBUTEROL SULFATE SCH AMPUL: .5; 3 SOLUTION RESPIRATORY (INHALATION) at 10:20

## 2018-08-21 RX ADMIN — SODIUM CHLORIDE SCH MLS/HR: 0.45 INJECTION, SOLUTION INTRAVENOUS at 12:24

## 2018-08-21 RX ADMIN — Medication SCH EACH: at 10:28

## 2018-08-21 RX ADMIN — MEMANTINE SCH MG: 5 TABLET ORAL at 22:43

## 2018-08-21 RX ADMIN — METRONIDAZOLE SCH MLS/HR: 5 INJECTION, SOLUTION INTRAVENOUS at 22:43

## 2018-08-21 RX ADMIN — Medication SCH ML: at 10:33

## 2018-08-21 NOTE — PROGRESS NOTE
Assessment and Plan





1. Acute kidney injury:


FELICIA superimposed on CKD in the setting of volume depletion, hypotension and 

Cardiac arrest.


Creatinine leveled off.


Renal prognosis is guarded.


No dialysis per family.


Recommend IV fluids.





2. Electrolytes:


Hyperkalemia, improved.


Anion gap metabolic acidosis, was on IV Sodium bicarbonate drip.





3. S/p PEA.





4. Hypotension:


On Levophed.





5. Respiratory failure:


On vent.





6. Sepsis.





7. A.fib.





8. Anemia.





Subjective


Date of service: 08/21/18


Principal diagnosis: abd pain, sigmoid colon thickening


Interval history: 


Patient was seen and examined at the bedside.





Objective





- Vital Signs


Vital signs: 


 Vital Signs - 12hr











  08/20/18 08/20/18 08/20/18





  20:00 20:30 21:00


 


Temperature  98.6 F 


 


Pulse Rate 87  92 H


 


Respiratory 14  15





Rate   


 


Respiratory   





Rate [   





Generalized]   


 


Blood Pressure 119/53  128/56


 


O2 Sat by Pulse 97  95





Oximetry   














  08/20/18 08/20/18 08/20/18





  21:39 21:50 22:00


 


Temperature  98.6 F 


 


Pulse Rate 89 90 97 H


 


Respiratory 13 15 15





Rate   


 


Respiratory   





Rate [   





Generalized]   


 


Blood Pressure 156/51 131/56 131/58


 


O2 Sat by Pulse  95 95





Oximetry   














  08/20/18 08/21/18 08/21/18





  23:00 00:00 01:00


 


Temperature   


 


Pulse Rate 91 H 89 98 H


 


Respiratory 11 L 14 14





Rate   


 


Respiratory   





Rate [   





Generalized]   


 


Blood Pressure 116/52 134/61 138/57


 


O2 Sat by Pulse 96 96 97





Oximetry   














  08/21/18 08/21/18 08/21/18





  02:00 03:00 04:00


 


Temperature   97.8 F


 


Pulse Rate 97 H 99 H 89


 


Respiratory 13 13 





Rate   


 


Respiratory   





Rate [   





Generalized]   


 


Blood Pressure 119/53 113/44 


 


O2 Sat by Pulse 95  95





Oximetry   














  08/21/18 08/21/18 08/21/18





  04:23 04:31 05:00


 


Temperature   


 


Pulse Rate 87 90 94 H


 


Respiratory 13  12





Rate   


 


Respiratory   





Rate [   





Generalized]   


 


Blood Pressure  108/41 108/41


 


O2 Sat by Pulse 95 95 95





Oximetry   














  08/21/18 08/21/18 08/21/18





  05:41 06:00 07:00


 


Temperature   


 


Pulse Rate 94 H 106 H 93 H


 


Respiratory  14 14





Rate   


 


Respiratory 15  





Rate [   





Generalized]   


 


Blood Pressure  100/43 88/40


 


O2 Sat by Pulse  95 96





Oximetry   














- General Appearance


General appearance: well-developed, appears stated age, intubated, other (on 

vent, FiO2 30%)


EENT: ATNC, sclerotic cornea (left eye)


Neck: supple


Respiratory: Present: Clear to Ascultation


Cardiology: irregularly irregular, S1S2, no murmurs


Gastrointestinal: normoactive bowel sounds, no tenderness


Integumentary: no rash, warm and dry


Neurologic: other (alert, not following any command)


Musculoskeletal: other (no edema)





- Lab





 08/21/18 13:35





 08/21/18 04:01


 Most recent lab results











Calcium  6.3 mg/dL (8.4-10.2)  L  08/21/18  04:01    


 


Magnesium  2.20 mg/dL (1.7-2.3)   08/17/18  06:17    


 


Urine Creatinine  47.8 mg/dL (0.1-20.0)  H  08/16/18  11:45    


 


Urine Sodium  27 mmol/L  08/16/18  11:45

## 2018-08-21 NOTE — PROGRESS NOTE
Assessment and Plan








PEA/cardiac arrest


Acute respiratory failure 


hypoxemic encephalopathy.Improving


Atelectasis LLL/gastric distention. Resolved after Dubhoff


Leukocytosis.No fever











Recommendations





RSBI low , but tolerated PSV/CPAP yesterday w/o incident


Proceed with SBT on PSV/CPAP as tolerated


Wean remaining pressor gtt


Monitor x fever


Update CXR








 


 Critical care time was 31 minutes of face-to-face evaluation and coordination 

of care








Subjective


Date of service: 08/21/18


Principal diagnosis: abd pain, sigmoid colon thickening


Interval history: 





Some ETT discomfort





Objective


 Vital Signs - 12hr











  08/20/18 08/20/18 08/20/18





  21:00 21:39 21:50


 


Temperature   98.6 F


 


Pulse Rate 92 H 89 90


 


Respiratory 15 13 15





Rate   


 


Respiratory   





Rate [   





Generalized]   


 


Blood Pressure 128/56 156/51 131/56


 


O2 Sat by Pulse 95  95





Oximetry   














  08/20/18 08/20/18 08/21/18





  22:00 23:00 00:00


 


Temperature   


 


Pulse Rate 97 H 91 H 89


 


Respiratory 15 11 L 14





Rate   


 


Respiratory   





Rate [   





Generalized]   


 


Blood Pressure 131/58 116/52 134/61


 


O2 Sat by Pulse 95 96 96





Oximetry   














  08/21/18 08/21/18 08/21/18





  01:00 02:00 03:00


 


Temperature   


 


Pulse Rate 98 H 97 H 99 H


 


Respiratory 14 13 13





Rate   


 


Respiratory   





Rate [   





Generalized]   


 


Blood Pressure 138/57 119/53 113/44


 


O2 Sat by Pulse 97 95 





Oximetry   














  08/21/18 08/21/18 08/21/18





  04:00 04:23 04:31


 


Temperature 97.8 F  


 


Pulse Rate 89 87 90


 


Respiratory  13 





Rate   


 


Respiratory   





Rate [   





Generalized]   


 


Blood Pressure   108/41


 


O2 Sat by Pulse 95 95 95





Oximetry   














  08/21/18 08/21/18 08/21/18





  05:00 05:41 06:00


 


Temperature   


 


Pulse Rate 94 H 94 H 106 H


 


Respiratory 12  14





Rate   


 


Respiratory  15 





Rate [   





Generalized]   


 


Blood Pressure 108/41  100/43


 


O2 Sat by Pulse 95  95





Oximetry   














  08/21/18





  07:00


 


Temperature 


 


Pulse Rate 93 H


 


Respiratory 14





Rate 


 


Respiratory 





Rate [ 





Generalized] 


 


Blood Pressure 88/40


 


O2 Sat by Pulse 96





Oximetry 











Constitutional: no acute distress, asleep


Eyes: non-icteric


ENT: other (orally intubated and not on sedation)


Neck: supple


Ascultation: Left: rhonchi, Bilateral: clear


Percussion: Bilateral: not dull


Cardiovascular: irregular rhythm


Gastrointestinal: soft, non-tender, non-distended


Extremities: no cyanosis, no edema, pulses normal (diminished in lower extremity

), cool


Neurologic: pupils equal and round, CN II-XII normal, motor strength normal and

, other (appears confused, but follows commands)


CBC and BMP: 


 08/21/18 04:01





 08/21/18 04:01


ABG, PT/INR, D-dimer: 


ABG











POC ABG pH  7.320  (7.35-7.45)  L  08/21/18  05:47    


 


POC ABG pCO2  31.4  (35-45)  L  08/21/18  05:47    


 


POC ABG pO2  81  ()   08/21/18  05:47    


 


POC ABG HCO3  16.2   08/21/18  05:47    


 


POC ABG Total CO2  17   08/21/18  05:47    


 


POC ABG O2 Sat  95   08/21/18  05:47    








Abnormal lab findings: 


 Abnormal Labs











  08/15/18 08/15/18 08/15/18





  14:01 15:05 15:05


 


WBC   18.7 H 


 


RBC   3.52 L 


 


Hgb   


 


Hct   


 


RDW   15.6 H 


 


Lymph % (Auto)   


 


Mono #   


 


Seg Neutrophils %   


 


Seg Neuts % (Manual)   87.0 H 


 


Lymphocytes % (Manual)   9.0 L 


 


Seg Neutrophils #   


 


Seg Neutrophils # Man   16.3 H 


 


Lymphocytes # (Manual)   


 


Monocytes # (Manual)   


 


POC ABG pH   


 


POC ABG pCO2   


 


POC ABG pO2   


 


Sodium    128 L


 


Potassium   


 


Chloride    89.6 L


 


Carbon Dioxide    20 L


 


BUN    54 H


 


Creatinine    4.9 H


 


Glucose    121 H


 


POC Glucose  153 H  


 


Calcium   


 


Total Creatine Kinase    387 H


 


Troponin T    0.053 H


 


Total Protein   


 


Albumin    2.8 L


 


LDL Cholesterol Direct    27 L


 


HDL Cholesterol    32 L


 


TSH   


 


Urine Creatinine   














  08/15/18 08/15/18 08/16/18





  15:05 21:45 02:51


 


WBC   


 


RBC   


 


Hgb   


 


Hct   


 


RDW   


 


Lymph % (Auto)   


 


Mono #   


 


Seg Neutrophils %   


 


Seg Neuts % (Manual)   


 


Lymphocytes % (Manual)   


 


Seg Neutrophils #   


 


Seg Neutrophils # Man   


 


Lymphocytes # (Manual)   


 


Monocytes # (Manual)   


 


POC ABG pH   


 


POC ABG pCO2   


 


POC ABG pO2   


 


Sodium    129 L


 


Potassium   


 


Chloride    95.0 L


 


Carbon Dioxide    17 L


 


BUN    57 H


 


Creatinine    4.8 H


 


Glucose    131 H


 


POC Glucose   235 H 


 


Calcium    7.4 L


 


Total Creatine Kinase   


 


Troponin T   


 


Total Protein   


 


Albumin   


 


LDL Cholesterol Direct   


 


HDL Cholesterol   


 


TSH  7.530 H  


 


Urine Creatinine   














  08/16/18 08/16/18 08/16/18





  06:53 06:59 07:33


 


WBC   


 


RBC   


 


Hgb   


 


Hct   


 


RDW   


 


Lymph % (Auto)   


 


Mono #   


 


Seg Neutrophils %   


 


Seg Neuts % (Manual)   


 


Lymphocytes % (Manual)   


 


Seg Neutrophils #   


 


Seg Neutrophils # Man   


 


Lymphocytes # (Manual)   


 


Monocytes # (Manual)   


 


POC ABG pH   


 


POC ABG pCO2   


 


POC ABG pO2   


 


Sodium   


 


Potassium   


 


Chloride   


 


Carbon Dioxide   


 


BUN   


 


Creatinine   


 


Glucose   228 H 


 


POC Glucose  < 40 L   184 H


 


Calcium   


 


Total Creatine Kinase   


 


Troponin T   


 


Total Protein   


 


Albumin   


 


LDL Cholesterol Direct   


 


HDL Cholesterol   


 


TSH   


 


Urine Creatinine   














  08/16/18 08/16/18 08/16/18





  10:59 11:45 16:48


 


WBC   


 


RBC   


 


Hgb   


 


Hct   


 


RDW   


 


Lymph % (Auto)   


 


Mono #   


 


Seg Neutrophils %   


 


Seg Neuts % (Manual)   


 


Lymphocytes % (Manual)   


 


Seg Neutrophils #   


 


Seg Neutrophils # Man   


 


Lymphocytes # (Manual)   


 


Monocytes # (Manual)   


 


POC ABG pH   


 


POC ABG pCO2   


 


POC ABG pO2   


 


Sodium   


 


Potassium   


 


Chloride   


 


Carbon Dioxide   


 


BUN   


 


Creatinine   


 


Glucose   


 


POC Glucose  234 H   117 H


 


Calcium   


 


Total Creatine Kinase   


 


Troponin T   


 


Total Protein   


 


Albumin   


 


LDL Cholesterol Direct   


 


HDL Cholesterol   


 


TSH   


 


Urine Creatinine   47.8 H 














  08/17/18 08/17/18 08/17/18





  06:17 06:17 07:40


 


WBC  12.8 H  


 


RBC  3.01 L  


 


Hgb  8.8 L  


 


Hct  27.4 L  


 


RDW  16.4 H  


 


Lymph % (Auto)   


 


Mono #   


 


Seg Neutrophils %   


 


Seg Neuts % (Manual)  86.0 H  


 


Lymphocytes % (Manual)  7.0 L  


 


Seg Neutrophils #   


 


Seg Neutrophils # Man  11.0 H  


 


Lymphocytes # (Manual)  0.9 L  


 


Monocytes # (Manual)  0.9 H  


 


POC ABG pH   


 


POC ABG pCO2   


 


POC ABG pO2   


 


Sodium   132 L 


 


Potassium   


 


Chloride   94.7 L 


 


Carbon Dioxide   12 L 


 


BUN   63 H 


 


Creatinine   5.6 H 


 


Glucose   225 H 


 


POC Glucose    241 H


 


Calcium   7.1 L 


 


Total Creatine Kinase   519 H 


 


Troponin T   


 


Total Protein   4.8 L D 


 


Albumin   2.0 L 


 


LDL Cholesterol Direct   


 


HDL Cholesterol   


 


TSH   


 


Urine Creatinine   














  08/17/18 08/17/18 08/18/18





  12:12 17:15 05:21


 


WBC   


 


RBC   


 


Hgb   


 


Hct   


 


RDW   


 


Lymph % (Auto)   


 


Mono #   


 


Seg Neutrophils %   


 


Seg Neuts % (Manual)   


 


Lymphocytes % (Manual)   


 


Seg Neutrophils #   


 


Seg Neutrophils # Man   


 


Lymphocytes # (Manual)   


 


Monocytes # (Manual)   


 


POC ABG pH   


 


POC ABG pCO2   


 


POC ABG pO2   


 


Sodium   


 


Potassium   


 


Chloride   


 


Carbon Dioxide   


 


BUN   


 


Creatinine   


 


Glucose   


 


POC Glucose  237 H  165 H  318 H


 


Calcium   


 


Total Creatine Kinase   


 


Troponin T   


 


Total Protein   


 


Albumin   


 


LDL Cholesterol Direct   


 


HDL Cholesterol   


 


TSH   


 


Urine Creatinine   














  08/18/18 08/18/18 08/18/18





  06:28 07:17 08:34


 


WBC   


 


RBC   


 


Hgb   


 


Hct   


 


RDW   


 


Lymph % (Auto)   


 


Mono #   


 


Seg Neutrophils %   


 


Seg Neuts % (Manual)   


 


Lymphocytes % (Manual)   


 


Seg Neutrophils #   


 


Seg Neutrophils # Man   


 


Lymphocytes # (Manual)   


 


Monocytes # (Manual)   


 


POC ABG pH    7.037 L


 


POC ABG pCO2    31.9 L


 


POC ABG pO2    303 H


 


Sodium  136 L  


 


Potassium  6.2 H* D  


 


Chloride   


 


Carbon Dioxide  7 L*  


 


BUN  72 H  


 


Creatinine  6.3 H  


 


Glucose  253 H  


 


POC Glucose   292 H 


 


Calcium  7.1 L  


 


Total Creatine Kinase   


 


Troponin T   


 


Total Protein   


 


Albumin   


 


LDL Cholesterol Direct   


 


HDL Cholesterol   


 


TSH   


 


Urine Creatinine   














  08/18/18 08/18/18 08/18/18





  09:37 11:57 12:33


 


WBC   


 


RBC   


 


Hgb   


 


Hct   


 


RDW   


 


Lymph % (Auto)   


 


Mono #   


 


Seg Neutrophils %   


 


Seg Neuts % (Manual)   


 


Lymphocytes % (Manual)   


 


Seg Neutrophils #   


 


Seg Neutrophils # Man   


 


Lymphocytes # (Manual)   


 


Monocytes # (Manual)   


 


POC ABG pH   


 


POC ABG pCO2   


 


POC ABG pO2   


 


Sodium   


 


Potassium   


 


Chloride   


 


Carbon Dioxide   


 


BUN   


 


Creatinine   


 


Glucose   


 


POC Glucose  414 H  379 H 


 


Calcium   


 


Total Creatine Kinase   


 


Troponin T    0.206 H* D


 


Total Protein   


 


Albumin   


 


LDL Cholesterol Direct   


 


HDL Cholesterol   


 


TSH   


 


Urine Creatinine   














  08/18/18 08/18/18 08/18/18





  12:33 14:23 17:34


 


WBC   


 


RBC   


 


Hgb   


 


Hct   


 


RDW   


 


Lymph % (Auto)   


 


Mono #   


 


Seg Neutrophils %   


 


Seg Neuts % (Manual)   


 


Lymphocytes % (Manual)   


 


Seg Neutrophils #   


 


Seg Neutrophils # Man   


 


Lymphocytes # (Manual)   


 


Monocytes # (Manual)   


 


POC ABG pH   


 


POC ABG pCO2   


 


POC ABG pO2   


 


Sodium  132 L   134 L


 


Potassium  5.9 H  


 


Chloride   


 


Carbon Dioxide  11 L   14 L


 


BUN  73 H   71 H


 


Creatinine  6.1 H   6.0 H


 


Glucose  336 H   225 H


 


POC Glucose   306 H 


 


Calcium  7.0 L   6.8 L


 


Total Creatine Kinase   


 


Troponin T   


 


Total Protein   


 


Albumin   


 


LDL Cholesterol Direct   


 


HDL Cholesterol   


 


TSH   


 


Urine Creatinine   














  08/19/18 08/19/18 08/19/18





  04:00 04:30 04:30


 


WBC    19.3 H


 


RBC    3.05 L


 


Hgb    8.8 L


 


Hct    27.1 L


 


RDW    16.6 H


 


Lymph % (Auto)    7.9 L


 


Mono #    1.2 H


 


Seg Neutrophils %    85.2 H


 


Seg Neuts % (Manual)   


 


Lymphocytes % (Manual)   


 


Seg Neutrophils #    16.4 H


 


Seg Neutrophils # Man   


 


Lymphocytes # (Manual)   


 


Monocytes # (Manual)   


 


POC ABG pH  7.274 L  


 


POC ABG pCO2  33.6 L  


 


POC ABG pO2  129 H  


 


Sodium   


 


Potassium   


 


Chloride   


 


Carbon Dioxide   15 L 


 


BUN   71 H 


 


Creatinine   5.8 H 


 


Glucose   135 H 


 


POC Glucose   


 


Calcium   6.6 L 


 


Total Creatine Kinase   


 


Troponin T   


 


Total Protein   


 


Albumin   


 


LDL Cholesterol Direct   


 


HDL Cholesterol   


 


TSH   


 


Urine Creatinine   














  08/19/18 08/19/18 08/19/18





  07:52 16:31 21:51


 


WBC   


 


RBC   


 


Hgb   


 


Hct   


 


RDW   


 


Lymph % (Auto)   


 


Mono #   


 


Seg Neutrophils %   


 


Seg Neuts % (Manual)   


 


Lymphocytes % (Manual)   


 


Seg Neutrophils #   


 


Seg Neutrophils # Man   


 


Lymphocytes # (Manual)   


 


Monocytes # (Manual)   


 


POC ABG pH   


 


POC ABG pCO2   


 


POC ABG pO2   


 


Sodium   


 


Potassium   


 


Chloride   


 


Carbon Dioxide   


 


BUN   


 


Creatinine   


 


Glucose   


 


POC Glucose  191 H  239 H  241 H


 


Calcium   


 


Total Creatine Kinase   


 


Troponin T   


 


Total Protein   


 


Albumin   


 


LDL Cholesterol Direct   


 


HDL Cholesterol   


 


TSH   


 


Urine Creatinine   














  08/20/18 08/20/18 08/20/18





  05:27 06:55 12:25


 


WBC   


 


RBC   


 


Hgb   


 


Hct   


 


RDW   


 


Lymph % (Auto)   


 


Mono #   


 


Seg Neutrophils %   


 


Seg Neuts % (Manual)   


 


Lymphocytes % (Manual)   


 


Seg Neutrophils #   


 


Seg Neutrophils # Man   


 


Lymphocytes # (Manual)   


 


Monocytes # (Manual)   


 


POC ABG pH  7.303 L  


 


POC ABG pCO2  30.3 L  


 


POC ABG pO2  72 L  


 


Sodium   


 


Potassium   


 


Chloride   112.0 H 


 


Carbon Dioxide   14 L 


 


BUN   72 H 


 


Creatinine   5.6 H 


 


Glucose   230 H 


 


POC Glucose    279 H


 


Calcium   6.1 L 


 


Total Creatine Kinase   


 


Troponin T   


 


Total Protein   


 


Albumin   


 


LDL Cholesterol Direct   


 


HDL Cholesterol   


 


TSH   


 


Urine Creatinine   














  08/21/18 08/21/18 08/21/18





  04:01 04:01 05:47


 


WBC   19.9 H 


 


RBC   3.14 L 


 


Hgb   9.2 L 


 


Hct   27.5 L 


 


RDW   17.0 H 


 


Lymph % (Auto)   


 


Mono #   


 


Seg Neutrophils %   


 


Seg Neuts % (Manual)   73.0 H 


 


Lymphocytes % (Manual)   5.0 L 


 


Seg Neutrophils #   


 


Seg Neutrophils # Man   14.5 H 


 


Lymphocytes # (Manual)   1.0 L 


 


Monocytes # (Manual)   


 


POC ABG pH    7.320 L


 


POC ABG pCO2    31.4 L


 


POC ABG pO2   


 


Sodium  146 H  


 


Potassium   


 


Chloride  111.2 H  


 


Carbon Dioxide  16 L  


 


BUN  79 H  


 


Creatinine  5.7 H  


 


Glucose  253 H  


 


POC Glucose   


 


Calcium  6.3 L  


 


Total Creatine Kinase   


 


Troponin T   


 


Total Protein   


 


Albumin   


 


LDL Cholesterol Direct   


 


HDL Cholesterol   


 


TSH   


 


Urine Creatinine

## 2018-08-21 NOTE — XRAY REPORT
AP CHEST:



HISTORY: Left lower lobe atelectasis, persistent high white blood cell 

count



Compared to 8/18/18. A small left pleural effusion has developed. There 

is ill-defined opacity in the left lower lobe which is most consistent 

with segmental atelectasis although I cannot exclude a left lower lobe 

infiltrate. Trace right pleural effusion is unchanged. The lungs are 

clear otherwise. Heart and mediastinal structures are within normal 

limits. Previous CABG changes are suspected. An endotracheal tube and 

Dobbhoff tube remain in position.



IMPRESSION:

Small left pleural effusion and subtle left lower lobe opacity as 

described which appears to be new.

Trace right pleural effusion, unchanged.

## 2018-08-21 NOTE — PROGRESS NOTE
Assessment and Plan





/PEA Cardiac arrest s/p CPR


Continue current management, cardiology following





/Hyperkalemia; Likely from CKD


s/p calcium gluconate, insulin and IV D50


Nephrology following, resolved





/Acute hypoxic respiratory failure; intubated on ventilatory support


Nebulizers, wean as tolerated and extubate, pulmonary critical consulted 


Pt on CPAP today





/History of COPD/?interstitial lung disease; scheduled nebs 





/Hypotension/septic shock; 


IV fluids, placed on Levophed per protocol, titrate blood pressures , systolic 

more than 100


intermittently requiring Levophed, wean off as tolerated





/Sepsis ; likely from colitis vs PNA


Continue empiric antibiotics, negative blood urine cultures, cont IV fluids and 

supportive care


Sputun cx positive for candida, cont on diflucan





/DM type 2, cont on insulin, accue check q6h





/Failure to thrive in an adult; 


Presented with Poor oral intake, no appetite, 


Started on feeding per TF protocol 





/Severe malnutrition: Nutrition supplements, nutrition consult ,supportive care





/Acute kidney injury; secondary to ATN


Worsening renal function, IV fluids, bicarbonate, nephrology  following





/History of coronary artery disease status post CABG; recent negative stress 

test


Continue current cardiac medications, cardiology following





/Atrial fibrillation; continue beta blockers,


placed on no anticoagulation as her episode following admission lasted less 

than 12 hours 


now having paroxysmal Atrial Fib, will reconsult cardiology for AC 

recommendation





/Hypothyroidism; continue Synthroid, thyroid panel reviewed





/Abdominal pain with Colitis ;


CT abdomen; thickening of the sigmoid colon, GI advised C. difficile studies - 

was not sent as symptom resolved


on Flagyl, possible colonoscopy in the future as needed


stop isolation





/DVT prophylaxis; SCDs, Lovenox renal dose





Patient is critically ill with poor prognosis


Patient's condition, recent events, poor prognosis discussed in detail with 

 and her daughter, They requested DO NOT RESUSCITATE, and to continue 

full treatment.





Patient is DNR status





Critical care time 35 minutes








The high probability of a clinically significant, sudden or life threatening 

deterioration of the [multiple] system(s) required my full and direct attention

, intervention and personal management. The aggregate critical care time was [35

] minutes. This time is in addition to time spent performing reported 

procedures but includes the following: 





[x] Data Review and interpretation 





[x] Patient assessment and monitoring of vital signs 





[x] Documentation 





[x] Medication orders and management








Brief history: 


89-year-old very frail and malnourished and cachectic female patient with 

history of coronary artery disease status post CABG, peripheral vascular 

disease chronic renal insufficiency chronic back pain, was admitted through 

emergency room with altered level of consciousness, poor oral intake and 

failure to thrive, acute on chronic kidney disease, and sepsis by protocol, 

atrial fib with RVR. Had cardiac arrest on 08/18/18 morning status post CPR and 

intubation. Patient had severe bradycardia and noted PEA on monitor, CODE BLUE 

was called, intubated and CPR was done per ACLS protocol by ER physician Dr. Casper,Transferred to ICU. Her k level was also noted 6.2.











 Hospitalist Physical 





General appearance: Present:  cachectic, disheveled, intubated





- EENT


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





- Neck


Neck: Present: supple, other (teaching in place)





- Respiratory


Respiratory effort: labored


Respiratory: bilateral: diminished, rhonchi, negative: rales, wheezing





- Cardiovascular


Rhythm: regular


Heart Sounds: Present: S1 & S2





- Extremities


Extremities: no ischemia


Extremity abnormal: edema (3+ edema)





- Abdominal


General gastrointestinal: soft, non-tender, non-distended, normal bowel sounds





- Integumentary


Integumentary: Present: clear, warm





- Psychiatric


Psychiatric: other (intubated unresponsive)





- Neurologic


Neurologic: other (intubated Unresponsive)








Subjective


Date of service: 08/21/18


Principal diagnosis: abd pain, sigmoid colon thickening


Interval history: 





Patient is orally intubated on ventilatory support


Critically ill-looking, pale, cachectic and malnourished


Vital signs reviewed, discussed with family and RN at bedside


on levophed, diarrhea resolved











Objective





- Constitutional


Vitals: 


 Vital Signs - 12hr











  08/21/18 08/21/18 08/21/18





  00:00 01:00 02:00


 


Temperature   


 


Pulse Rate 89 98 H 97 H


 


Respiratory 14 14 13





Rate   


 


Respiratory   





Rate [   





Generalized]   


 


Blood Pressure 134/61 138/57 119/53


 


O2 Sat by Pulse 96 97 95





Oximetry   














  08/21/18 08/21/18 08/21/18





  03:00 04:00 04:23


 


Temperature  97.8 F 


 


Pulse Rate 99 H 89 87


 


Respiratory 13  13





Rate   


 


Respiratory   





Rate [   





Generalized]   


 


Blood Pressure 113/44  


 


O2 Sat by Pulse  95 95





Oximetry   














  08/21/18 08/21/18 08/21/18





  04:31 05:00 05:41


 


Temperature   


 


Pulse Rate 90 94 H 94 H


 


Respiratory  12 





Rate   


 


Respiratory   15





Rate [   





Generalized]   


 


Blood Pressure 108/41 108/41 


 


O2 Sat by Pulse 95 95 





Oximetry   














  08/21/18 08/21/18 08/21/18





  06:00 07:00 08:00


 


Temperature   


 


Pulse Rate 106 H 93 H 101 H


 


Respiratory 14 14 16





Rate   


 


Respiratory   





Rate [   





Generalized]   


 


Blood Pressure 100/43 88/40 138/63


 


O2 Sat by Pulse 95 96 96





Oximetry   














  08/21/18 08/21/18





  09:00 10:00


 


Temperature 98.5 F 


 


Pulse Rate 97 H 100 H


 


Respiratory 14 16





Rate  


 


Respiratory  





Rate [  





Generalized]  


 


Blood Pressure 122/54 123/59


 


O2 Sat by Pulse 96 95





Oximetry  














- Labs


CBC & Chem 7: 


 08/21/18 13:35





 08/22/18 04:39


Labs: 


 Abnormal lab results











  08/20/18 08/21/18 08/21/18 Range/Units





  12:25 04:01 04:01 


 


WBC    19.9 H  (4.5-11.0)  K/mm3


 


RBC    3.14 L  (3.65-5.03)  M/mm3


 


Hgb    9.2 L  (10.1-14.3)  gm/dl


 


Hct    27.5 L  (30.3-42.9)  %


 


RDW    17.0 H  (13.2-15.2)  %


 


Seg Neuts % (Manual)    73.0 H  (40.0-70.0)  %


 


Lymphocytes % (Manual)    5.0 L  (13.4-35.0)  %


 


Seg Neutrophils # Man    14.5 H  (1.8-7.7)  K/mm3


 


Lymphocytes # (Manual)    1.0 L  (1.2-5.4)  K/mm3


 


POC ABG pH     (7.35-7.45)  


 


POC ABG pCO2     (35-45)  


 


Sodium   146 H   (137-145)  mmol/L


 


Chloride   111.2 H   ()  mmol/L


 


Carbon Dioxide   16 L   (22-30)  mmol/L


 


BUN   79 H   (7-17)  mg/dL


 


Creatinine   5.7 H   (0.7-1.2)  mg/dL


 


Glucose   253 H   ()  mg/dL


 


POC Glucose  279 H    ()  


 


Calcium   6.3 L   (8.4-10.2)  mg/dL














  08/21/18 Range/Units





  05:47 


 


WBC   (4.5-11.0)  K/mm3


 


RBC   (3.65-5.03)  M/mm3


 


Hgb   (10.1-14.3)  gm/dl


 


Hct   (30.3-42.9)  %


 


RDW   (13.2-15.2)  %


 


Seg Neuts % (Manual)   (40.0-70.0)  %


 


Lymphocytes % (Manual)   (13.4-35.0)  %


 


Seg Neutrophils # Man   (1.8-7.7)  K/mm3


 


Lymphocytes # (Manual)   (1.2-5.4)  K/mm3


 


POC ABG pH  7.320 L  (7.35-7.45)  


 


POC ABG pCO2  31.4 L  (35-45)  


 


Sodium   (137-145)  mmol/L


 


Chloride   ()  mmol/L


 


Carbon Dioxide   (22-30)  mmol/L


 


BUN   (7-17)  mg/dL


 


Creatinine   (0.7-1.2)  mg/dL


 


Glucose   ()  mg/dL


 


POC Glucose   ()  


 


Calcium   (8.4-10.2)  mg/dL

## 2018-08-22 LAB
ANISOCYTOSIS BLD QL SMEAR: (no result)
BAND NEUTROPHILS # (MANUAL): 0.8 K/MM3
BUN SERPL-MCNC: 87 MG/DL (ref 7–17)
BUN/CREAT SERPL: 15 %
BURR CELLS/RBC NFR CSF MANUAL: (no result) %
CALCIUM SERPL-MCNC: 6.4 MG/DL (ref 8.4–10.2)
HCT VFR BLD CALC: 26 % (ref 30.3–42.9)
HEMOLYSIS INDEX: 2
HGB BLD-MCNC: 8.5 GM/DL (ref 10.1–14.3)
MCH RBC QN AUTO: 29 PG (ref 28–32)
MCHC RBC AUTO-ENTMCNC: 33 % (ref 30–34)
MCV RBC AUTO: 88 FL (ref 79–97)
MYELOCYTES # (MANUAL): 0 K/MM3
OVALOCYTES BLD QL SMEAR: (no result)
PLATELET # BLD: 164 K/MM3 (ref 140–440)
PROMYELOCYTES # (MANUAL): 0 K/MM3
RBC # BLD AUTO: 2.96 M/MM3 (ref 3.65–5.03)
TOTAL CELLS COUNTED BLD: 200

## 2018-08-22 RX ADMIN — METRONIDAZOLE SCH MLS/HR: 5 INJECTION, SOLUTION INTRAVENOUS at 15:00

## 2018-08-22 RX ADMIN — MEMANTINE SCH MG: 5 TABLET ORAL at 09:55

## 2018-08-22 RX ADMIN — IPRATROPIUM BROMIDE AND ALBUTEROL SULFATE SCH AMPUL: .5; 3 SOLUTION RESPIRATORY (INHALATION) at 07:15

## 2018-08-22 RX ADMIN — ASPIRIN SCH MG: 81 TABLET, CHEWABLE ORAL at 21:35

## 2018-08-22 RX ADMIN — FAMOTIDINE SCH MG: 20 TABLET ORAL at 09:55

## 2018-08-22 RX ADMIN — METRONIDAZOLE SCH MLS/HR: 5 INJECTION, SOLUTION INTRAVENOUS at 06:30

## 2018-08-22 RX ADMIN — Medication SCH EACH: at 09:54

## 2018-08-22 RX ADMIN — NOREPINEPHRINE BITARTRATE SCH MLS/HR: 16 INJECTION, SOLUTION INTRAVENOUS at 22:29

## 2018-08-22 RX ADMIN — SODIUM CHLORIDE SCH MLS/HR: 0.45 INJECTION, SOLUTION INTRAVENOUS at 07:39

## 2018-08-22 RX ADMIN — ENOXAPARIN SODIUM SCH MG: 100 INJECTION SUBCUTANEOUS at 21:34

## 2018-08-22 RX ADMIN — Medication SCH EACH: at 21:34

## 2018-08-22 RX ADMIN — IPRATROPIUM BROMIDE AND ALBUTEROL SULFATE SCH AMPUL: .5; 3 SOLUTION RESPIRATORY (INHALATION) at 13:45

## 2018-08-22 RX ADMIN — NOREPINEPHRINE BITARTRATE SCH MLS/HR: 16 INJECTION, SOLUTION INTRAVENOUS at 17:56

## 2018-08-22 RX ADMIN — Medication SCH ML: at 21:30

## 2018-08-22 RX ADMIN — MEMANTINE SCH MG: 5 TABLET ORAL at 21:35

## 2018-08-22 RX ADMIN — LORATADINE SCH MG: 10 TABLET ORAL at 09:55

## 2018-08-22 RX ADMIN — METRONIDAZOLE SCH MLS/HR: 5 INJECTION, SOLUTION INTRAVENOUS at 21:35

## 2018-08-22 RX ADMIN — TRAMADOL HYDROCHLORIDE PRN MG: 50 TABLET, COATED ORAL at 21:33

## 2018-08-22 RX ADMIN — MULTIVITAMIN TABLET SCH EACH: TABLET at 09:55

## 2018-08-22 RX ADMIN — Medication SCH ML: at 09:57

## 2018-08-22 RX ADMIN — IPRATROPIUM BROMIDE AND ALBUTEROL SULFATE SCH AMPUL: .5; 3 SOLUTION RESPIRATORY (INHALATION) at 19:37

## 2018-08-22 RX ADMIN — LEVOTHYROXINE SODIUM SCH MCG: 0.05 TABLET ORAL at 06:30

## 2018-08-22 RX ADMIN — EZETIMIBE SCH MG: 10 TABLET ORAL at 09:55

## 2018-08-22 NOTE — PROGRESS NOTE
Assessment and Plan








PEA/cardiac arrest


Acute respiratory failure 


Hypoxemic encephalopathy.Improving


Atelectasis LLL infiltrate/new w effusion. Active pneumonia?


Metabolic AG acidosis


Gastric distention. Resolved after [place]


Leukocytosis. Persistent


Uncontrolled DM. Sepsis?


FELICIA. Patient family refused HD per notes,worsening











Recommendations








Proceed with SBT on PSV/CPAP as tolerated,but further progress may be limited 

by acidosis at this point


Add Zosyn or Cefepime plus vanco


Repeat SC and BC's


Wean remaining pressor gtt





Prognosis guarded





 


 Critical care time was 31 minutes of face-to-face evaluation and coordination 

of care








Subjective


Date of service: 08/22/18


Principal diagnosis: abd pain, sigmoid colon thickening


Interval history: 





No SOB , on SBT now 10/5 cm H2O. Some ETT,  discomfort





Objective


 Vital Signs - 12hr











  08/21/18 08/21/18 08/21/18





  21:15 21:30 21:45


 


Temperature   


 


Pulse Rate 98 H 103 H 96 H


 


Pulse Rate [   





Throughout]   


 


Respiratory 15 14 15





Rate   


 


Respiratory   





Rate [   





Throughout]   


 


Blood Pressure 114/48 113/48 110/48


 


O2 Sat by Pulse 99 99 99





Oximetry   














  08/21/18 08/21/18 08/21/18





  22:00 22:15 22:30


 


Temperature   


 


Pulse Rate 102 H 103 H 108 H


 


Pulse Rate [   





Throughout]   


 


Respiratory 15 17 14





Rate   


 


Respiratory   





Rate [   





Throughout]   


 


Blood Pressure 116/46 109/49 108/45


 


O2 Sat by Pulse 97 95 95





Oximetry   














  08/21/18 08/21/18 08/21/18





  22:45 23:00 23:06


 


Temperature   


 


Pulse Rate 97 H 100 H 109 H


 


Pulse Rate [   





Throughout]   


 


Respiratory 16 17 





Rate   


 


Respiratory   





Rate [   





Throughout]   


 


Blood Pressure 99/48 103/47 103/47


 


O2 Sat by Pulse 97 90 96





Oximetry   














  08/21/18 08/21/18 08/21/18





  23:15 23:30 23:45


 


Temperature   


 


Pulse Rate 104 H 103 H 100 H


 


Pulse Rate [   





Throughout]   


 


Respiratory 15 16 17





Rate   


 


Respiratory   





Rate [   





Throughout]   


 


Blood Pressure 115/45 107/45 104/83


 


O2 Sat by Pulse 99 94 





Oximetry   














  08/21/18 08/22/18 08/22/18





  23:54 00:00 00:15


 


Temperature 98.1 F  


 


Pulse Rate  101 H 101 H


 


Pulse Rate [   





Throughout]   


 


Respiratory  16 15





Rate   


 


Respiratory   





Rate [   





Throughout]   


 


Blood Pressure  101/42 104/43


 


O2 Sat by Pulse  95 95





Oximetry   














  08/22/18 08/22/18 08/22/18





  00:30 00:45 01:00


 


Temperature   


 


Pulse Rate 106 H 111 H 106 H


 


Pulse Rate [   





Throughout]   


 


Respiratory 15 16 17





Rate   


 


Respiratory   





Rate [   





Throughout]   


 


Blood Pressure 112/43 107/41 107/41


 


O2 Sat by Pulse 95 95 95





Oximetry   














  08/22/18 08/22/18 08/22/18





  01:15 01:30 01:45


 


Temperature   


 


Pulse Rate 107 H 111 H 106 H


 


Pulse Rate [   





Throughout]   


 


Respiratory 16 18 17





Rate   


 


Respiratory   





Rate [   





Throughout]   


 


Blood Pressure 98/41 107/43 111/40


 


O2 Sat by Pulse 94 95 94





Oximetry   














  08/22/18 08/22/18 08/22/18





  02:00 02:15 02:30


 


Temperature   


 


Pulse Rate 103 H 106 H 108 H


 


Pulse Rate [   





Throughout]   


 


Respiratory 17 18 18





Rate   


 


Respiratory   





Rate [   





Throughout]   


 


Blood Pressure 114/42 120/45 128/44


 


O2 Sat by Pulse 94 94 94





Oximetry   














  08/22/18 08/22/18 08/22/18





  02:45 03:00 03:15


 


Temperature   


 


Pulse Rate 105 H 110 H 118 H


 


Pulse Rate [   





Throughout]   


 


Respiratory 17 20 18





Rate   


 


Respiratory   





Rate [   





Throughout]   


 


Blood Pressure 108/44 111/47 111/52


 


O2 Sat by Pulse 94 94 94





Oximetry   














  08/22/18 08/22/18 08/22/18





  03:30 03:45 03:46


 


Temperature   98.2 F


 


Pulse Rate 103 H 103 H 


 


Pulse Rate [   





Throughout]   


 


Respiratory 20 20 





Rate   


 


Respiratory   





Rate [   





Throughout]   


 


Blood Pressure 104/43 97/41 


 


O2 Sat by Pulse 94 93 





Oximetry   














  08/22/18 08/22/18 08/22/18





  04:00 04:15 04:30


 


Temperature   


 


Pulse Rate 115 H 110 H 107 H


 


Pulse Rate [   





Throughout]   


 


Respiratory 18 17 18





Rate   


 


Respiratory   





Rate [   





Throughout]   


 


Blood Pressure 111/42 111/42 109/46


 


O2 Sat by Pulse 94 94 94





Oximetry   














  08/22/18 08/22/18 08/22/18





  04:35 04:45 05:00


 


Temperature   


 


Pulse Rate 108 H 111 H 107 H


 


Pulse Rate [   





Throughout]   


 


Respiratory  20 19





Rate   


 


Respiratory   





Rate [   





Throughout]   


 


Blood Pressure 109/46 105/42 105/42


 


O2 Sat by Pulse 95 94 95





Oximetry   














  08/22/18 08/22/18 08/22/18





  05:15 05:30 05:45


 


Temperature   


 


Pulse Rate 108 H 111 H 124 H


 


Pulse Rate [   





Throughout]   


 


Respiratory 20 21 21





Rate   


 


Respiratory   





Rate [   





Throughout]   


 


Blood Pressure 106/44 110/51 102/45


 


O2 Sat by Pulse 96 95 95





Oximetry   














  08/22/18 08/22/18 08/22/18





  06:00 06:15 06:30


 


Temperature   


 


Pulse Rate 106 H 117 H 106 H


 


Pulse Rate [   





Throughout]   


 


Respiratory 20 21 18





Rate   


 


Respiratory   





Rate [   





Throughout]   


 


Blood Pressure 115/50 113/44 114/49


 


O2 Sat by Pulse 95 96 94





Oximetry   














  08/22/18 08/22/18 08/22/18





  06:45 07:00 07:15


 


Temperature   


 


Pulse Rate 107 H 101 H 109 H


 


Pulse Rate [   105 H





Throughout]   


 


Respiratory 20 19 16





Rate   


 


Respiratory   16





Rate [   





Throughout]   


 


Blood Pressure 113/51 108/51 109/45


 


O2 Sat by Pulse 94 94 94





Oximetry   














  08/22/18 08/22/18 08/22/18





  07:30 07:45 08:00


 


Temperature   98.2 F


 


Pulse Rate 122 H 113 H 


 


Pulse Rate [ 110 H  





Throughout]   


 


Respiratory 17 19 





Rate   


 


Respiratory 18  





Rate [   





Throughout]   


 


Blood Pressure 95/36 108/45 


 


O2 Sat by Pulse 98 99 





Oximetry   














  08/22/18





  08:15


 


Temperature 


 


Pulse Rate 113 H


 


Pulse Rate [ 





Throughout] 


 


Respiratory 12





Rate 


 


Respiratory 





Rate [ 





Throughout] 


 


Blood Pressure 108/45


 


O2 Sat by Pulse 99





Oximetry 











Constitutional: no acute distress, alert


Eyes: non-icteric


ENT: other (orally intubated and not on sedation)


Neck: supple, no JVD


Ascultation: Bilateral: clear, diminished breath sounds


Percussion: Bilateral: not dull


Cardiovascular: irregular rhythm


Gastrointestinal: soft, tender (mild , no rebound), non-distended


Extremities: no cyanosis, no edema, pulses normal (diminished in lower extremity

), cool


Neurologic: pupils equal and round, CN II-XII normal, motor strength normal and

, other (appears mildly confused, but follows commands)


CBC and BMP: 


 08/21/18 13:35





 08/22/18 04:39


ABG, PT/INR, D-dimer: 


ABG











POC ABG pH  7.320  (7.35-7.45)  L  08/21/18  05:47    


 


POC ABG pCO2  31.4  (35-45)  L  08/21/18  05:47    


 


POC ABG pO2  81  ()   08/21/18  05:47    


 


POC ABG HCO3  16.2   08/21/18  05:47    


 


POC ABG Total CO2  17   08/21/18  05:47    


 


POC ABG O2 Sat  95   08/21/18  05:47    








Abnormal lab findings: 


 Abnormal Labs











  08/15/18 08/15/18 08/15/18





  14:01 15:05 15:05


 


WBC   18.7 H 


 


RBC   3.52 L 


 


Hgb   


 


Hct   


 


MCHC   


 


RDW   15.6 H 


 


Lymph % (Auto)   


 


Mono #   


 


Seg Neutrophils %   


 


Seg Neuts % (Manual)   87.0 H 


 


Lymphocytes % (Manual)   9.0 L 


 


Seg Neutrophils #   


 


Seg Neutrophils # Man   16.3 H 


 


Lymphocytes # (Manual)   


 


Monocytes # (Manual)   


 


POC ABG pH   


 


POC ABG pCO2   


 


POC ABG pO2   


 


Sodium    128 L


 


Potassium   


 


Chloride    89.6 L


 


Carbon Dioxide    20 L


 


BUN    54 H


 


Creatinine    4.9 H


 


Glucose    121 H


 


POC Glucose  153 H  


 


Calcium   


 


Total Creatine Kinase    387 H


 


Troponin T    0.053 H


 


Total Protein   


 


Albumin    2.8 L


 


LDL Cholesterol Direct    27 L


 


HDL Cholesterol    32 L


 


TSH   


 


Urine Creatinine   














  08/15/18 08/15/18 08/16/18





  15:05 21:45 02:51


 


WBC   


 


RBC   


 


Hgb   


 


Hct   


 


MCHC   


 


RDW   


 


Lymph % (Auto)   


 


Mono #   


 


Seg Neutrophils %   


 


Seg Neuts % (Manual)   


 


Lymphocytes % (Manual)   


 


Seg Neutrophils #   


 


Seg Neutrophils # Man   


 


Lymphocytes # (Manual)   


 


Monocytes # (Manual)   


 


POC ABG pH   


 


POC ABG pCO2   


 


POC ABG pO2   


 


Sodium    129 L


 


Potassium   


 


Chloride    95.0 L


 


Carbon Dioxide    17 L


 


BUN    57 H


 


Creatinine    4.8 H


 


Glucose    131 H


 


POC Glucose   235 H 


 


Calcium    7.4 L


 


Total Creatine Kinase   


 


Troponin T   


 


Total Protein   


 


Albumin   


 


LDL Cholesterol Direct   


 


HDL Cholesterol   


 


TSH  7.530 H  


 


Urine Creatinine   














  08/16/18 08/16/18 08/16/18





  06:53 06:59 07:33


 


WBC   


 


RBC   


 


Hgb   


 


Hct   


 


MCHC   


 


RDW   


 


Lymph % (Auto)   


 


Mono #   


 


Seg Neutrophils %   


 


Seg Neuts % (Manual)   


 


Lymphocytes % (Manual)   


 


Seg Neutrophils #   


 


Seg Neutrophils # Man   


 


Lymphocytes # (Manual)   


 


Monocytes # (Manual)   


 


POC ABG pH   


 


POC ABG pCO2   


 


POC ABG pO2   


 


Sodium   


 


Potassium   


 


Chloride   


 


Carbon Dioxide   


 


BUN   


 


Creatinine   


 


Glucose   228 H 


 


POC Glucose  < 40 L   184 H


 


Calcium   


 


Total Creatine Kinase   


 


Troponin T   


 


Total Protein   


 


Albumin   


 


LDL Cholesterol Direct   


 


HDL Cholesterol   


 


TSH   


 


Urine Creatinine   














  08/16/18 08/16/18 08/16/18





  10:59 11:45 16:48


 


WBC   


 


RBC   


 


Hgb   


 


Hct   


 


MCHC   


 


RDW   


 


Lymph % (Auto)   


 


Mono #   


 


Seg Neutrophils %   


 


Seg Neuts % (Manual)   


 


Lymphocytes % (Manual)   


 


Seg Neutrophils #   


 


Seg Neutrophils # Man   


 


Lymphocytes # (Manual)   


 


Monocytes # (Manual)   


 


POC ABG pH   


 


POC ABG pCO2   


 


POC ABG pO2   


 


Sodium   


 


Potassium   


 


Chloride   


 


Carbon Dioxide   


 


BUN   


 


Creatinine   


 


Glucose   


 


POC Glucose  234 H   117 H


 


Calcium   


 


Total Creatine Kinase   


 


Troponin T   


 


Total Protein   


 


Albumin   


 


LDL Cholesterol Direct   


 


HDL Cholesterol   


 


TSH   


 


Urine Creatinine   47.8 H 














  08/17/18 08/17/18 08/17/18





  06:17 06:17 07:40


 


WBC  12.8 H  


 


RBC  3.01 L  


 


Hgb  8.8 L  


 


Hct  27.4 L  


 


MCHC   


 


RDW  16.4 H  


 


Lymph % (Auto)   


 


Mono #   


 


Seg Neutrophils %   


 


Seg Neuts % (Manual)  86.0 H  


 


Lymphocytes % (Manual)  7.0 L  


 


Seg Neutrophils #   


 


Seg Neutrophils # Man  11.0 H  


 


Lymphocytes # (Manual)  0.9 L  


 


Monocytes # (Manual)  0.9 H  


 


POC ABG pH   


 


POC ABG pCO2   


 


POC ABG pO2   


 


Sodium   132 L 


 


Potassium   


 


Chloride   94.7 L 


 


Carbon Dioxide   12 L 


 


BUN   63 H 


 


Creatinine   5.6 H 


 


Glucose   225 H 


 


POC Glucose    241 H


 


Calcium   7.1 L 


 


Total Creatine Kinase   519 H 


 


Troponin T   


 


Total Protein   4.8 L D 


 


Albumin   2.0 L 


 


LDL Cholesterol Direct   


 


HDL Cholesterol   


 


TSH   


 


Urine Creatinine   














  08/17/18 08/17/18 08/18/18





  12:12 17:15 05:21


 


WBC   


 


RBC   


 


Hgb   


 


Hct   


 


MCHC   


 


RDW   


 


Lymph % (Auto)   


 


Mono #   


 


Seg Neutrophils %   


 


Seg Neuts % (Manual)   


 


Lymphocytes % (Manual)   


 


Seg Neutrophils #   


 


Seg Neutrophils # Man   


 


Lymphocytes # (Manual)   


 


Monocytes # (Manual)   


 


POC ABG pH   


 


POC ABG pCO2   


 


POC ABG pO2   


 


Sodium   


 


Potassium   


 


Chloride   


 


Carbon Dioxide   


 


BUN   


 


Creatinine   


 


Glucose   


 


POC Glucose  237 H  165 H  318 H


 


Calcium   


 


Total Creatine Kinase   


 


Troponin T   


 


Total Protein   


 


Albumin   


 


LDL Cholesterol Direct   


 


HDL Cholesterol   


 


TSH   


 


Urine Creatinine   














  08/18/18 08/18/18 08/18/18





  06:28 07:17 08:34


 


WBC   


 


RBC   


 


Hgb   


 


Hct   


 


MCHC   


 


RDW   


 


Lymph % (Auto)   


 


Mono #   


 


Seg Neutrophils %   


 


Seg Neuts % (Manual)   


 


Lymphocytes % (Manual)   


 


Seg Neutrophils #   


 


Seg Neutrophils # Man   


 


Lymphocytes # (Manual)   


 


Monocytes # (Manual)   


 


POC ABG pH    7.037 L


 


POC ABG pCO2    31.9 L


 


POC ABG pO2    303 H


 


Sodium  136 L  


 


Potassium  6.2 H* D  


 


Chloride   


 


Carbon Dioxide  7 L*  


 


BUN  72 H  


 


Creatinine  6.3 H  


 


Glucose  253 H  


 


POC Glucose   292 H 


 


Calcium  7.1 L  


 


Total Creatine Kinase   


 


Troponin T   


 


Total Protein   


 


Albumin   


 


LDL Cholesterol Direct   


 


HDL Cholesterol   


 


TSH   


 


Urine Creatinine   














  08/18/18 08/18/18 08/18/18





  09:37 11:57 12:33


 


WBC   


 


RBC   


 


Hgb   


 


Hct   


 


MCHC   


 


RDW   


 


Lymph % (Auto)   


 


Mono #   


 


Seg Neutrophils %   


 


Seg Neuts % (Manual)   


 


Lymphocytes % (Manual)   


 


Seg Neutrophils #   


 


Seg Neutrophils # Man   


 


Lymphocytes # (Manual)   


 


Monocytes # (Manual)   


 


POC ABG pH   


 


POC ABG pCO2   


 


POC ABG pO2   


 


Sodium   


 


Potassium   


 


Chloride   


 


Carbon Dioxide   


 


BUN   


 


Creatinine   


 


Glucose   


 


POC Glucose  414 H  379 H 


 


Calcium   


 


Total Creatine Kinase   


 


Troponin T    0.206 H* D


 


Total Protein   


 


Albumin   


 


LDL Cholesterol Direct   


 


HDL Cholesterol   


 


TSH   


 


Urine Creatinine   














  08/18/18 08/18/18 08/18/18





  12:33 14:23 17:34


 


WBC   


 


RBC   


 


Hgb   


 


Hct   


 


MCHC   


 


RDW   


 


Lymph % (Auto)   


 


Mono #   


 


Seg Neutrophils %   


 


Seg Neuts % (Manual)   


 


Lymphocytes % (Manual)   


 


Seg Neutrophils #   


 


Seg Neutrophils # Man   


 


Lymphocytes # (Manual)   


 


Monocytes # (Manual)   


 


POC ABG pH   


 


POC ABG pCO2   


 


POC ABG pO2   


 


Sodium  132 L   134 L


 


Potassium  5.9 H  


 


Chloride   


 


Carbon Dioxide  11 L   14 L


 


BUN  73 H   71 H


 


Creatinine  6.1 H   6.0 H


 


Glucose  336 H   225 H


 


POC Glucose   306 H 


 


Calcium  7.0 L   6.8 L


 


Total Creatine Kinase   


 


Troponin T   


 


Total Protein   


 


Albumin   


 


LDL Cholesterol Direct   


 


HDL Cholesterol   


 


TSH   


 


Urine Creatinine   














  08/19/18 08/19/18 08/19/18





  04:00 04:30 04:30


 


WBC    19.3 H


 


RBC    3.05 L


 


Hgb    8.8 L


 


Hct    27.1 L


 


MCHC   


 


RDW    16.6 H


 


Lymph % (Auto)    7.9 L


 


Mono #    1.2 H


 


Seg Neutrophils %    85.2 H


 


Seg Neuts % (Manual)   


 


Lymphocytes % (Manual)   


 


Seg Neutrophils #    16.4 H


 


Seg Neutrophils # Man   


 


Lymphocytes # (Manual)   


 


Monocytes # (Manual)   


 


POC ABG pH  7.274 L  


 


POC ABG pCO2  33.6 L  


 


POC ABG pO2  129 H  


 


Sodium   


 


Potassium   


 


Chloride   


 


Carbon Dioxide   15 L 


 


BUN   71 H 


 


Creatinine   5.8 H 


 


Glucose   135 H 


 


POC Glucose   


 


Calcium   6.6 L 


 


Total Creatine Kinase   


 


Troponin T   


 


Total Protein   


 


Albumin   


 


LDL Cholesterol Direct   


 


HDL Cholesterol   


 


TSH   


 


Urine Creatinine   














  08/19/18 08/19/18 08/19/18





  07:52 16:31 21:51


 


WBC   


 


RBC   


 


Hgb   


 


Hct   


 


MCHC   


 


RDW   


 


Lymph % (Auto)   


 


Mono #   


 


Seg Neutrophils %   


 


Seg Neuts % (Manual)   


 


Lymphocytes % (Manual)   


 


Seg Neutrophils #   


 


Seg Neutrophils # Man   


 


Lymphocytes # (Manual)   


 


Monocytes # (Manual)   


 


POC ABG pH   


 


POC ABG pCO2   


 


POC ABG pO2   


 


Sodium   


 


Potassium   


 


Chloride   


 


Carbon Dioxide   


 


BUN   


 


Creatinine   


 


Glucose   


 


POC Glucose  191 H  239 H  241 H


 


Calcium   


 


Total Creatine Kinase   


 


Troponin T   


 


Total Protein   


 


Albumin   


 


LDL Cholesterol Direct   


 


HDL Cholesterol   


 


TSH   


 


Urine Creatinine   














  08/20/18 08/20/18 08/20/18





  05:27 06:55 12:25


 


WBC   


 


RBC   


 


Hgb   


 


Hct   


 


MCHC   


 


RDW   


 


Lymph % (Auto)   


 


Mono #   


 


Seg Neutrophils %   


 


Seg Neuts % (Manual)   


 


Lymphocytes % (Manual)   


 


Seg Neutrophils #   


 


Seg Neutrophils # Man   


 


Lymphocytes # (Manual)   


 


Monocytes # (Manual)   


 


POC ABG pH  7.303 L  


 


POC ABG pCO2  30.3 L  


 


POC ABG pO2  72 L  


 


Sodium   


 


Potassium   


 


Chloride   112.0 H 


 


Carbon Dioxide   14 L 


 


BUN   72 H 


 


Creatinine   5.6 H 


 


Glucose   230 H 


 


POC Glucose    279 H


 


Calcium   6.1 L 


 


Total Creatine Kinase   


 


Troponin T   


 


Total Protein   


 


Albumin   


 


LDL Cholesterol Direct   


 


HDL Cholesterol   


 


TSH   


 


Urine Creatinine   














  08/21/18 08/21/18 08/21/18





  04:01 04:01 05:47


 


WBC   19.9 H 


 


RBC   3.14 L 


 


Hgb   9.2 L 


 


Hct   27.5 L 


 


MCHC   


 


RDW   17.0 H 


 


Lymph % (Auto)   


 


Mono #   


 


Seg Neutrophils %   


 


Seg Neuts % (Manual)   73.0 H 


 


Lymphocytes % (Manual)   5.0 L 


 


Seg Neutrophils #   


 


Seg Neutrophils # Man   14.5 H 


 


Lymphocytes # (Manual)   1.0 L 


 


Monocytes # (Manual)   


 


POC ABG pH    7.320 L


 


POC ABG pCO2    31.4 L


 


POC ABG pO2   


 


Sodium  146 H  


 


Potassium   


 


Chloride  111.2 H  


 


Carbon Dioxide  16 L  


 


BUN  79 H  


 


Creatinine  5.7 H  


 


Glucose  253 H  


 


POC Glucose   


 


Calcium  6.3 L  


 


Total Creatine Kinase   


 


Troponin T   


 


Total Protein   


 


Albumin   


 


LDL Cholesterol Direct   


 


HDL Cholesterol   


 


TSH   


 


Urine Creatinine   














  08/21/18 08/22/18





  13:35 04:39


 


WBC  20.7 H 


 


RBC  3.14 L 


 


Hgb  9.4 L 


 


Hct  27.1 L 


 


MCHC  35 H 


 


RDW  17.1 H 


 


Lymph % (Auto)  


 


Mono #  


 


Seg Neutrophils %  


 


Seg Neuts % (Manual)  


 


Lymphocytes % (Manual)  


 


Seg Neutrophils #  


 


Seg Neutrophils # Man  


 


Lymphocytes # (Manual)  


 


Monocytes # (Manual)  


 


POC ABG pH  


 


POC ABG pCO2  


 


POC ABG pO2  


 


Sodium  


 


Potassium   2.9 L*


 


Chloride  


 


Carbon Dioxide   15 L


 


BUN   87 H


 


Creatinine   5.9 H


 


Glucose   269 H


 


POC Glucose  


 


Calcium   6.4 L


 


Total Creatine Kinase  


 


Troponin T  


 


Total Protein  


 


Albumin  


 


LDL Cholesterol Direct  


 


HDL Cholesterol  


 


TSH  


 


Urine Creatinine

## 2018-08-22 NOTE — PROGRESS NOTE
Assessment and Plan





1. Acute kidney injury:


FELICIA superimposed on CKD in the setting of volume depletion, hypotension and 

Cardiac arrest.


Monitor.


Renal prognosis is guarded.


No dialysis per family.


Continue IV fluids.





2. Electrolytes:


Hyperkalemia, improved.


Anion gap metabolic acidosis, monitor.





3. S/p PEA.





4. Hypotension:


On Levophed.





5. Respiratory failure:


On vent.





6. Sepsis.





7. A.fib.





8. Anemia.





Subjective


Date of service: 08/22/18


Principal diagnosis: abd pain, sigmoid colon thickening


Interval history: 


Patient was seen and examined at the bedside.





Objective





- Vital Signs


Vital signs: 


 Vital Signs - 12hr











  08/21/18 08/21/18 08/21/18





  22:00 22:15 22:30


 


Temperature   


 


Pulse Rate 102 H 103 H 108 H


 


Pulse Rate [   





Throughout]   


 


Respiratory 15 17 14





Rate   


 


Respiratory   





Rate [   





Throughout]   


 


Blood Pressure 116/46 109/49 108/45


 


O2 Sat by Pulse 97 95 95





Oximetry   














  08/21/18 08/21/18 08/21/18





  22:45 23:00 23:06


 


Temperature   


 


Pulse Rate 97 H 100 H 109 H


 


Pulse Rate [   





Throughout]   


 


Respiratory 16 17 





Rate   


 


Respiratory   





Rate [   





Throughout]   


 


Blood Pressure 99/48 103/47 103/47


 


O2 Sat by Pulse 97 90 96





Oximetry   














  08/21/18 08/21/18 08/21/18





  23:15 23:30 23:45


 


Temperature   


 


Pulse Rate 104 H 103 H 100 H


 


Pulse Rate [   





Throughout]   


 


Respiratory 15 16 17





Rate   


 


Respiratory   





Rate [   





Throughout]   


 


Blood Pressure 115/45 107/45 104/83


 


O2 Sat by Pulse 99 94 





Oximetry   














  08/21/18 08/22/18 08/22/18





  23:54 00:00 00:15


 


Temperature 98.1 F  


 


Pulse Rate  101 H 101 H


 


Pulse Rate [   





Throughout]   


 


Respiratory  16 15





Rate   


 


Respiratory   





Rate [   





Throughout]   


 


Blood Pressure  101/42 104/43


 


O2 Sat by Pulse  95 95





Oximetry   














  08/22/18 08/22/18 08/22/18





  00:30 00:45 01:00


 


Temperature   


 


Pulse Rate 106 H 111 H 106 H


 


Pulse Rate [   





Throughout]   


 


Respiratory 15 16 17





Rate   


 


Respiratory   





Rate [   





Throughout]   


 


Blood Pressure 112/43 107/41 107/41


 


O2 Sat by Pulse 95 95 95





Oximetry   














  08/22/18 08/22/18 08/22/18





  01:15 01:30 01:45


 


Temperature   


 


Pulse Rate 107 H 111 H 106 H


 


Pulse Rate [   





Throughout]   


 


Respiratory 16 18 17





Rate   


 


Respiratory   





Rate [   





Throughout]   


 


Blood Pressure 98/41 107/43 111/40


 


O2 Sat by Pulse 94 95 94





Oximetry   














  08/22/18 08/22/18 08/22/18





  02:00 02:15 02:30


 


Temperature   


 


Pulse Rate 103 H 106 H 108 H


 


Pulse Rate [   





Throughout]   


 


Respiratory 17 18 18





Rate   


 


Respiratory   





Rate [   





Throughout]   


 


Blood Pressure 114/42 120/45 128/44


 


O2 Sat by Pulse 94 94 94





Oximetry   














  08/22/18 08/22/18 08/22/18





  02:45 03:00 03:15


 


Temperature   


 


Pulse Rate 105 H 110 H 118 H


 


Pulse Rate [   





Throughout]   


 


Respiratory 17 20 18





Rate   


 


Respiratory   





Rate [   





Throughout]   


 


Blood Pressure 108/44 111/47 111/52


 


O2 Sat by Pulse 94 94 94





Oximetry   














  08/22/18 08/22/18 08/22/18





  03:30 03:45 03:46


 


Temperature   98.2 F


 


Pulse Rate 103 H 103 H 


 


Pulse Rate [   





Throughout]   


 


Respiratory 20 20 





Rate   


 


Respiratory   





Rate [   





Throughout]   


 


Blood Pressure 104/43 97/41 


 


O2 Sat by Pulse 94 93 





Oximetry   














  08/22/18 08/22/18 08/22/18





  04:00 04:15 04:30


 


Temperature   


 


Pulse Rate 115 H 110 H 107 H


 


Pulse Rate [   





Throughout]   


 


Respiratory 18 17 18





Rate   


 


Respiratory   





Rate [   





Throughout]   


 


Blood Pressure 111/42 111/42 109/46


 


O2 Sat by Pulse 94 94 94





Oximetry   














  08/22/18 08/22/18 08/22/18





  04:35 04:45 05:00


 


Temperature   


 


Pulse Rate 108 H 111 H 107 H


 


Pulse Rate [   





Throughout]   


 


Respiratory  20 19





Rate   


 


Respiratory   





Rate [   





Throughout]   


 


Blood Pressure 109/46 105/42 105/42


 


O2 Sat by Pulse 95 94 95





Oximetry   














  08/22/18 08/22/18 08/22/18





  05:15 05:30 05:45


 


Temperature   


 


Pulse Rate 108 H 111 H 124 H


 


Pulse Rate [   





Throughout]   


 


Respiratory 20 21 21





Rate   


 


Respiratory   





Rate [   





Throughout]   


 


Blood Pressure 106/44 110/51 102/45


 


O2 Sat by Pulse 96 95 95





Oximetry   














  08/22/18 08/22/18 08/22/18





  06:00 06:15 06:30


 


Temperature   


 


Pulse Rate 106 H 117 H 106 H


 


Pulse Rate [   





Throughout]   


 


Respiratory 20 21 18





Rate   


 


Respiratory   





Rate [   





Throughout]   


 


Blood Pressure 115/50 113/44 114/49


 


O2 Sat by Pulse 95 96 94





Oximetry   














  08/22/18 08/22/18 08/22/18





  06:45 07:00 07:15


 


Temperature   


 


Pulse Rate 107 H 101 H 109 H


 


Pulse Rate [   105 H





Throughout]   


 


Respiratory 20 19 16





Rate   


 


Respiratory   16





Rate [   





Throughout]   


 


Blood Pressure 113/51 108/51 109/45


 


O2 Sat by Pulse 94 94 94





Oximetry   














  08/22/18 08/22/18 08/22/18





  07:30 07:45 08:00


 


Temperature   98.2 F


 


Pulse Rate 122 H 113 H 


 


Pulse Rate [ 110 H  





Throughout]   


 


Respiratory 17 19 





Rate   


 


Respiratory 18  





Rate [   





Throughout]   


 


Blood Pressure 95/36 108/45 


 


O2 Sat by Pulse 98 99 





Oximetry   














  08/22/18





  08:15


 


Temperature 


 


Pulse Rate 113 H


 


Pulse Rate [ 





Throughout] 


 


Respiratory 12





Rate 


 


Respiratory 





Rate [ 





Throughout] 


 


Blood Pressure 108/45


 


O2 Sat by Pulse 99





Oximetry 














- General Appearance


General appearance: well-developed, appears stated age, intubated, other (on 

vent)


EENT: ATNC, sclerotic cornea (left eye)


Neck: supple


Respiratory: Present: Other (coarse breath sounds)


Cardiology: irregularly irregular, tachycardia, S1S2


Gastrointestinal: normoactive bowel sounds, no tenderness


Integumentary: no rash, warm and dry


Neurologic: other (barely able to opens eyes)


Musculoskeletal: other (no edema)





- Lab





 08/22/18 18:15





 08/22/18 18:15


 Most recent lab results











Calcium  6.4 mg/dL (8.4-10.2)  L  08/22/18  04:39    


 


Magnesium  2.20 mg/dL (1.7-2.3)   08/17/18  06:17    


 


Urine Creatinine  47.8 mg/dL (0.1-20.0)  H  08/16/18  11:45    


 


Urine Sodium  27 mmol/L  08/16/18  11:45

## 2018-08-22 NOTE — PROGRESS NOTE
Assessment and Plan





/PEA Cardiac arrest s/p CPR


Continue current management, cardiology following





/Sepsis ; likely from colitis vs PNA


Continue empiric antibiotics, negative blood urine cultures, cont IV fluids and 

supportive care


Sputun cx positive for candida, placed on diflucan - d/reina by CC attending


Now placed on cefepime


WBc count cont to trend up, will discuss with ID





/Hyperkalemia; Likely from CKD


s/p calcium gluconate, insulin and IV D50


Nephrology following, resolved





/Acute hypoxic respiratory failure; intubated on ventilatory support


Nebulizers, wean as tolerated and extubate, pulmonary critical consulted 


Patient getting CPAP trial daily





/History of COPD/?interstitial lung disease; scheduled nebs 





/Hypotension/septic shock; 


IV fluids, placed on Levophed per protocol, titrate blood pressures , systolic 

more than 100


intermittently requiring Levophed, wean off as tolerated





/DM type 2, cont on insulin, accue check q6h





/Failure to thrive in an adult; 


Presented with Poor oral intake, no appetite, 


Started on feeding per TF protocol 





/Severe malnutrition: Nutrition supplements, nutrition consult ,supportive care





/Acute kidney injury; secondary to ATN


Worsening renal function, IV fluids, bicarbonate, nephrology  following


Patient family refusing HD





/History of coronary artery disease status post CABG; recent negative stress 

test


Continue current cardiac medications, cardiology following





/Atrial fibrillation; continue beta blockers,


placed on no anticoagulation as her episode following admission lasted less 

than 12 hours 


now having paroxysmal Atrial Fib, will reconsult cardiology for AC 

recommendation





/Hypothyroidism; continue Synthroid, thyroid panel reviewed





/Abdominal pain with Colitis ;


CT abdomen; thickening of the sigmoid colon, GI advised C. difficile studies - 

was not sent as symptom resolved


on Flagyl, possible colonoscopy in the future as needed


stop isolation





/DVT prophylaxis; SCDs, Lovenox renal dose





Patient is critically ill with poor prognosis


Patient's condition, recent events, poor prognosis discussed in detail with 

 and her daughter, They requested DO NOT RESUSCITATE, and to continue 

full treatment. Now refusing HD, patient will need hospice if no renal function 

improvement. 





Patient is DNR status





Critical care time 35 minutes








The high probability of a clinically significant, sudden or life threatening 

deterioration of the [multiple] system(s) required my full and direct attention

, intervention and personal management. The aggregate critical care time was [35

] minutes. This time is in addition to time spent performing reported 

procedures but includes the following: 





[x] Data Review and interpretation 





[x] Patient assessment and monitoring of vital signs 





[x] Documentation 





[x] Medication orders and management








Brief history: 


89-year-old very frail and malnourished and cachectic female patient with 

history of coronary artery disease status post CABG, peripheral vascular 

disease chronic renal insufficiency chronic back pain, was admitted through 

emergency room with altered level of consciousness, poor oral intake and 

failure to thrive, acute on chronic kidney disease, and sepsis by protocol, 

atrial fib with RVR. Had cardiac arrest on 08/18/18 morning status post CPR and 

intubation. Patient had severe bradycardia and noted PEA on monitor, CODE BLUE 

was called, intubated and CPR was done per ACLS protocol by ER physician Dr. Casper,Transferred to ICU. Her k level was also noted 6.2.











 Hospitalist Physical 





General appearance: Present:  cachectic, disheveled, intubated





- EENT


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





- Neck


Neck: Present: supple, other (teaching in place)





- Respiratory


Respiratory effort: labored


Respiratory: bilateral: diminished, rhonchi, negative: rales, wheezing





- Cardiovascular


Rhythm: regular


Heart Sounds: Present: S1 & S2





- Extremities


Extremities: no ischemia


Extremity abnormal: edema (3+ edema)





- Abdominal


General gastrointestinal: soft, non-tender, non-distended, normal bowel sounds





- Integumentary


Integumentary: Present: clear, warm





- Psychiatric


Psychiatric: other (intubated unresponsive)





- Neurologic


Neurologic: other (intubated Unresponsive)








Subjective


Date of service: 08/22/18


Principal diagnosis: abd pain, sigmoid colon thickening


Interval history: 





Patient is orally intubated on ventilatory support


Critically ill-looking, pale, cachectic and malnourished


Vital signs reviewed, discussed with family and RN at bedside


on levophed, unable to wean off


family refused HD











Objective





- Constitutional


Vitals: 


 Vital Signs - 12hr











  08/22/18 08/22/18 08/22/18





  00:45 01:00 01:15


 


Temperature   


 


Pulse Rate 111 H 106 H 107 H


 


Pulse Rate [   





Throughout]   


 


Respiratory 16 17 16





Rate   


 


Respiratory   





Rate [   





Throughout]   


 


Blood Pressure 107/41 107/41 98/41


 


O2 Sat by Pulse 95 95 94





Oximetry   














  08/22/18 08/22/18 08/22/18





  01:30 01:45 02:00


 


Temperature   


 


Pulse Rate 111 H 106 H 103 H


 


Pulse Rate [   





Throughout]   


 


Respiratory 18 17 17





Rate   


 


Respiratory   





Rate [   





Throughout]   


 


Blood Pressure 107/43 111/40 114/42


 


O2 Sat by Pulse 95 94 94





Oximetry   














  08/22/18 08/22/18 08/22/18





  02:15 02:30 02:45


 


Temperature   


 


Pulse Rate 106 H 108 H 105 H


 


Pulse Rate [   





Throughout]   


 


Respiratory 18 18 17





Rate   


 


Respiratory   





Rate [   





Throughout]   


 


Blood Pressure 120/45 128/44 108/44


 


O2 Sat by Pulse 94 94 94





Oximetry   














  08/22/18 08/22/18 08/22/18





  03:00 03:15 03:30


 


Temperature   


 


Pulse Rate 110 H 118 H 103 H


 


Pulse Rate [   





Throughout]   


 


Respiratory 20 18 20





Rate   


 


Respiratory   





Rate [   





Throughout]   


 


Blood Pressure 111/47 111/52 104/43


 


O2 Sat by Pulse 94 94 94





Oximetry   














  08/22/18 08/22/18 08/22/18





  03:45 03:46 04:00


 


Temperature  98.2 F 


 


Pulse Rate 103 H  115 H


 


Pulse Rate [   





Throughout]   


 


Respiratory 20  18





Rate   


 


Respiratory   





Rate [   





Throughout]   


 


Blood Pressure 97/41  111/42


 


O2 Sat by Pulse 93  94





Oximetry   














  08/22/18 08/22/18 08/22/18





  04:15 04:30 04:35


 


Temperature   


 


Pulse Rate 110 H 107 H 108 H


 


Pulse Rate [   





Throughout]   


 


Respiratory 17 18 





Rate   


 


Respiratory   





Rate [   





Throughout]   


 


Blood Pressure 111/42 109/46 109/46


 


O2 Sat by Pulse 94 94 95





Oximetry   














  08/22/18 08/22/18 08/22/18





  04:45 05:00 05:15


 


Temperature   


 


Pulse Rate 111 H 107 H 108 H


 


Pulse Rate [   





Throughout]   


 


Respiratory 20 19 20





Rate   


 


Respiratory   





Rate [   





Throughout]   


 


Blood Pressure 105/42 105/42 106/44


 


O2 Sat by Pulse 94 95 96





Oximetry   














  08/22/18 08/22/18 08/22/18





  05:30 05:45 06:00


 


Temperature   


 


Pulse Rate 111 H 124 H 106 H


 


Pulse Rate [   





Throughout]   


 


Respiratory 21 21 20





Rate   


 


Respiratory   





Rate [   





Throughout]   


 


Blood Pressure 110/51 102/45 115/50


 


O2 Sat by Pulse 95 95 95





Oximetry   














  08/22/18 08/22/18 08/22/18





  06:15 06:30 06:45


 


Temperature   


 


Pulse Rate 117 H 106 H 107 H


 


Pulse Rate [   





Throughout]   


 


Respiratory 21 18 20





Rate   


 


Respiratory   





Rate [   





Throughout]   


 


Blood Pressure 113/44 114/49 113/51


 


O2 Sat by Pulse 96 94 94





Oximetry   














  08/22/18 08/22/18 08/22/18





  07:00 07:15 07:30


 


Temperature   


 


Pulse Rate 101 H 109 H 122 H


 


Pulse Rate [  105 H 110 H





Throughout]   


 


Respiratory 19 16 17





Rate   


 


Respiratory  16 18





Rate [   





Throughout]   


 


Blood Pressure 108/51 109/45 95/36


 


O2 Sat by Pulse 94 94 98





Oximetry   














  08/22/18 08/22/18 08/22/18





  07:45 08:00 08:15


 


Temperature  98.2 F 


 


Pulse Rate 113 H 129 H 116 H


 


Pulse Rate [   





Throughout]   


 


Respiratory 19 19 18





Rate   


 


Respiratory   





Rate [   





Throughout]   


 


Blood Pressure 108/45 107/47 108/45


 


O2 Sat by Pulse 99 98 99





Oximetry   














  08/22/18 08/22/18 08/22/18





  08:30 08:45 09:00


 


Temperature   


 


Pulse Rate 113 H 109 H 108 H


 


Pulse Rate [   





Throughout]   


 


Respiratory 18 19 19





Rate   


 


Respiratory   





Rate [   





Throughout]   


 


Blood Pressure 100/44 93/45 112/47


 


O2 Sat by Pulse 99 94 94





Oximetry   














  08/22/18 08/22/18 08/22/18





  09:15 09:30 09:45


 


Temperature   


 


Pulse Rate 107 H 110 H 125 H


 


Pulse Rate [   





Throughout]   


 


Respiratory 18 23 19





Rate   


 


Respiratory   





Rate [   





Throughout]   


 


Blood Pressure 110/47 111/53 111/49


 


O2 Sat by Pulse 94 94 94





Oximetry   














  08/22/18 08/22/18 08/22/18





  10:00 10:15 10:31


 


Temperature   


 


Pulse Rate 120 H 119 H 122 H


 


Pulse Rate [   





Throughout]   


 


Respiratory 19 19 25 H





Rate   


 


Respiratory   





Rate [   





Throughout]   


 


Blood Pressure 94/43 107/53 90/64


 


O2 Sat by Pulse 93 94 87





Oximetry   














  08/22/18 08/22/18 08/22/18





  10:45 10:52 11:00


 


Temperature   


 


Pulse Rate 108 H 122 H 116 H


 


Pulse Rate [   





Throughout]   


 


Respiratory 19 25 H 17





Rate   


 


Respiratory   





Rate [   





Throughout]   


 


Blood Pressure 110/49 90/64 118/47


 


O2 Sat by Pulse 94 87 93





Oximetry   














  08/22/18 08/22/18 08/22/18





  11:15 11:30 11:45


 


Temperature   


 


Pulse Rate 114 H 107 H 125 H


 


Pulse Rate [   





Throughout]   


 


Respiratory 19 19 20





Rate   


 


Respiratory   





Rate [   





Throughout]   


 


Blood Pressure 107/45 124/49 122/52


 


O2 Sat by Pulse 94 94 94





Oximetry   














  08/22/18 08/22/18





  12:00 12:15


 


Temperature  


 


Pulse Rate 113 H 111 H


 


Pulse Rate [  





Throughout]  


 


Respiratory 20 19





Rate  


 


Respiratory  





Rate [  





Throughout]  


 


Blood Pressure 118/43 110/51


 


O2 Sat by Pulse 94 93





Oximetry  














- Labs


CBC & Chem 7: 


 08/23/18 05:40





 08/23/18 05:40


Labs: 


 Abnormal lab results











  08/21/18 08/22/18 Range/Units





  13:35 04:39 


 


WBC  20.7 H   (4.5-11.0)  K/mm3


 


RBC  3.14 L   (3.65-5.03)  M/mm3


 


Hgb  9.4 L   (10.1-14.3)  gm/dl


 


Hct  27.1 L   (30.3-42.9)  %


 


MCHC  35 H   (30-34)  %


 


RDW  17.1 H   (13.2-15.2)  %


 


Potassium   2.9 L*  (3.6-5.0)  mmol/L


 


Carbon Dioxide   15 L  (22-30)  mmol/L


 


BUN   87 H  (7-17)  mg/dL


 


Creatinine   5.9 H  (0.7-1.2)  mg/dL


 


Glucose   269 H  ()  mg/dL


 


Calcium   6.4 L  (8.4-10.2)  mg/dL

## 2018-08-23 VITALS — SYSTOLIC BLOOD PRESSURE: 115 MMHG | DIASTOLIC BLOOD PRESSURE: 50 MMHG

## 2018-08-23 LAB
BUN SERPL-MCNC: 93 MG/DL (ref 7–17)
BUN/CREAT SERPL: 16 %
CALCIUM SERPL-MCNC: 6 MG/DL (ref 8.4–10.2)
HCT VFR BLD CALC: 24.9 % (ref 30.3–42.9)
HEMOLYSIS INDEX: 5
HGB BLD-MCNC: 8 GM/DL (ref 10.1–14.3)
MCH RBC QN AUTO: 28 PG (ref 28–32)
MCHC RBC AUTO-ENTMCNC: 32 % (ref 30–34)
MCV RBC AUTO: 88 FL (ref 79–97)
PLATELET # BLD: 153 K/MM3 (ref 140–440)
RBC # BLD AUTO: 2.84 M/MM3 (ref 3.65–5.03)

## 2018-08-23 RX ADMIN — NOREPINEPHRINE BITARTRATE SCH MLS/HR: 16 INJECTION, SOLUTION INTRAVENOUS at 04:36

## 2018-08-23 RX ADMIN — LEVOTHYROXINE SODIUM SCH MCG: 0.05 TABLET ORAL at 05:57

## 2018-08-23 RX ADMIN — METRONIDAZOLE SCH MLS/HR: 5 INJECTION, SOLUTION INTRAVENOUS at 05:35

## 2018-08-23 RX ADMIN — NOREPINEPHRINE BITARTRATE SCH MLS/HR: 16 INJECTION, SOLUTION INTRAVENOUS at 06:17

## 2018-08-23 RX ADMIN — IPRATROPIUM BROMIDE AND ALBUTEROL SULFATE SCH AMPUL: .5; 3 SOLUTION RESPIRATORY (INHALATION) at 09:38

## 2018-08-23 RX ADMIN — IPRATROPIUM BROMIDE AND ALBUTEROL SULFATE SCH: .5; 3 SOLUTION RESPIRATORY (INHALATION) at 18:02

## 2018-08-23 NOTE — PROGRESS NOTE
Assessment and Plan








Sepsis- big jump in WBC, despite new ABX yesterday. possible abdomina,colitis,

ischemia?


PEA/cardiac arrest


Acute respiratory failure 


Hypoxemic/metabolic encephalopathy. 


Atelectasis LLL infiltrate/new w effusion. Active pneumonia?


Metabolic AG acidosis.Worsening due to the above, no HD per family


Uncontrolled DM, Sepsis


FELICIA. Patient family refused HD per notes,worsening


Dementia











Recommendations








Continue Flagyl, Cefepime plus vanco


Ask ID to review ABX's


Check SC and BC's


Continue pressors gtt


f/u ABG, discuss HCO3 Tx depending on findings,family orders


At this point, sadly, patient is making a turn for the worse. Family refused 

additional support Tx- HD. Discussing with family today, extend of care- HCO3, 

more aggressive ABX vs comfort care/pain control.





Prognosis poor





 


 Critical care time was 31 minutes of face-to-face evaluation and coordination 

of care








Subjective


Date of service: 08/23/18


Principal diagnosis: Sepsis,respiratory failure, abd pain, sigmoid colon 

thickening


Interval history: 





abd pain on exam





Objective


 Vital Signs - 12hr











  08/22/18 08/22/18 08/22/18





  22:15 22:30 22:45


 


Temperature   


 


Pulse Rate 120 H 173 H 135 H


 


Pulse Rate [   





Apical]   


 


Pulse Rate [   





Bilateral   





Throughout]   


 


Pulse Rate [   





Left Upper Lobe   





]   


 


Respiratory 27 H 24 24





Rate   


 


Respiratory   





Rate [Bilateral   





Throughout]   


 


Respiratory   





Rate [Left   





Upper Lobe]   


 


Blood Pressure 82/36 90/40 90/40


 


O2 Sat by Pulse 94 93 93





Oximetry   














  08/22/18 08/22/18 08/22/18





  23:00 23:15 23:30


 


Temperature   


 


Pulse Rate 154 H 122 H 130 H


 


Pulse Rate [   





Apical]   


 


Pulse Rate [   





Bilateral   





Throughout]   


 


Pulse Rate [   





Left Upper Lobe   





]   


 


Respiratory 23 26 H 25 H





Rate   


 


Respiratory   





Rate [Bilateral   





Throughout]   


 


Respiratory   





Rate [Left   





Upper Lobe]   


 


Blood Pressure 106/50 91/49 98/44


 


O2 Sat by Pulse 94 94 93





Oximetry   














  08/22/18 08/23/18 08/23/18





  23:45 00:00 00:03


 


Temperature  99.3 F 98.7 F


 


Pulse Rate 132 H 135 H 


 


Pulse Rate [  118 H 





Apical]   


 


Pulse Rate [   





Bilateral   





Throughout]   


 


Pulse Rate [   





Left Upper Lobe   





]   


 


Respiratory 25 H 24 





Rate   


 


Respiratory   





Rate [Bilateral   





Throughout]   


 


Respiratory   





Rate [Left   





Upper Lobe]   


 


Blood Pressure 100/52 102/51 


 


O2 Sat by Pulse 94 95 





Oximetry   














  08/23/18 08/23/18 08/23/18





  00:15 00:24 00:30


 


Temperature   


 


Pulse Rate 145 H 123 H 144 H


 


Pulse Rate [   





Apical]   


 


Pulse Rate [   





Bilateral   





Throughout]   


 


Pulse Rate [   





Left Upper Lobe   





]   


 


Respiratory 24  26 H





Rate   


 


Respiratory   





Rate [Bilateral   





Throughout]   


 


Respiratory   





Rate [Left   





Upper Lobe]   


 


Blood Pressure 107/44 107/44 99/51


 


O2 Sat by Pulse 94 94 94





Oximetry   














  08/23/18 08/23/18 08/23/18





  00:45 01:00 01:15


 


Temperature   


 


Pulse Rate 123 H 125 H 118 H


 


Pulse Rate [   





Apical]   


 


Pulse Rate [   





Bilateral   





Throughout]   


 


Pulse Rate [   





Left Upper Lobe   





]   


 


Respiratory 23 25 H 27 H





Rate   


 


Respiratory   





Rate [Bilateral   





Throughout]   


 


Respiratory   





Rate [Left   





Upper Lobe]   


 


Blood Pressure 98/47 98/47 102/36


 


O2 Sat by Pulse 95 95 95





Oximetry   














  08/23/18 08/23/18 08/23/18





  01:30 01:45 02:00


 


Temperature   


 


Pulse Rate 157 H 159 H 116 H


 


Pulse Rate [   





Apical]   


 


Pulse Rate [   





Bilateral   





Throughout]   


 


Pulse Rate [   





Left Upper Lobe   





]   


 


Respiratory 25 H 25 H 25 H





Rate   


 


Respiratory   





Rate [Bilateral   





Throughout]   


 


Respiratory   





Rate [Left   





Upper Lobe]   


 


Blood Pressure 105/48 96/43 100/49


 


O2 Sat by Pulse 94 94 94





Oximetry   














  08/23/18 08/23/18 08/23/18





  02:15 02:30 02:45


 


Temperature   


 


Pulse Rate 140 H 123 H 146 H


 


Pulse Rate [   





Apical]   


 


Pulse Rate [   





Bilateral   





Throughout]   


 


Pulse Rate [   





Left Upper Lobe   





]   


 


Respiratory 25 H 24 24





Rate   


 


Respiratory   





Rate [Bilateral   





Throughout]   


 


Respiratory   





Rate [Left   





Upper Lobe]   


 


Blood Pressure 103/43 102/43 104/42


 


O2 Sat by Pulse 94 93 93





Oximetry   














  08/23/18 08/23/18 08/23/18





  03:00 03:15 03:30


 


Temperature   


 


Pulse Rate 125 H 111 H 125 H


 


Pulse Rate [   





Apical]   


 


Pulse Rate [   





Bilateral   





Throughout]   


 


Pulse Rate [   





Left Upper Lobe   





]   


 


Respiratory 25 H 25 H 23





Rate   


 


Respiratory   





Rate [Bilateral   





Throughout]   


 


Respiratory   





Rate [Left   





Upper Lobe]   


 


Blood Pressure 95/47 96/45 101/44


 


O2 Sat by Pulse 94 94 94





Oximetry   














  08/23/18 08/23/18 08/23/18





  03:45 04:00 04:15


 


Temperature   


 


Pulse Rate 116 H 130 H 113 H


 


Pulse Rate [  126 H 





Apical]   


 


Pulse Rate [   





Bilateral   





Throughout]   


 


Pulse Rate [   





Left Upper Lobe   





]   


 


Respiratory 24 24 23





Rate   


 


Respiratory   





Rate [Bilateral   





Throughout]   


 


Respiratory   





Rate [Left   





Upper Lobe]   


 


Blood Pressure 100/42 97/45 95/46


 


O2 Sat by Pulse 94 95 94





Oximetry   














  08/23/18 08/23/18 08/23/18





  04:30 04:45 05:00


 


Temperature   


 


Pulse Rate 117 H 114 H 130 H


 


Pulse Rate [   





Apical]   


 


Pulse Rate [   





Bilateral   





Throughout]   


 


Pulse Rate [   





Left Upper Lobe   





]   


 


Respiratory 25 H 23 22





Rate   


 


Respiratory   





Rate [Bilateral   





Throughout]   


 


Respiratory   





Rate [Left   





Upper Lobe]   


 


Blood Pressure 99/46 108/45 108/48


 


O2 Sat by Pulse 94 95 95





Oximetry   














  08/23/18 08/23/18 08/23/18





  05:15 05:30 05:45


 


Temperature   


 


Pulse Rate 119 H 121 H 126 H


 


Pulse Rate [   





Apical]   


 


Pulse Rate [   





Bilateral   





Throughout]   


 


Pulse Rate [   





Left Upper Lobe   





]   


 


Respiratory 23 24 23





Rate   


 


Respiratory   





Rate [Bilateral   





Throughout]   


 


Respiratory   





Rate [Left   





Upper Lobe]   


 


Blood Pressure 101/45 97/42 101/44


 


O2 Sat by Pulse 94 95 95





Oximetry   














  08/23/18 08/23/18 08/23/18





  05:51 06:00 06:15


 


Temperature   


 


Pulse Rate 124 H 116 H 126 H


 


Pulse Rate [   





Apical]   


 


Pulse Rate [   





Bilateral   





Throughout]   


 


Pulse Rate [   





Left Upper Lobe   





]   


 


Respiratory  24 24





Rate   


 


Respiratory   





Rate [Bilateral   





Throughout]   


 


Respiratory   





Rate [Left   





Upper Lobe]   


 


Blood Pressure 101/44 91/43 101/43


 


O2 Sat by Pulse 96 94 94





Oximetry   














  08/23/18 08/23/18 08/23/18





  08:00 09:00 09:36


 


Temperature 97.3 F L  


 


Pulse Rate  135 H 


 


Pulse Rate [   





Apical]   


 


Pulse Rate [   135 H





Bilateral   





Throughout]   


 


Pulse Rate [   135 H





Left Upper Lobe   





]   


 


Respiratory   





Rate   


 


Respiratory   26 H





Rate [Bilateral   





Throughout]   


 


Respiratory   26 H





Rate [Left   





Upper Lobe]   


 


Blood Pressure  115/50 


 


O2 Sat by Pulse   





Oximetry   











Constitutional: no acute distress, alert


Eyes: non-icteric


ENT: other (orally intubated and not on sedation)


Neck: supple, no JVD


Ascultation: Left: rhonchi, Bilateral: clear, diminished breath sounds


Percussion: Bilateral: not dull


Cardiovascular: irregular rhythm


Gastrointestinal: soft, tender (exquisite , no rebound), non-distended


Extremities: no cyanosis, no edema, pulses normal (diminished in lower extremity

), cool


Neurologic: pupils equal and round, CN II-XII normal, motor strength normal and

, other (appears mildly confused, but follows commands)


CBC and BMP: 


 08/23/18 05:40





 08/23/18 05:40


ABG, PT/INR, D-dimer: 


ABG











POC ABG pH  7.201  (7.35-7.45)  L  08/23/18  06:33    


 


POC ABG pCO2  31.4  (35-45)  L  08/23/18  06:33    


 


POC ABG pO2  85  ()   08/23/18  06:33    


 


POC ABG HCO3  12.3   08/23/18  06:33    


 


POC ABG Total CO2  13   08/23/18  06:33    


 


POC ABG O2 Sat  94   08/23/18  06:33    








Abnormal lab findings: 


 Abnormal Labs











  08/15/18 08/15/18 08/15/18





  14:01 15:05 15:05


 


WBC   18.7 H 


 


RBC   3.52 L 


 


Hgb   


 


Hct   


 


MCHC   


 


RDW   15.6 H 


 


Lymph % (Auto)   


 


Mono #   


 


Seg Neutrophils %   


 


Seg Neuts % (Manual)   87.0 H 


 


Lymphocytes % (Manual)   9.0 L 


 


Seg Neutrophils #   


 


Seg Neutrophils # Man   16.3 H 


 


Lymphocytes # (Manual)   


 


Monocytes # (Manual)   


 


POC ABG pH   


 


POC ABG pCO2   


 


POC ABG pO2   


 


Sodium    128 L


 


Potassium   


 


Chloride    89.6 L


 


Carbon Dioxide    20 L


 


BUN    54 H


 


Creatinine    4.9 H


 


Glucose    121 H


 


POC Glucose  153 H  


 


Calcium   


 


Total Creatine Kinase    387 H


 


Troponin T    0.053 H


 


Total Protein   


 


Albumin    2.8 L


 


LDL Cholesterol Direct    27 L


 


HDL Cholesterol    32 L


 


TSH   


 


Urine Creatinine   














  08/15/18 08/15/18 08/16/18





  15:05 21:45 02:51


 


WBC   


 


RBC   


 


Hgb   


 


Hct   


 


MCHC   


 


RDW   


 


Lymph % (Auto)   


 


Mono #   


 


Seg Neutrophils %   


 


Seg Neuts % (Manual)   


 


Lymphocytes % (Manual)   


 


Seg Neutrophils #   


 


Seg Neutrophils # Man   


 


Lymphocytes # (Manual)   


 


Monocytes # (Manual)   


 


POC ABG pH   


 


POC ABG pCO2   


 


POC ABG pO2   


 


Sodium    129 L


 


Potassium   


 


Chloride    95.0 L


 


Carbon Dioxide    17 L


 


BUN    57 H


 


Creatinine    4.8 H


 


Glucose    131 H


 


POC Glucose   235 H 


 


Calcium    7.4 L


 


Total Creatine Kinase   


 


Troponin T   


 


Total Protein   


 


Albumin   


 


LDL Cholesterol Direct   


 


HDL Cholesterol   


 


TSH  7.530 H  


 


Urine Creatinine   














  08/16/18 08/16/18 08/16/18





  06:53 06:59 07:33


 


WBC   


 


RBC   


 


Hgb   


 


Hct   


 


MCHC   


 


RDW   


 


Lymph % (Auto)   


 


Mono #   


 


Seg Neutrophils %   


 


Seg Neuts % (Manual)   


 


Lymphocytes % (Manual)   


 


Seg Neutrophils #   


 


Seg Neutrophils # Man   


 


Lymphocytes # (Manual)   


 


Monocytes # (Manual)   


 


POC ABG pH   


 


POC ABG pCO2   


 


POC ABG pO2   


 


Sodium   


 


Potassium   


 


Chloride   


 


Carbon Dioxide   


 


BUN   


 


Creatinine   


 


Glucose   228 H 


 


POC Glucose  < 40 L   184 H


 


Calcium   


 


Total Creatine Kinase   


 


Troponin T   


 


Total Protein   


 


Albumin   


 


LDL Cholesterol Direct   


 


HDL Cholesterol   


 


TSH   


 


Urine Creatinine   














  08/16/18 08/16/18 08/16/18





  10:59 11:45 16:48


 


WBC   


 


RBC   


 


Hgb   


 


Hct   


 


MCHC   


 


RDW   


 


Lymph % (Auto)   


 


Mono #   


 


Seg Neutrophils %   


 


Seg Neuts % (Manual)   


 


Lymphocytes % (Manual)   


 


Seg Neutrophils #   


 


Seg Neutrophils # Man   


 


Lymphocytes # (Manual)   


 


Monocytes # (Manual)   


 


POC ABG pH   


 


POC ABG pCO2   


 


POC ABG pO2   


 


Sodium   


 


Potassium   


 


Chloride   


 


Carbon Dioxide   


 


BUN   


 


Creatinine   


 


Glucose   


 


POC Glucose  234 H   117 H


 


Calcium   


 


Total Creatine Kinase   


 


Troponin T   


 


Total Protein   


 


Albumin   


 


LDL Cholesterol Direct   


 


HDL Cholesterol   


 


TSH   


 


Urine Creatinine   47.8 H 














  08/17/18 08/17/18 08/17/18





  06:17 06:17 07:40


 


WBC  12.8 H  


 


RBC  3.01 L  


 


Hgb  8.8 L  


 


Hct  27.4 L  


 


MCHC   


 


RDW  16.4 H  


 


Lymph % (Auto)   


 


Mono #   


 


Seg Neutrophils %   


 


Seg Neuts % (Manual)  86.0 H  


 


Lymphocytes % (Manual)  7.0 L  


 


Seg Neutrophils #   


 


Seg Neutrophils # Man  11.0 H  


 


Lymphocytes # (Manual)  0.9 L  


 


Monocytes # (Manual)  0.9 H  


 


POC ABG pH   


 


POC ABG pCO2   


 


POC ABG pO2   


 


Sodium   132 L 


 


Potassium   


 


Chloride   94.7 L 


 


Carbon Dioxide   12 L 


 


BUN   63 H 


 


Creatinine   5.6 H 


 


Glucose   225 H 


 


POC Glucose    241 H


 


Calcium   7.1 L 


 


Total Creatine Kinase   519 H 


 


Troponin T   


 


Total Protein   4.8 L D 


 


Albumin   2.0 L 


 


LDL Cholesterol Direct   


 


HDL Cholesterol   


 


TSH   


 


Urine Creatinine   














  08/17/18 08/17/18 08/18/18





  12:12 17:15 05:21


 


WBC   


 


RBC   


 


Hgb   


 


Hct   


 


MCHC   


 


RDW   


 


Lymph % (Auto)   


 


Mono #   


 


Seg Neutrophils %   


 


Seg Neuts % (Manual)   


 


Lymphocytes % (Manual)   


 


Seg Neutrophils #   


 


Seg Neutrophils # Man   


 


Lymphocytes # (Manual)   


 


Monocytes # (Manual)   


 


POC ABG pH   


 


POC ABG pCO2   


 


POC ABG pO2   


 


Sodium   


 


Potassium   


 


Chloride   


 


Carbon Dioxide   


 


BUN   


 


Creatinine   


 


Glucose   


 


POC Glucose  237 H  165 H  318 H


 


Calcium   


 


Total Creatine Kinase   


 


Troponin T   


 


Total Protein   


 


Albumin   


 


LDL Cholesterol Direct   


 


HDL Cholesterol   


 


TSH   


 


Urine Creatinine   














  08/18/18 08/18/18 08/18/18





  06:28 07:17 08:34


 


WBC   


 


RBC   


 


Hgb   


 


Hct   


 


MCHC   


 


RDW   


 


Lymph % (Auto)   


 


Mono #   


 


Seg Neutrophils %   


 


Seg Neuts % (Manual)   


 


Lymphocytes % (Manual)   


 


Seg Neutrophils #   


 


Seg Neutrophils # Man   


 


Lymphocytes # (Manual)   


 


Monocytes # (Manual)   


 


POC ABG pH    7.037 L


 


POC ABG pCO2    31.9 L


 


POC ABG pO2    303 H


 


Sodium  136 L  


 


Potassium  6.2 H* D  


 


Chloride   


 


Carbon Dioxide  7 L*  


 


BUN  72 H  


 


Creatinine  6.3 H  


 


Glucose  253 H  


 


POC Glucose   292 H 


 


Calcium  7.1 L  


 


Total Creatine Kinase   


 


Troponin T   


 


Total Protein   


 


Albumin   


 


LDL Cholesterol Direct   


 


HDL Cholesterol   


 


TSH   


 


Urine Creatinine   














  08/18/18 08/18/18 08/18/18





  09:37 11:57 12:33


 


WBC   


 


RBC   


 


Hgb   


 


Hct   


 


MCHC   


 


RDW   


 


Lymph % (Auto)   


 


Mono #   


 


Seg Neutrophils %   


 


Seg Neuts % (Manual)   


 


Lymphocytes % (Manual)   


 


Seg Neutrophils #   


 


Seg Neutrophils # Man   


 


Lymphocytes # (Manual)   


 


Monocytes # (Manual)   


 


POC ABG pH   


 


POC ABG pCO2   


 


POC ABG pO2   


 


Sodium   


 


Potassium   


 


Chloride   


 


Carbon Dioxide   


 


BUN   


 


Creatinine   


 


Glucose   


 


POC Glucose  414 H  379 H 


 


Calcium   


 


Total Creatine Kinase   


 


Troponin T    0.206 H* D


 


Total Protein   


 


Albumin   


 


LDL Cholesterol Direct   


 


HDL Cholesterol   


 


TSH   


 


Urine Creatinine   














  08/18/18 08/18/18 08/18/18





  12:33 14:23 17:34


 


WBC   


 


RBC   


 


Hgb   


 


Hct   


 


MCHC   


 


RDW   


 


Lymph % (Auto)   


 


Mono #   


 


Seg Neutrophils %   


 


Seg Neuts % (Manual)   


 


Lymphocytes % (Manual)   


 


Seg Neutrophils #   


 


Seg Neutrophils # Man   


 


Lymphocytes # (Manual)   


 


Monocytes # (Manual)   


 


POC ABG pH   


 


POC ABG pCO2   


 


POC ABG pO2   


 


Sodium  132 L   134 L


 


Potassium  5.9 H  


 


Chloride   


 


Carbon Dioxide  11 L   14 L


 


BUN  73 H   71 H


 


Creatinine  6.1 H   6.0 H


 


Glucose  336 H   225 H


 


POC Glucose   306 H 


 


Calcium  7.0 L   6.8 L


 


Total Creatine Kinase   


 


Troponin T   


 


Total Protein   


 


Albumin   


 


LDL Cholesterol Direct   


 


HDL Cholesterol   


 


TSH   


 


Urine Creatinine   














  08/19/18 08/19/18 08/19/18





  04:00 04:30 04:30


 


WBC    19.3 H


 


RBC    3.05 L


 


Hgb    8.8 L


 


Hct    27.1 L


 


MCHC   


 


RDW    16.6 H


 


Lymph % (Auto)    7.9 L


 


Mono #    1.2 H


 


Seg Neutrophils %    85.2 H


 


Seg Neuts % (Manual)   


 


Lymphocytes % (Manual)   


 


Seg Neutrophils #    16.4 H


 


Seg Neutrophils # Man   


 


Lymphocytes # (Manual)   


 


Monocytes # (Manual)   


 


POC ABG pH  7.274 L  


 


POC ABG pCO2  33.6 L  


 


POC ABG pO2  129 H  


 


Sodium   


 


Potassium   


 


Chloride   


 


Carbon Dioxide   15 L 


 


BUN   71 H 


 


Creatinine   5.8 H 


 


Glucose   135 H 


 


POC Glucose   


 


Calcium   6.6 L 


 


Total Creatine Kinase   


 


Troponin T   


 


Total Protein   


 


Albumin   


 


LDL Cholesterol Direct   


 


HDL Cholesterol   


 


TSH   


 


Urine Creatinine   














  08/19/18 08/19/18 08/19/18





  07:52 16:31 21:51


 


WBC   


 


RBC   


 


Hgb   


 


Hct   


 


MCHC   


 


RDW   


 


Lymph % (Auto)   


 


Mono #   


 


Seg Neutrophils %   


 


Seg Neuts % (Manual)   


 


Lymphocytes % (Manual)   


 


Seg Neutrophils #   


 


Seg Neutrophils # Man   


 


Lymphocytes # (Manual)   


 


Monocytes # (Manual)   


 


POC ABG pH   


 


POC ABG pCO2   


 


POC ABG pO2   


 


Sodium   


 


Potassium   


 


Chloride   


 


Carbon Dioxide   


 


BUN   


 


Creatinine   


 


Glucose   


 


POC Glucose  191 H  239 H  241 H


 


Calcium   


 


Total Creatine Kinase   


 


Troponin T   


 


Total Protein   


 


Albumin   


 


LDL Cholesterol Direct   


 


HDL Cholesterol   


 


TSH   


 


Urine Creatinine   














  08/20/18 08/20/18 08/20/18





  05:27 06:55 12:25


 


WBC   


 


RBC   


 


Hgb   


 


Hct   


 


MCHC   


 


RDW   


 


Lymph % (Auto)   


 


Mono #   


 


Seg Neutrophils %   


 


Seg Neuts % (Manual)   


 


Lymphocytes % (Manual)   


 


Seg Neutrophils #   


 


Seg Neutrophils # Man   


 


Lymphocytes # (Manual)   


 


Monocytes # (Manual)   


 


POC ABG pH  7.303 L  


 


POC ABG pCO2  30.3 L  


 


POC ABG pO2  72 L  


 


Sodium   


 


Potassium   


 


Chloride   112.0 H 


 


Carbon Dioxide   14 L 


 


BUN   72 H 


 


Creatinine   5.6 H 


 


Glucose   230 H 


 


POC Glucose    279 H


 


Calcium   6.1 L 


 


Total Creatine Kinase   


 


Troponin T   


 


Total Protein   


 


Albumin   


 


LDL Cholesterol Direct   


 


HDL Cholesterol   


 


TSH   


 


Urine Creatinine   














  08/21/18 08/21/18 08/21/18





  04:01 04:01 05:47


 


WBC   19.9 H 


 


RBC   3.14 L 


 


Hgb   9.2 L 


 


Hct   27.5 L 


 


MCHC   


 


RDW   17.0 H 


 


Lymph % (Auto)   


 


Mono #   


 


Seg Neutrophils %   


 


Seg Neuts % (Manual)   73.0 H 


 


Lymphocytes % (Manual)   5.0 L 


 


Seg Neutrophils #   


 


Seg Neutrophils # Man   14.5 H 


 


Lymphocytes # (Manual)   1.0 L 


 


Monocytes # (Manual)   


 


POC ABG pH    7.320 L


 


POC ABG pCO2    31.4 L


 


POC ABG pO2   


 


Sodium  146 H  


 


Potassium   


 


Chloride  111.2 H  


 


Carbon Dioxide  16 L  


 


BUN  79 H  


 


Creatinine  5.7 H  


 


Glucose  253 H  


 


POC Glucose   


 


Calcium  6.3 L  


 


Total Creatine Kinase   


 


Troponin T   


 


Total Protein   


 


Albumin   


 


LDL Cholesterol Direct   


 


HDL Cholesterol   


 


TSH   


 


Urine Creatinine   














  08/21/18 08/22/18 08/22/18





  13:35 04:39 18:15


 


WBC  20.7 H   40.2 H*


 


RBC  3.14 L   2.96 L


 


Hgb  9.4 L   8.5 L


 


Hct  27.1 L   26.0 L


 


MCHC  35 H  


 


RDW  17.1 H   17.3 H


 


Lymph % (Auto)   


 


Mono #   


 


Seg Neutrophils %   


 


Seg Neuts % (Manual)    90.5 H


 


Lymphocytes % (Manual)    4.0 L


 


Seg Neutrophils #   


 


Seg Neutrophils # Man    36.4 H


 


Lymphocytes # (Manual)   


 


Monocytes # (Manual)    1.4 H


 


POC ABG pH   


 


POC ABG pCO2   


 


POC ABG pO2   


 


Sodium   


 


Potassium   2.9 L* 


 


Chloride   


 


Carbon Dioxide   15 L 


 


BUN   87 H 


 


Creatinine   5.9 H 


 


Glucose   269 H 


 


POC Glucose   


 


Calcium   6.4 L 


 


Total Creatine Kinase   


 


Troponin T   


 


Total Protein   


 


Albumin   


 


LDL Cholesterol Direct   


 


HDL Cholesterol   


 


TSH   


 


Urine Creatinine   














  08/23/18 08/23/18 08/23/18





  05:40 05:40 06:22


 


WBC   41.3 H* 


 


RBC   2.84 L 


 


Hgb   8.0 L 


 


Hct   24.9 L 


 


MCHC   


 


RDW   17.7 H 


 


Lymph % (Auto)   


 


Mono #   


 


Seg Neutrophils %   


 


Seg Neuts % (Manual)   


 


Lymphocytes % (Manual)   


 


Seg Neutrophils #   


 


Seg Neutrophils # Man   


 


Lymphocytes # (Manual)   


 


Monocytes # (Manual)   


 


POC ABG pH    7.154 L


 


POC ABG pCO2   


 


POC ABG pO2    37 L


 


Sodium   


 


Potassium   


 


Chloride  110.6 H  


 


Carbon Dioxide  13 L  


 


BUN  93 H  


 


Creatinine  5.8 H  


 


Glucose  199 H  


 


POC Glucose   


 


Calcium  6.0 L  


 


Total Creatine Kinase   


 


Troponin T   


 


Total Protein   


 


Albumin   


 


LDL Cholesterol Direct   


 


HDL Cholesterol   


 


TSH   


 


Urine Creatinine   














  08/23/18





  06:33


 


WBC 


 


RBC 


 


Hgb 


 


Hct 


 


MCHC 


 


RDW 


 


Lymph % (Auto) 


 


Mono # 


 


Seg Neutrophils % 


 


Seg Neuts % (Manual) 


 


Lymphocytes % (Manual) 


 


Seg Neutrophils # 


 


Seg Neutrophils # Man 


 


Lymphocytes # (Manual) 


 


Monocytes # (Manual) 


 


POC ABG pH  7.201 L


 


POC ABG pCO2  31.4 L


 


POC ABG pO2 


 


Sodium 


 


Potassium 


 


Chloride 


 


Carbon Dioxide 


 


BUN 


 


Creatinine 


 


Glucose 


 


POC Glucose 


 


Calcium 


 


Total Creatine Kinase 


 


Troponin T 


 


Total Protein 


 


Albumin 


 


LDL Cholesterol Direct 


 


HDL Cholesterol 


 


TSH 


 


Urine Creatinine

## 2018-08-23 NOTE — DEATH SUMMARY
Death Summary





- Providers


Date of service: 08/23/18


Consults: 


 





08/15/18 15:39


Consult to Physician [CONS] Urgent 


   Comment: DR SAENZ NOTIFIED PER DR HAWK


   Consulting Provider: CLYDE SAENZ


   Physician Instructions: 


   Reason For Exam: deandre





08/16/18 09:22


Consult to Physician [CONS] Routine 


   Comment: 


   Consulting Provider: ABEBA GUZMAN


   Physician Instructions: 


   Reason For Exam: abd pain/sigmoid colon thickening on CT





08/18/18 07:20


Consult to Physician [CONS] Routine 


   Comment: 


   Consulting Provider: SONIDO HUSTON


   Physician Instructions: 


   Reason For Exam: s/p cardiac arrest, intubated





08/19/18 12:31


Consult to Dietitian/Nutrition [CONS] Routine 


   Physician Instructions: Assess nutrtn needs, initiate, modify, manage TF


   Reason For Exam: 


   Reason for Consult: Write/Manage Tube Feeding


   Reason for Consult: Write/Manage Tube Feeding





08/23/18 10:25


Consult to Physician [CONS] Routine 


   Comment: 


   Consulting Provider: LAUREANO LEON


   Physician Instructions: 


   Reason For Exam: sepsis











Attending: 


ART SOLIS








- Death summary


Date of admission: 


08/15/18 16:29

## 2018-08-23 NOTE — PROGRESS NOTE
Assessment and Plan





1. Acute kidney injury:


FELICIA superimposed on CKD in the setting of volume depletion, hypotension and 

Cardiac arrest.


No improvement in the renal function.


Renal prognosis is guarded.


No dialysis per family.


Continue IV fluids.





2. Electrolytes:


Hyperkalemia, improved.


Anion gap metabolic acidosis, monitor.


Replete Ca.





3. S/p PEA.





4. Hypotension:


On Levophed.





5. Respiratory failure:


On vent.





6. Sepsis.





7. A.fib with RVR.





8. Anemia.





Subjective


Date of service: 08/23/18


Principal diagnosis: abd pain, sigmoid colon thickening


Interval history: 


Patient was seen and examined at the bedside.





Objective





- Vital Signs


Vital signs: 


 Vital Signs - 12hr











  08/22/18 08/22/18 08/22/18





  21:15 21:30 21:45


 


Temperature   


 


Pulse Rate 118 H 127 H 136 H


 


Pulse Rate [   





Apical]   


 


Respiratory 23 22 24





Rate   


 


Blood Pressure 95/48 100/46 107/47


 


O2 Sat by Pulse 94 94 94





Oximetry   














  08/22/18 08/22/18 08/22/18





  22:00 22:15 22:30


 


Temperature   


 


Pulse Rate 129 H 120 H 173 H


 


Pulse Rate [   





Apical]   


 


Respiratory 23 27 H 24





Rate   


 


Blood Pressure 105/46 82/36 90/40


 


O2 Sat by Pulse 94 94 93





Oximetry   














  08/22/18 08/22/18 08/22/18





  22:45 23:00 23:15


 


Temperature   


 


Pulse Rate 135 H 154 H 122 H


 


Pulse Rate [   





Apical]   


 


Respiratory 24 23 26 H





Rate   


 


Blood Pressure 90/40 106/50 91/49


 


O2 Sat by Pulse 93 94 94





Oximetry   














  08/22/18 08/22/18 08/23/18





  23:30 23:45 00:00


 


Temperature   99.3 F


 


Pulse Rate 130 H 132 H 135 H


 


Pulse Rate [   118 H





Apical]   


 


Respiratory 25 H 25 H 24





Rate   


 


Blood Pressure 98/44 100/52 102/51


 


O2 Sat by Pulse 93 94 95





Oximetry   














  08/23/18 08/23/18 08/23/18





  00:03 00:15 00:24


 


Temperature 98.7 F  


 


Pulse Rate  145 H 123 H


 


Pulse Rate [   





Apical]   


 


Respiratory  24 





Rate   


 


Blood Pressure  107/44 107/44


 


O2 Sat by Pulse  94 94





Oximetry   














  08/23/18 08/23/18 08/23/18





  00:30 00:45 01:00


 


Temperature   


 


Pulse Rate 144 H 123 H 125 H


 


Pulse Rate [   





Apical]   


 


Respiratory 26 H 23 25 H





Rate   


 


Blood Pressure 99/51 98/47 98/47


 


O2 Sat by Pulse 94 95 95





Oximetry   














  08/23/18 08/23/18 08/23/18





  01:15 01:30 01:45


 


Temperature   


 


Pulse Rate 118 H 157 H 159 H


 


Pulse Rate [   





Apical]   


 


Respiratory 27 H 25 H 25 H





Rate   


 


Blood Pressure 102/36 105/48 96/43


 


O2 Sat by Pulse 95 94 94





Oximetry   














  08/23/18 08/23/18 08/23/18





  02:00 02:15 02:30


 


Temperature   


 


Pulse Rate 116 H 140 H 123 H


 


Pulse Rate [   





Apical]   


 


Respiratory 25 H 25 H 24





Rate   


 


Blood Pressure 100/49 103/43 102/43


 


O2 Sat by Pulse 94 94 93





Oximetry   














  08/23/18 08/23/18 08/23/18





  02:45 03:00 03:15


 


Temperature   


 


Pulse Rate 146 H 125 H 111 H


 


Pulse Rate [   





Apical]   


 


Respiratory 24 25 H 25 H





Rate   


 


Blood Pressure 104/42 95/47 96/45


 


O2 Sat by Pulse 93 94 94





Oximetry   














  08/23/18 08/23/18 08/23/18





  03:30 03:45 04:00


 


Temperature   


 


Pulse Rate 125 H 116 H 130 H


 


Pulse Rate [   126 H





Apical]   


 


Respiratory 23 24 24





Rate   


 


Blood Pressure 101/44 100/42 97/45


 


O2 Sat by Pulse 94 94 95





Oximetry   














  08/23/18 08/23/18 08/23/18





  04:15 04:30 04:45


 


Temperature   


 


Pulse Rate 113 H 117 H 114 H


 


Pulse Rate [   





Apical]   


 


Respiratory 23 25 H 23





Rate   


 


Blood Pressure 95/46 99/46 108/45


 


O2 Sat by Pulse 94 94 95





Oximetry   














  08/23/18 08/23/18 08/23/18





  05:00 05:15 05:30


 


Temperature   


 


Pulse Rate 130 H 119 H 121 H


 


Pulse Rate [   





Apical]   


 


Respiratory 22 23 24





Rate   


 


Blood Pressure 108/48 101/45 97/42


 


O2 Sat by Pulse 95 94 95





Oximetry   














  08/23/18 08/23/18 08/23/18





  05:45 05:51 06:00


 


Temperature   


 


Pulse Rate 126 H 124 H 116 H


 


Pulse Rate [   





Apical]   


 


Respiratory 23  24





Rate   


 


Blood Pressure 101/44 101/44 91/43


 


O2 Sat by Pulse 95 96 94





Oximetry   














  08/23/18 08/23/18





  06:15 08:00


 


Temperature  97.3 F L


 


Pulse Rate 126 H 


 


Pulse Rate [  





Apical]  


 


Respiratory 24 





Rate  


 


Blood Pressure 101/43 


 


O2 Sat by Pulse 94 





Oximetry  














- General Appearance


General appearance: well-developed, appears stated age, intubated, other (on 

vent)


EENT: ATNC, sclerotic cornea (left eye)


Neck: supple


Respiratory: Present: Other (coarse breath sounds)


Cardiology: irregularly irregular, tachycardia, S1S2


Gastrointestinal: normoactive bowel sounds, no tenderness


Integumentary: no rash


Neurologic: other (opens eyes)


Musculoskeletal: other (trace LE edema noted)





- Lab





 08/23/18 05:40





 08/23/18 05:40


 Most recent lab results











Calcium  6.0 mg/dL (8.4-10.2)  L  08/23/18  05:40    


 


Magnesium  2.20 mg/dL (1.7-2.3)   08/17/18  06:17    


 


Urine Creatinine  47.8 mg/dL (0.1-20.0)  H  08/16/18  11:45    


 


Urine Sodium  27 mmol/L  08/16/18  11:45

## 2018-08-23 NOTE — EVENT NOTE
Date: 08/23/18





Patient pronounced death at 11:39 pm


She noted be on asystole on monitor. 


Family called and notified the daughter ([id number])